# Patient Record
Sex: FEMALE | Race: WHITE | NOT HISPANIC OR LATINO | Employment: OTHER | URBAN - METROPOLITAN AREA
[De-identification: names, ages, dates, MRNs, and addresses within clinical notes are randomized per-mention and may not be internally consistent; named-entity substitution may affect disease eponyms.]

---

## 2017-05-08 ENCOUNTER — THERAPY VISIT (OUTPATIENT)
Dept: OCCUPATIONAL THERAPY | Facility: CLINIC | Age: 77
End: 2017-05-08
Payer: MEDICARE

## 2017-05-08 DIAGNOSIS — M79.645 PAIN OF LEFT THUMB: Primary | ICD-10-CM

## 2017-05-08 DIAGNOSIS — Z47.89 AFTERCARE FOLLOWING SURGERY OF THE MUSCULOSKELETAL SYSTEM: ICD-10-CM

## 2017-05-08 PROCEDURE — G8985 CARRY GOAL STATUS: HCPCS | Mod: GO | Performed by: OCCUPATIONAL THERAPIST

## 2017-05-08 PROCEDURE — 97165 OT EVAL LOW COMPLEX 30 MIN: CPT | Mod: GO | Performed by: OCCUPATIONAL THERAPIST

## 2017-05-08 PROCEDURE — 97110 THERAPEUTIC EXERCISES: CPT | Mod: GO | Performed by: OCCUPATIONAL THERAPIST

## 2017-05-08 PROCEDURE — G8984 CARRY CURRENT STATUS: HCPCS | Mod: GO | Performed by: OCCUPATIONAL THERAPIST

## 2017-05-08 NOTE — PROGRESS NOTES
Hand Therapy Initial Evaluation  Current Date:  5/8/2017    Subjective:  Meredith Allen is a 76 year old Right hand dominant female.    Diagnosis:L CMC Arthritis  DOI:  5/4/17 (MD Order)  DOS:  3/2/17  Procedure: L CMC Arthroplasty     Patient reports symptoms of pain, stiffness/loss of motion, weakness/loss of strength and edema of the L thumb which occurred due to surgery. Since onset symptoms are Gradually getting better. Special tests:  x-ray.  Previous treatment: none.  Patient has custom forearm based thumb spica but states that it is too small for her due to her swelling. General health as reported by patient is good.  Pertinent medical history includes: none.  Medical allergies: none.  Surgical history: orthopedic: 3 feet and 1 hand surgeries.  Medication history: none.  Occupation: retired,   Barriers include:none  Prior functional level:  no limitations  Red flags:  none    Additional Occupational Profile Information (patterns of daily living, interests, values and needs): Patient is retired and enjoys playing the organ but is currently frustrated with being unable to play.  Patient also quilts.        Upper Extremity Functional Index Score:  SCORE:   Column Totals: /80: 49   (A lower score indicates greater disability.)    Objective:  Pain Level Report: On scale 0-10/10  Date 5/8/2017    side L    Overall 3-4    At Rest 3-4    With Activity 7-8      Primary Report: location and description  Date 5/8/2017    Side L    Location Base of thumb and wrist    Radiation none    Pain Quality Dull and shooting     Frequency Worse at night and during the day    Duration Constant    Exacerbated by General use    Relieved by Ice    Progression since onset Gradually getting better       Edema: Mild edema in L wrist    Scar: Thumb scar: skin color, mild adherence, no sensitivity   Forearm scar: skin color, mild adherence, no sensitivity    Sensation: Normal sensation within thumb and wrist per pt  report    Thumb Range of Motion AROM (PROM):  Date 5/8/2017 5/8/2017   Side: R L   MCP ext 0 0   MCP flex 53 15   IP ext + +   IP flex 35 20   RABD 60 60   PABD 60 50     Range of Motion Wrist AROM (PROM):  Date 5/8/2017 5/8/2017   Side R L   Ext 70 60   Flex 65 35   UD 20 20   RD 10 10   Sup 90 90   Pro 70 75     STRENGTH: (Measured in pounds, pain scale 0-10/10)    Date 5/8/2017        Trials Left Right Left Right Left Right Left Right Left Right Left Right   1 15 40             2               3               Avg               Pain 2-3                Assessment:  Patient presents with symptoms consistent with diagnosis of right CMC thumb arthritis, with surgical intervention.    Patient s limitations or Problem List includes: Pain, Decreased ROM/motion, weakness, decreased  and pinch strength of the thumb which interferes with patients ability to perform  Self Care Tasks (dressing, eating), Sleep Patterns, Recreational Activities, Household Chores and Driving   as compared to previous level of function.    Rehab Potential: Good- Return to full activity, some limitations.    Patient will benefit from skilled Occupational Therapy to increase ROM, flexibility, pinch strength, and stability of the thumb and decrease pain to return to previous activity level and resume normal daily tasks and to reach their rehab potential.    Barriers to Learning:  No barrier    Communication Issues: Patient appears to be able to clearly communicate and understand verbal and written communication and follow directions correctly.    Assessment of Occupational Performance:  5 or more Performance Deficits  Identified Performance Deficits: dressing, functional mobility, driving and community mobility, health management and maintenance, home establishment and management, meal preparation and cleanup, sleep and leisure activities      Clinical Decision Making (Complexity): Low complexity    Treatment Explanations:  The following has  been discussed with the patient,  Rx ordered/plan of care  Anticipated outcomes  Possible risks and side effects    Treatment Plan:  Frequency:  1 X week, once daily  Duration:  for 6 weeks    Clinic:  Therapeutic Exercise: AROM/PROM of the wrist fingers, and thumb, thumb stabilization program, progressing to isometrics and isotonics  Manual Techniques: scar mobilization, MFR, nerve gliding  Orthosis:  Forearm based Thumb Orthosis  Education: Anatomy of CMC, joint protection principles, adaptive equipment as needed.    Home Program:  Exercise: AROM of thumb and wrist  Orthosis: Forearm based Thumb Spica PRN  Manual Techniques: scar mobilization    Discharge Plan:  Achieve all LTG  [place] in home treatment program.  Reach maximal therapeutic benefit.    Next Visit:  MFR  Distal Radial nerve glide  PROM  Scar massage

## 2017-05-08 NOTE — MR AVS SNAPSHOT
"              After Visit Summary   5/8/2017    Meredith Allen    MRN: [id number]           Patient Information     Date Of Birth          1940        Visit Information        Provider Department      5/8/2017 10:00 AM Shelley Smith MARITA        Today's Diagnoses     Pain of left thumb    -  1    Aftercare following surgery of the musculoskeletal system           Follow-ups after your visit        Your next 10 appointments already scheduled     May 17, 2017  4:30 PM CDT   [facility] with Shelley Smith   [facility] ([facility]  )    [address]  [address]  [facility] [address]   [phone]            May 24, 2017  1:30 PM CDT   [facility] with Shelley Smith   [facility] ([facility]  )    [address]  [facility] [address]   [phone]              Who to contact     If you have questions or need follow up information about today's clinic visit or your schedule please contact [facility] directly at [phone].  Normal or non-critical lab and imaging results will be communicated to you by [email or web address], letter or phone within 4 business days after the clinic has received the results. If you do not hear from us within 7 days, please contact the clinic through LockerDomet or phone. If you have a critical or abnormal lab result, we will notify you by phone as soon as possible.  Submit refill requests through Huoshi or call your pharmacy and they will forward the refill request to us. Please allow 3 business days for your refill to be completed.          Additional Information About Your Visit        [email or web address] Information     Huoshi lets you send messages to your doctor, view your test results, renew your prescriptions, schedule appointments and more. To sign up, go to [email or web address] . Click on \"Log in\" on the left side of the screen, which will take you to the Welcome page. Then click on \"Sign up Now\" on the right side of the " page.     You will be asked to enter the access code listed below, as well as some personal information. Please follow the directions to create your username and password.     Your access code is: [email or web address]  Expires: 2017  3:00 PM     Your access code will  in 90 days. If you need help or a new code, please call your [facility] clinic or [phone].        Care EveryWhere ID     This is your Care EveryWhere ID. This could be used by other organizations to access your [facility] medical records  [id number]         Blood Pressure from Last 3 Encounters:   12 140/65   02/15/12 99/59    Weight from Last 3 Encounters:   12 81 kg (178 lb 9.2 oz)   02/15/12 81.6 kg (180 lb)              We Performed the Following     HC OT EVAL, LOW COMPLEXITY     [facility] CERT REPORT     [facility] INITIAL EVAL REPORT        Primary Care Provider Office Phone # Fax #    Donn Alonso [phone]       [address] [address]        Thank you!     Thank you for choosing [facility]  for your care. Our goal is always to provide you with excellent care. Hearing back from our patients is one way we can continue to improve our services. Please take a few minutes to complete the written survey that you may receive in the mail after your visit with us. Thank you!             Your Updated Medication List - Protect others around you: Learn how to safely use, store and throw away your medicines at www.disposemymeds.org.          This list is accurate as of: 17  3:00 PM.  Always use your most recent med list.                   Brand Name Dispense Instructions for use    aspirin 81 MG EC tablet     300 tablet    Take 1 tablet by mouth daily.       calcium-magnesium 500-250 MG Tabs per tablet    CALMAG     Take 1 tablet by mouth daily.       cholecalciferol 1000 UNIT tablet    vitamin D     Take 1,000 Units by mouth daily.       co-enzyme Q-10 100 MG Caps  capsule      Take 100 mg by mouth daily.       conjugated estrogens cream    PREMARIN     Place  vaginally twice a week. Takes as needed       FENOFIBRATE PO      Take 145 mg by mouth daily.       fish oil-omega-3 fatty acids 1000 MG capsule      Take 2 g by mouth daily.       glucosamine-chondroitin 500-400 MG Caps per capsule      Take 1 capsule by mouth daily.       METOPROLOL TARTRATE PO      Take 25 mg by mouth 2 times daily.       Multi-vitamin Tabs tablet   Generic drug:  multivitamin, therapeutic with minerals      Take 1 tablet by mouth daily.       rosuvastatin 20 MG tablet    CRESTOR    90 tablet    Take 1 tablet by mouth daily.

## 2017-05-08 NOTE — LETTER
DEPARTMENT OF HEALTH AND HUMAN SERVICES  CENTERS FOR MEDICARE & MEDICAID SERVICES    PLAN/UPDATED PLAN OF PROGRESS FOR OUTPATIENT REHABILITATION    PATIENTS NAME:  Meredith Allen   : 1940  PROVIDER NUMBER:  [id number]  [id number]   PROVIDER NAME: [facility]  MEDICAL RECORD NUMBER: [id number]   START OF CARE DATE:    SOC Date: 17   TYPE:  OT  PRIMARY/TREATMENT DIAGNOSIS: (Pertinent Medical Diagnosis)  Pain of left thumb  Aftercare following surgery of the musculoskeletal system    VISITS FROM START OF CARE:  Rxs Used: 1     Hand Therapy Initial Evaluation  Current Date:  2017    Subjective:  Meredith Allen is a 76 year old Right hand dominant female.    Diagnosis:L CMC Arthritis  DOI:  17 (MD Order)  DOS:  3/2/17  Procedure: L CMC Arthroplasty     Patient reports symptoms of pain, stiffness/loss of motion, weakness/loss of strength and edema of the L thumb which occurred due to surgery. Since onset symptoms are Gradually getting better. Special tests:  x-ray.  Previous treatment: none.  Patient has custom forearm based thumb spica but states that it is too small for her due to her swelling. General health as reported by patient is good.  Pertinent medical history includes: none.  Medical allergies: none.  Surgical history: orthopedic: 3 feet and 1 hand surgeries.  Medication history: none.  Occupation: retired,   Barriers include:none  Prior functional level:  no limitations  Red flags:  none  Additional Occupational Profile Information (patterns of daily living, interests, values and needs): Patient is retired and enjoys playing the organ but is currently frustrated with being unable to play.  Patient also quilts.      Upper Extremity Functional Index Score:  SCORE:   Column Totals: /80: 49   (A lower score indicates greater disability.)    Objective:  Pain Level Report: On scale 0-10/10  Date 2017    side L    Overall 3-4    At Rest 3-4    With Activity 7-8     PATIENTS NAME:  Meredith Allen   : 1940    Primary Report: location and description  Date 2017    Side L    Location Base of thumb and wrist    Radiation none    Pain Quality Dull and shooting     Frequency Worse at night and during the day    Duration Constant    Exacerbated by General use    Relieved by Ice    Progression since onset Gradually getting better       Edema: Mild edema in L wrist    Scar: Thumb scar: skin color, mild adherence, no sensitivity   Forearm scar: skin color, mild adherence, no sensitivity    Sensation: Normal sensation within thumb and wrist per pt report    Thumb Range of Motion AROM (PROM):  Date 2017   Side: R L   MCP ext 0 0   MCP flex 53 15   IP ext + +   IP flex 35 20   RABD 60 60   PABD 60 50     Range of Motion Wrist AROM (PROM):  Date 2017   Side R L   Ext 70 60   Flex 65 35   UD 20 20   RD 10 10   Sup 90 90   Pro 70 75                     PATIENTS NAME:  Meredith Allen   : 1940    STRENGTH: (Measured in pounds, pain scale 0-10/10)    Date 2017      Trials Left Right Left Right Left Right Left Right   1 15 40         2           3           Avg           Pain 2-3            Assessment:  Patient presents with symptoms consistent with diagnosis of right CMC thumb arthritis, with surgical intervention.    Patient s limitations or Problem List includes: Pain, Decreased ROM/motion, weakness, decreased  and pinch strength of the thumb which interferes with patients ability to perform  Self Care Tasks (dressing, eating), Sleep Patterns, Recreational Activities, Household Chores and Driving   as compared to previous level of function.    Rehab Potential: Good- Return to full activity, some limitations.    Patient will benefit from skilled Occupational Therapy to increase ROM, flexibility, pinch strength, and stability of the thumb and decrease pain to return to previous activity level and resume normal daily tasks and to reach  their rehab potential.    Barriers to Learning:  No barrier    Communication Issues: Patient appears to be able to clearly communicate and understand verbal and written communication and follow directions correctly.    Assessment of Occupational Performance:  5 or more Performance Deficits  Identified Performance Deficits: dressing, functional mobility, driving and community mobility, health management and maintenance, home establishment and management, meal preparation and cleanup, sleep and leisure activities      Clinical Decision Making (Complexity): Low complexity    Treatment Explanations:  The following has been discussed with the patient,  Rx ordered/plan of care  Anticipated outcomes  Possible risks and side effects    Treatment Plan:  Frequency:  1 X week, once daily  Duration:  for 6 weeks    Clinic:  Therapeutic Exercise: AROM/PROM of the wrist fingers, and thumb, thumb stabilization program, progressing to isometrics and isotonics  Manual Techniques: scar mobilization, MFR, nerve gliding  Orthosis:  Forearm based Thumb Orthosis  Education: Anatomy of CMC, joint protection principles, adaptive equipment as needed.  PATIENTS NAME:  Merdeith Allen   : 1940    Home Program:  Exercise: AROM of thumb and wrist  Orthosis: Forearm based Thumb Spica PRN  Manual Techniques: scar mobilization    Discharge Plan:  Achieve all LTG  [place] in home treatment program.  Reach maximal therapeutic benefit.    Next Visit:  MFR  Distal Radial nerve glide  PROM  Scar massage                Caregiver Signature/Credentials ______________________________ Date ________      Treating Provider:  Shelley Smith OTR, CHT    I have reviewed and certified the need for these services and plan of treatment while under my care.     PHYSICIAN'S SIGNATURE:   ___________________________________ Date___________      Rachel Chirinos    Certification period: Beginning of Cert date period: 17 End of Cert period date: 17  "    Functional Level Progress Report: Please see attached \"Goal Flow sheet for Functional level.\"    ___X_____ Continue Services or       ________ DC Services                Service dates: SOC Date: 05/08/17  to present                                                                     "

## 2017-05-17 ENCOUNTER — THERAPY VISIT (OUTPATIENT)
Dept: OCCUPATIONAL THERAPY | Facility: CLINIC | Age: 77
End: 2017-05-17
Payer: MEDICARE

## 2017-05-17 DIAGNOSIS — M79.645 PAIN OF LEFT THUMB: ICD-10-CM

## 2017-05-17 DIAGNOSIS — Z47.89 AFTERCARE FOLLOWING SURGERY OF THE MUSCULOSKELETAL SYSTEM: ICD-10-CM

## 2017-05-17 PROCEDURE — 97140 MANUAL THERAPY 1/> REGIONS: CPT | Mod: GO | Performed by: OCCUPATIONAL THERAPIST

## 2017-05-17 PROCEDURE — 97110 THERAPEUTIC EXERCISES: CPT | Mod: GO | Performed by: OCCUPATIONAL THERAPIST

## 2017-05-24 ENCOUNTER — THERAPY VISIT (OUTPATIENT)
Dept: OCCUPATIONAL THERAPY | Facility: CLINIC | Age: 77
End: 2017-05-24
Payer: MEDICARE

## 2017-05-24 DIAGNOSIS — Z47.89 AFTERCARE FOLLOWING SURGERY OF THE MUSCULOSKELETAL SYSTEM: ICD-10-CM

## 2017-05-24 DIAGNOSIS — M79.645 PAIN OF LEFT THUMB: ICD-10-CM

## 2017-05-24 PROCEDURE — 97110 THERAPEUTIC EXERCISES: CPT | Mod: GO | Performed by: OCCUPATIONAL THERAPIST

## 2017-05-24 PROCEDURE — 97140 MANUAL THERAPY 1/> REGIONS: CPT | Mod: GO | Performed by: OCCUPATIONAL THERAPIST

## 2017-05-31 ENCOUNTER — THERAPY VISIT (OUTPATIENT)
Dept: OCCUPATIONAL THERAPY | Facility: CLINIC | Age: 77
End: 2017-05-31
Payer: MEDICARE

## 2017-05-31 DIAGNOSIS — M79.645 PAIN OF LEFT THUMB: ICD-10-CM

## 2017-05-31 DIAGNOSIS — Z47.89 AFTERCARE FOLLOWING SURGERY OF THE MUSCULOSKELETAL SYSTEM: ICD-10-CM

## 2017-05-31 PROCEDURE — 97112 NEUROMUSCULAR REEDUCATION: CPT | Mod: GO | Performed by: OCCUPATIONAL THERAPIST

## 2017-05-31 PROCEDURE — 97140 MANUAL THERAPY 1/> REGIONS: CPT | Mod: GO | Performed by: OCCUPATIONAL THERAPIST

## 2017-05-31 NOTE — PROGRESS NOTES
SOAP Note objective information for 5/31/2017    Thumb Range of Motion AROM (PROM):  Date 5/8/2017 5/8/2017 5/31/17   Side: R L L   MCP ext 0 0 NT   MCP flex 53 15 20   IP ext + + NT   IP flex 35 20 45   RABD 60 60 NT   PABD 60 50 58     Range of Motion Wrist AROM (PROM):  Date 5/8/2017 5/8/2017 5/31/17   Side R L L   Ext 70 60 68   Flex 65 35 50   UD 20 20 NT   RD 10 10 NT   Sup 90 90 NT   Pro 70 75 NT   Please refer to the daily flowsheet for treatment today, total treatment time and time spent performing 1:1 timed codes.

## 2017-06-07 ENCOUNTER — THERAPY VISIT (OUTPATIENT)
Dept: OCCUPATIONAL THERAPY | Facility: CLINIC | Age: 77
End: 2017-06-07
Payer: MEDICARE

## 2017-06-07 DIAGNOSIS — Z47.89 AFTERCARE FOLLOWING SURGERY OF THE MUSCULOSKELETAL SYSTEM: ICD-10-CM

## 2017-06-07 DIAGNOSIS — M79.645 PAIN OF LEFT THUMB: ICD-10-CM

## 2017-06-07 PROCEDURE — 97110 THERAPEUTIC EXERCISES: CPT | Mod: GO | Performed by: OCCUPATIONAL THERAPIST

## 2017-06-07 PROCEDURE — 97140 MANUAL THERAPY 1/> REGIONS: CPT | Mod: GO | Performed by: OCCUPATIONAL THERAPIST

## 2017-06-14 ENCOUNTER — THERAPY VISIT (OUTPATIENT)
Dept: OCCUPATIONAL THERAPY | Facility: CLINIC | Age: 77
End: 2017-06-14
Payer: MEDICARE

## 2017-06-14 DIAGNOSIS — Z47.89 AFTERCARE FOLLOWING SURGERY OF THE MUSCULOSKELETAL SYSTEM: ICD-10-CM

## 2017-06-14 DIAGNOSIS — M79.645 PAIN OF LEFT THUMB: ICD-10-CM

## 2017-06-14 PROCEDURE — 97112 NEUROMUSCULAR REEDUCATION: CPT | Mod: GO | Performed by: OCCUPATIONAL THERAPIST

## 2017-06-14 PROCEDURE — 97140 MANUAL THERAPY 1/> REGIONS: CPT | Mod: GO | Performed by: OCCUPATIONAL THERAPIST

## 2017-06-21 ENCOUNTER — THERAPY VISIT (OUTPATIENT)
Dept: OCCUPATIONAL THERAPY | Facility: CLINIC | Age: 77
End: 2017-06-21
Payer: MEDICARE

## 2017-06-21 DIAGNOSIS — Z47.89 AFTERCARE FOLLOWING SURGERY OF THE MUSCULOSKELETAL SYSTEM: ICD-10-CM

## 2017-06-21 DIAGNOSIS — M79.645 PAIN OF LEFT THUMB: ICD-10-CM

## 2017-06-21 PROCEDURE — 97110 THERAPEUTIC EXERCISES: CPT | Mod: GO | Performed by: OCCUPATIONAL THERAPIST

## 2017-06-21 PROCEDURE — 97140 MANUAL THERAPY 1/> REGIONS: CPT | Mod: GO | Performed by: OCCUPATIONAL THERAPIST

## 2017-06-28 ENCOUNTER — THERAPY VISIT (OUTPATIENT)
Dept: OCCUPATIONAL THERAPY | Facility: CLINIC | Age: 77
End: 2017-06-28
Payer: MEDICARE

## 2017-06-28 DIAGNOSIS — Z47.89 AFTERCARE FOLLOWING SURGERY OF THE MUSCULOSKELETAL SYSTEM: ICD-10-CM

## 2017-06-28 DIAGNOSIS — M79.645 PAIN OF LEFT THUMB: ICD-10-CM

## 2017-06-28 PROCEDURE — 97110 THERAPEUTIC EXERCISES: CPT | Mod: GO | Performed by: OCCUPATIONAL THERAPIST

## 2017-06-28 PROCEDURE — 97140 MANUAL THERAPY 1/> REGIONS: CPT | Mod: GO | Performed by: OCCUPATIONAL THERAPIST

## 2017-06-28 NOTE — PROGRESS NOTES
SOAP Note objective information for 6/28/2017    STRENGTH: (Measured in pounds, pain scale 0-10/10)    Date 5/8/2017 6/28/17       Trials Left Right Left Right Left Right Left Right Left Right Left Right   1 15 40 25 47           2               3               Avg               Pain 2-3                3 Point Pinch  Date 6/28/2017        Trials Left Right Left Right Left Right Left Right Left Right Left Right   1 8 10             2               3               Avg               Pain                 Lateral Pinch  Date 6/28/2017        Trials Left Right Left Right Left Right Left Right Left Right Left Right   1 8 14             2               3               Avg               Pain                 Please refer to the daily flowsheet for treatment today, total treatment time and time spent performing 1:1 timed codes.

## 2017-07-05 ENCOUNTER — THERAPY VISIT (OUTPATIENT)
Dept: OCCUPATIONAL THERAPY | Facility: CLINIC | Age: 77
End: 2017-07-05
Payer: MEDICARE

## 2017-07-05 DIAGNOSIS — Z47.89 AFTERCARE FOLLOWING SURGERY OF THE MUSCULOSKELETAL SYSTEM: ICD-10-CM

## 2017-07-05 DIAGNOSIS — M79.645 PAIN OF LEFT THUMB: ICD-10-CM

## 2017-07-05 PROCEDURE — 97140 MANUAL THERAPY 1/> REGIONS: CPT | Mod: GO | Performed by: OCCUPATIONAL THERAPIST

## 2017-07-05 PROCEDURE — G8985 CARRY GOAL STATUS: HCPCS | Mod: GO | Performed by: OCCUPATIONAL THERAPIST

## 2017-07-05 PROCEDURE — 97110 THERAPEUTIC EXERCISES: CPT | Mod: GO | Performed by: OCCUPATIONAL THERAPIST

## 2017-07-05 PROCEDURE — G8986 CARRY D/C STATUS: HCPCS | Mod: GO | Performed by: OCCUPATIONAL THERAPIST

## 2017-07-05 NOTE — LETTER
[facility]  [address]  [facility] [address]  [phone]    2017    Re: Meredith Allen   :   1940  MRN:  [id number]   REFERRING PHYSICIAN:   Rachel WOO  [facility]    Date of Initial Evaluation:  17  Visits:  Rxs Used: 9  Reason for Referral:     Pain of left thumb  Aftercare following surgery of the musculoskeletal system    Hand Therapy Progress/Discharge Note  Current Date:  2017  Reporting Period:  2017 to 2017    Subjective:  Meredith Allen is a 76 year old Right hand dominant female.    Diagnosis:L CMC Arthritis  DOI:  17 (MD Order)  DOS:  3/2/17  Procedure: L CMC Arthroplasty     S:  Subjective changes as noted by patient: Much better than originally.  I still have some sticking pains once in a while.       Functional changes noted by patient: I do everything with my left hand.  I think I am favoring my right hand because it hurts so much.     Response to previous treatment:  good  Patient has noted adverse reaction to:   None      Objective:  Pain Level Report: On scale 0-10/10  Date 2017   side L L   Overall 3-4 1-2   At Rest 3-4 0   With Activity 7-8 6-7                 Re: Meredith Allen   :   1940    Primary Report: location and description  Date 2017   Side L L   Location Base of thumb and wrist Lateral thumb MC   Radiation none None   Pain Quality Dull and shooting  Dull and shooting   Frequency Worse at night and during the day Intermittent   Duration Constant Occurs randomly throughout the day   Exacerbated by General use Use   Relieved by Ice Rest   Progression since onset Gradually getting better Gradually improving      Edema: Mild edema in L wrist    Scar: Thumb scar: skin color, mild adherence, no sensitivity   Forearm scar: skin color, mild adherence, no sensitivity    Sensation: Normal sensation within thumb and wrist per pt report    Thumb Range of Motion AROM (PROM):  Date  2017   Side: R L L   MCP ext 0 0 0   MCP flex 53 15 25   IP ext + + +   IP flex 35 20 52   RABD 60 60 NT   PABD 60 50 40     Range of Motion Wrist AROM (PROM):  Date 2017   Side R L L   Ext 70 60 72 WNL   Flex 65 35 55   UD 20 20 WNL   RD 10 10 WNL   Sup 90 90 WNL   Pro 70 75 WNL               Re: Meredith Allen   :   1940          STRENGTH: (Measured in pounds, pain scale 0-10/10)    Date 2017    Trials Left Right Left Right Left Right Left Right   1 15 40 25 47 39 32     2           3           Avg           Pain 2-3            3 Point Pinch  Date 2017     Trials Left Right Left Right Left Right Left Right   1 8 10 10 11       2           3           Avg           Pain             Lateral Pinch  Date 2017     Trials Left Right Left Right Left Right Left Right   1 8 14 12 15       2           3           Avg           Pain             A: Patient's symptoms are resolving.  Patient is progressing well.  Patient is independent in home exercise program.  Patient has met Short and Long Term Treatment Goals.  Patient is ready to be discharged from therapy and continue their home treatment program.    P:  Discharge from Hand Therapy; continue home program.  Patient to contact MD for tx orders should further issues arise.                    Re: Meredith Allen   :   1940      Home Program:  Exercise: A/PROM of thumb and wrist, isotonics  Orthosis: Forearm based Thumb Spica PRN  Manual Techniques: scar mobilization    Discharge Plan:  Achieve all LTG  [place] in home treatment program.  Reach maximal therapeutic benefit.    Thank you for your referral.    INQUIRIES  Therapist:  CHERYL Ward,[facility]  [address] [place] [address]  Phone: [phone]  Fax: [phone]

## 2017-07-05 NOTE — PROGRESS NOTES
Hand Therapy Progress/Discharge Note  Current Date:  7/5/2017  Reporting Period:  5/8/2017 to 7/5/2017    Subjective:  Meredith Allen is a 76 year old Right hand dominant female.    Diagnosis:L CMC Arthritis  DOI:  5/4/17 (MD Order)  DOS:  3/2/17  Procedure: L CMC Arthroplasty     S:  Subjective changes as noted by patient: Much better than originally.  I still have some sticking pains once in a while.       Functional changes noted by patient: I do everything with my left hand.  I think I am favoring my right hand because it hurts so much.     Response to previous treatment:  good  Patient has noted adverse reaction to:   None      Objective:  Pain Level Report: On scale 0-10/10  Date 5/8/2017 7/5/17   side L L   Overall 3-4 1-2   At Rest 3-4 0   With Activity 7-8 6-7     Primary Report: location and description  Date 5/8/2017 7/5/17   Side L L   Location Base of thumb and wrist Lateral thumb MC   Radiation none None   Pain Quality Dull and shooting  Dull and shooting   Frequency Worse at night and during the day Intermittent   Duration Constant Occurs randomly throughout the day   Exacerbated by General use Use   Relieved by Ice Rest   Progression since onset Gradually getting better Gradually improving      Edema: Mild edema in L wrist    Scar: Thumb scar: skin color, mild adherence, no sensitivity   Forearm scar: skin color, mild adherence, no sensitivity    Sensation: Normal sensation within thumb and wrist per pt report    Thumb Range of Motion AROM (PROM):  Date 5/8/2017 5/8/2017 7/5/17   Side: R L L   MCP ext 0 0 0   MCP flex 53 15 25   IP ext + + +   IP flex 35 20 52   RABD 60 60 NT   PABD 60 50 40     Range of Motion Wrist AROM (PROM):  Date 5/8/2017 5/8/2017 7/5/17   Side R L L   Ext 70 60 72 WNL   Flex 65 35 55   UD 20 20 WNL   RD 10 10 WNL   Sup 90 90 WNL   Pro 70 75 WNL     STRENGTH: (Measured in pounds, pain scale 0-10/10)    Date 5/8/2017 6/28/17 7/5/17      Trials Left Right Left Right Left  Right Left Right Left Right Left Right   1 15 40 25 47 39 32         2               3               Avg               Pain 2-3                3 Point Pinch  Date 6/28/2017 7/5/17       Trials Left Right Left Right Left Right Left Right Left Right Left Right   1 8 10 10 11           2               3               Avg               Pain                 Lateral Pinch  Date 6/28/2017 7/5/17       Trials Left Right Left Right Left Right Left Right Left Right Left Right   1 8 14 12 15           2               3               Avg               Pain                 A: Patient's symptoms are resolving.  Patient is progressing well.  Patient is independent in home exercise program.  Patient has met Short and Long Term Treatment Goals.  Patient is ready to be discharged from therapy and continue their home treatment program.    P:  Discharge from Hand Therapy; continue home program.  Patient to contact MD for tx orders should further issues arise.    Home Program:  Exercise: A/PROM of thumb and wrist, isotonics  Orthosis: Forearm based Thumb Spica PRN  Manual Techniques: scar mobilization    Discharge Plan:  Achieve all LTG  [place] in home treatment program.  Reach maximal therapeutic benefit.

## 2017-08-02 PROBLEM — Z47.89 AFTERCARE FOLLOWING SURGERY OF THE MUSCULOSKELETAL SYSTEM: Status: RESOLVED | Noted: 2017-05-08 | Resolved: 2017-08-02

## 2017-08-02 PROBLEM — M79.645 PAIN OF LEFT THUMB: Status: RESOLVED | Noted: 2017-05-08 | Resolved: 2017-08-02

## 2021-10-27 ENCOUNTER — APPOINTMENT (OUTPATIENT)
Dept: CT IMAGING | Facility: CLINIC | Age: 81
End: 2021-10-27
Attending: EMERGENCY MEDICINE
Payer: COMMERCIAL

## 2021-10-27 ENCOUNTER — HOSPITAL ENCOUNTER (EMERGENCY)
Facility: CLINIC | Age: 81
Discharge: HOME | End: 2021-10-27
Attending: EMERGENCY MEDICINE | Admitting: EMERGENCY MEDICINE
Payer: COMMERCIAL

## 2021-10-27 VITALS
RESPIRATION RATE: 18 BRPM | SYSTOLIC BLOOD PRESSURE: 137 MMHG | TEMPERATURE: 98.7 F | OXYGEN SATURATION: 98 % | HEART RATE: 74 BPM | DIASTOLIC BLOOD PRESSURE: 83 MMHG

## 2021-10-27 DIAGNOSIS — S16.1XXA STRAIN OF NECK MUSCLE, INITIAL ENCOUNTER: ICD-10-CM

## 2021-10-27 DIAGNOSIS — S09.90XA CLOSED HEAD INJURY, INITIAL ENCOUNTER: ICD-10-CM

## 2021-10-27 DIAGNOSIS — M54.50 MIDLINE LOW BACK PAIN WITHOUT SCIATICA, UNSPECIFIED CHRONICITY: ICD-10-CM

## 2021-10-27 PROCEDURE — 72125 CT NECK SPINE W/O DYE: CPT

## 2021-10-27 PROCEDURE — 70450 CT HEAD/BRAIN W/O DYE: CPT

## 2021-10-27 PROCEDURE — 99285 EMERGENCY DEPT VISIT HI MDM: CPT | Mod: 25

## 2021-10-27 PROCEDURE — 72131 CT LUMBAR SPINE W/O DYE: CPT

## 2021-10-27 PROCEDURE — 250N000013 HC RX MED GY IP 250 OP 250 PS 637: Performed by: EMERGENCY MEDICINE

## 2021-10-27 RX ORDER — ACETAMINOPHEN 500 MG
1000 TABLET ORAL ONCE
Status: COMPLETED | OUTPATIENT
Start: 2021-10-27 | End: 2021-10-27

## 2021-10-27 RX ADMIN — ACETAMINOPHEN 1000 MG: 500 TABLET, FILM COATED ORAL at 13:20

## 2021-10-27 ASSESSMENT — ENCOUNTER SYMPTOMS
VOMITING: 0
ARTHRALGIAS: 1
LIGHT-HEADEDNESS: 0
PALPITATIONS: 0
BACK PAIN: 1
MYALGIAS: 1
DIZZINESS: 0
NECK PAIN: 1
HEADACHES: 1

## 2021-10-27 NOTE — ED PROVIDER NOTES
History   Chief Complaint:  Fall     The history is provided by the patient.      Meredith Allen is a 81 year old female with history of hyperlipidemia and hyperglycemia who presents with fall. The patient tells us that prior to arrival she was walking down a narrow hallway at a [employer] restaurant when her foot got caught on the corner of a table and fell backwards onto her butt and back. She does note hitting the back of her head but denies losing consciousness or having palpitations prior to arrival. After the fall the patient received help getting off the floor but was able to walk to the ambulance by herself. She typically walks with a cane but did not have it today while at the chinese restaurant. While in the ED she notes back pain, neck pain and a headache. The patient denies lightheadedness, chest pain, vomiting or history of falls.    Review of Systems   Cardiovascular: Negative for chest pain and palpitations.   Gastrointestinal: Negative for vomiting.   Musculoskeletal: Positive for arthralgias, back pain, myalgias and neck pain.   Neurological: Positive for headaches. Negative for dizziness, syncope and light-headedness.   All other systems reviewed and are negative.    Allergies:  Sulfa Drugs  Statins-Hmg-Coa    Medications:  Aspirin 81 mg  Calcium-Magnesium   Cholecalciferol   Premarin   Fenofibrate  Glucosamine-Chondroitin   Metoprolol Tartrate  Crestor    Past Medical History:     Back pain   Hyperlipidemia  Migraines  Hyperglycemia      Past Surgical History:    Cholecystectomy  Colonoscopy   Cystectomy   Hysterectomy  Phacoemulsification    Bunionectomy  Salpingo-Opherectomy    Family History:    The patient denies past family history.     Social History:  The patient presents to the ED with her friend   The patients  passed away last year     Physical Exam     Patient Vitals for the past 24 hrs:   BP Temp Temp src Pulse Resp SpO2   10/27/21 1238 (!) 189/93 -- -- 71 18 100 %   10/27/21  1230 (!) 193/90 98.7  F (37.1  C) Oral 69 16 98 %     Physical Exam  VS: Reviewed per above  HENT: Mucous membranes moist, no nuchal rigidity. Mild midline c spine ttp.  Soft tissue swelling to right occipital region.  EYES: sclera anicteric  CV: Rate as noted, regular rhythm.   RESP: Effort normal. Breath sounds are normal bilaterally.  GI: no tenderness/rebound/guarding, not distended.  NEURO: GCS 15, cranial nerves II through XII are intact, 5 out of 5 strength in all 4 extremities, sensation is intact light touch in all 4 extremities  MSK: No deformity of the extremities  SKIN: Warm and dry    Emergency Department Course     Imaging:    Lumbar spine CT w/o contrast   Preliminary Result   IMPRESSION:     1. No acute fracture or subluxation in the lumbar spine.   2. Degenerative changes in the lumbar spine without significant spinal   canal or neural foraminal narrowing.         Cervical spine CT w/o contrast   Preliminary Result   IMPRESSION:    1. No evidence of acute fracture or subluxation in the cervical spine.   2. Degenerative changes in the cervical spine as described above.         Head CT w/o contrast   Preliminary Result   IMPRESSION:      1. No evidence of acute intracranial hemorrhage, mass, or herniation.   2. Mild diffuse parenchymal volume loss and white matter changes   likely due to chronic microvascular ischemic disease.           Report per radiology    Emergency Department Course:  Reviewed:  I reviewed nursing notes, vitals, past medical history, Care Everywhere and [facility]    Assessments:  1252 I obtained history and examined the patient as noted above.   1407 I rechecked the patient and removed her C collar at this time.     Interventions:  1320 Tylenol 1,000 mg PO    Disposition:  The patient was discharged to home.     Impression & Plan     Medical Decision Making:  Patient presents to the ER for evaluation of trip and fall landing on her back and head.  On arrival vital signs are  reassuring.  Exam she has soft tissue swelling of the superior occipital scalp as well as mild C-spine tenderness and L-spine tenderness.  CT imaging of the head and C/L spine did not show acute injuries.  C-collar was cleared.  Patient declined pain medicine aside from Tylenol.  Discussed signs and symptoms of concussion and reasons to return to the ER for evaluation.  Plan for close primary care follow up.  Return precautions were discussed prior to discharge.    Diagnosis:    ICD-10-CM    1. Closed head injury, initial encounter  S09.90XA    2. Strain of neck muscle, initial encounter  S16.1XXA    3. Midline low back pain without sciatica, unspecified chronicity  M54.50      Scribe Disclosure:  I, Alfie Nolasco, am serving as a scribe at 12:32 PM on 10/27/2021 to document services personally performed by Andres Poe MD, based on my observations and the provider's statements to me.          Andres Poe MD  10/27/21 1154

## 2021-10-27 NOTE — ED NOTES
Patient discharged home with friend. Vital signs stable at discharge. Education provided regarding avs reviewed. Pt verbalized understanding. All questions answered.

## 2021-10-27 NOTE — DISCHARGE INSTRUCTIONS
Discharge Instructions  Head Injury    You have been seen today for a head injury. Your evaluation included a history and physical examination. You may have had a CT (CAT) scan performed, though most head injuries do not require a scan. Based on this evaluation, your provider today does not feel that your head injury is serious.    Generally, every Emergency Department visit should have a follow-up clinic visit with either a primary or a specialty clinic/provider. Please follow-up as instructed by your emergency provider today.  Return to the Emergency Department if:  You are confused or you are not acting right.  Your headache gets worse or you start to have a really bad headache even with your recommended treatment plan.  You vomit (throw up) more than once.  You have a seizure.  You have trouble walking.  You have weakness or paralysis (cannot move) in an arm or a leg.  You have blood or fluid coming from your ears or nose.  You have new symptoms or anything that worries you.    Sleeping:  It is okay for you to sleep, but someone should wake you up if instructed by your provider, and someone should check on you at your usual time to wake up.     Activity:  Do not drive for at least 24 hours.  Do not drive if you have dizzy spells or trouble concentrating, or remembering things.  Do not return to any contact sports until cleared by your regular provider.     MORE INFORMATION:    Concussion:  A concussion is a minor head injury that may cause temporary problems with the way the brain works. Although concussions are important, they are generally not an emergency or a reason that a person needs to be hospitalized. Some concussion symptoms include confusion, amnesia (forgetful), nausea (sick to your stomach) and vomiting (throwing up), dizziness, fatigue, memory or concentration problems, irritability and sleep problems. For most people, concussions are mild and temporary but some will have more severe and persistent  symptoms that require on-going care and treatment.  CT Scans: Your evaluation today may have included a CT scan (CAT scan) to look for things like bleeding or a skull fracture (broken bone).  CT scans involve radiation and too many CT scans can cause serious health problems like cancer, especially in children.  Because of this, your provider may not have ordered a CT scan today if they think you are at low risk for a serious or life threatening problem.    If you were given a prescription for medicine here today, be sure to read all of the information (including the package insert) that comes with your prescription.  This will include important information about the medicine, its side effects, and any warnings that you need to know about.  The pharmacist who fills the prescription can provide more information and answer questions you may have about the medicine.  If you have questions or concerns that the pharmacist cannot address, please call or return to the Emergency Department.     Remember that you can always come back to the Emergency Department if you are not able to see your regular provider in the amount of time listed above, if you get any new symptoms, or if there is anything that worries you.    Discharge Instructions  Neck Strain    You have been seen today for a neck sprain or strain.  Neck strains usually result from an injury to the neck. Car accidents, contact sports, and falls are common causes of neck strain. Sometimes your neck can start to hurt because of increased activity, muscle tension, an abnormal sleeping position, or because of other problems like arthritis in the neck.     Neck pain usually comes from injured muscles and ligaments. Sometimes there is a herniated ( slipped ) disc. We do not usually do MRI scans to look for these right away, since most herniated discs will get better on their own with time. Today, we did not find any evidence that your neck pain was caused by a serious or  dangerous condition. However, sometimes symptoms develop over time and cannot be found during an emergency visit, so it is very important that you follow up with your primary provider.    Generally, every Emergency Department visit should have a follow-up clinic visit with either a primary or a specialty clinic/provider. Please follow-up as instructed by your emergency provider today.    Return to the Emergency Department if:  You have increasing pain in your neck.  You develop difficulty swallowing or breathing.  You have numbness, weakness, or trouble moving your arms or legs.  You have severe dizziness and difficulty walking.  You are unable to control your bladder or bowels.  You develop severe headache or ringing in the ears.    What can I do to help myself at home?  If you had an injury, use cold for the first 1-2 days. Cold helps relieve pain and reduce inflammation.  Apply ice packs to the neck or areas of pain every 1-2 hours for 20 minutes at a time. Place a towel or cloth between your skin and the ice pack.  After the first 2 days, using heat can help with neck pain and stiffness. You may use a warm shower or bath, warm towels on the neck, or a heating pad. Do not sleep with a heating pad, as you can be burned.   Pain medications - You may take a pain medication such as Tylenol  (acetaminophen), Advil  and Motrin  (ibuprofen), or Aleve  (naproxen).  It is usually best to rest the neck for 1-2 days after an injury, then start gentle stretching exercises.   It is helpful to place a small pillow under the nape of your neck to provide proper neutral positioning.   You should stay active and do your usual work as much as you can, unless this involves heavy physical labor. Ask your provider if you need work restrictions.  If you were given a prescription for medicine here today, be sure to read all of the information (including the package insert) that comes with your prescription.  This will include important  information about the medicine, its side effects, and any warnings that you need to know about.  The pharmacist who fills the prescription can provide more information and answer questions you may have about the medicine.  If you have questions or concerns that the pharmacist cannot address, please call or return to the Emergency Department.   Remember that you can always come back to the Emergency Department if you are not able to see your regular provider in the amount of time listed above, if you get any new symptoms, or if there is anything that worries you.    Discharge Instructions  Back Pain  You were seen today for back pain. Back pain can have many causes, but most will get better without surgery or other specific treatment. Sometimes there is a herniated ( slipped ) disc. We do not usually do MRI scans to look for these right away, since most herniated discs will get better on their own with time.  Today, we did not find any evidence that your back pain was caused by a serious condition. However, sometimes symptoms develop over time and cannot be found during an emergency visit, so it is very important that you follow up with your primary provider.  Generally, every Emergency Department visit should have a follow-up clinic visit with either a primary or a specialty clinic/provider. Please follow-up as instructed by your emergency provider today.    Return to the Emergency Department if:  You develop a fever with your back pain.   You have weakness or change in sensation in one or both legs.  You lose control of your bowels or bladder, or cannot empty your bladder (cannot pee).  Your pain gets much worse.     Follow-up with your provider:  Unless your pain has completely gone away, please make an appointment with your provider within one week. Most of the routine care for back pain is available in a clinic and not the Emergency Department. You may need further management of your back pain, such as more pain  medication, imaging such as an X-ray or MRI, or physical therapy.    What can I do to help myself?  Remain Active -- People are often afraid that they will hurt their back further or delay recovery by remaining active, but this is one of the best things you can do for your back. In fact, staying in bed for a long time to rest is not recommended. Studies have shown that people with low back pain recover faster when they remain active. Movement helps to bring blood flow to the muscles and relieve muscle spasms as well as preventing loss of muscle strength.  Heat -- Using a heating pad can help with low back pain during the first few weeks. Do not sleep with a heating pad, as you can be burned.   Pain medications - You may take a pain medication such as Tylenol  (acetaminophen), Advil , Motrin  (ibuprofen) or Aleve  (naproxen).  If you were given a prescription for medicine here today, be sure to read all of the information (including the package insert) that comes with your prescription.  This will include important information about the medicine, its side effects, and any warnings that you need to know about.  The pharmacist who fills the prescription can provide more information and answer questions you may have about the medicine.  If you have questions or concerns that the pharmacist cannot address, please call or return to the Emergency Department.   Remember that you can always come back to the Emergency Department if you are not able to see your regular provider in the amount of time listed above, if you get any new symptoms, or if there is anything that worries you.

## 2021-10-27 NOTE — ED TRIAGE NOTES
Pt brought in via EMS, reports pt was out with friends for lunch, was walking down a narrow walkway and tripped over a chair leg. EMS reports no LOC, denies neck or back pain but does have pain on back of head. Reports taking ASA 81 every day, and lives alone which is the reason for them bringing pt in per EMS. . A/OX4, ABC's intact. VSS, EMS reports elevated BP.

## 2023-12-30 ENCOUNTER — APPOINTMENT (OUTPATIENT)
Dept: CT IMAGING | Facility: CLINIC | Age: 83
End: 2023-12-30
Attending: EMERGENCY MEDICINE
Payer: COMMERCIAL

## 2023-12-30 ENCOUNTER — APPOINTMENT (OUTPATIENT)
Dept: CT IMAGING | Facility: CLINIC | Age: 83
End: 2023-12-30
Attending: HOSPITALIST
Payer: COMMERCIAL

## 2023-12-30 ENCOUNTER — HOSPITAL ENCOUNTER (OUTPATIENT)
Facility: CLINIC | Age: 83
Setting detail: OBSERVATION
Discharge: NURSING, REHAB OR LONG-TERM CARE | End: 2024-01-02
Attending: EMERGENCY MEDICINE | Admitting: EMERGENCY MEDICINE
Payer: COMMERCIAL

## 2023-12-30 DIAGNOSIS — S09.90XA HEAD INJURY, INITIAL ENCOUNTER: ICD-10-CM

## 2023-12-30 DIAGNOSIS — F51.01 PRIMARY INSOMNIA: Primary | ICD-10-CM

## 2023-12-30 DIAGNOSIS — R55 SYNCOPE, UNSPECIFIED SYNCOPE TYPE: ICD-10-CM

## 2023-12-30 DIAGNOSIS — S06.0XAA CONCUSSION WITH UNKNOWN LOSS OF CONSCIOUSNESS STATUS, INITIAL ENCOUNTER: ICD-10-CM

## 2023-12-30 LAB
ABO/RH(D): NORMAL
ALBUMIN SERPL BCG-MCNC: 4 G/DL (ref 3.5–5.2)
ALBUMIN UR-MCNC: NEGATIVE MG/DL
ALP SERPL-CCNC: 101 U/L (ref 40–150)
ALT SERPL W P-5'-P-CCNC: 14 U/L (ref 0–50)
ANION GAP SERPL CALCULATED.3IONS-SCNC: 13 MMOL/L (ref 7–15)
ANTIBODY SCREEN: NEGATIVE
APPEARANCE UR: CLEAR
AST SERPL W P-5'-P-CCNC: 23 U/L (ref 0–45)
ATRIAL RATE - MUSE: 102 BPM
BASOPHILS # BLD AUTO: 0.1 10E3/UL (ref 0–0.2)
BASOPHILS NFR BLD AUTO: 0 %
BILIRUB SERPL-MCNC: 0.5 MG/DL
BILIRUB UR QL STRIP: NEGATIVE
BUN SERPL-MCNC: 23.3 MG/DL (ref 8–23)
CALCIUM SERPL-MCNC: 9.5 MG/DL (ref 8.8–10.2)
CHLORIDE SERPL-SCNC: 98 MMOL/L (ref 98–107)
CK SERPL-CCNC: 59 U/L (ref 26–192)
COLOR UR AUTO: ABNORMAL
CREAT SERPL-MCNC: 0.77 MG/DL (ref 0.51–0.95)
DEPRECATED HCO3 PLAS-SCNC: 26 MMOL/L (ref 22–29)
DIASTOLIC BLOOD PRESSURE - MUSE: NORMAL MMHG
EGFRCR SERPLBLD CKD-EPI 2021: 76 ML/MIN/1.73M2
EOSINOPHIL # BLD AUTO: 0.1 10E3/UL (ref 0–0.7)
EOSINOPHIL NFR BLD AUTO: 1 %
ERYTHROCYTE [DISTWIDTH] IN BLOOD BY AUTOMATED COUNT: 12.7 % (ref 10–15)
GLUCOSE SERPL-MCNC: 252 MG/DL (ref 70–99)
GLUCOSE UR STRIP-MCNC: 300 MG/DL
HCO3 BLDV-SCNC: 29 MMOL/L (ref 21–28)
HCT VFR BLD AUTO: 46.6 % (ref 35–47)
HGB BLD-MCNC: 15.2 G/DL (ref 11.7–15.7)
HGB UR QL STRIP: NEGATIVE
HYALINE CASTS: 1 /LPF
IMM GRANULOCYTES # BLD: 0.1 10E3/UL
IMM GRANULOCYTES NFR BLD: 0 %
INTERPRETATION ECG - MUSE: NORMAL
KETONES UR STRIP-MCNC: 20 MG/DL
LACTATE BLD-SCNC: 0.9 MMOL/L
LEUKOCYTE ESTERASE UR QL STRIP: NEGATIVE
LIPASE SERPL-CCNC: 43 U/L (ref 13–60)
LYMPHOCYTES # BLD AUTO: 1.7 10E3/UL (ref 0.8–5.3)
LYMPHOCYTES NFR BLD AUTO: 11 %
MAGNESIUM SERPL-MCNC: 2 MG/DL (ref 1.7–2.3)
MCH RBC QN AUTO: 29.5 PG (ref 26.5–33)
MCHC RBC AUTO-ENTMCNC: 32.6 G/DL (ref 31.5–36.5)
MCV RBC AUTO: 91 FL (ref 78–100)
MONOCYTES # BLD AUTO: 0.8 10E3/UL (ref 0–1.3)
MONOCYTES NFR BLD AUTO: 5 %
NEUTROPHILS # BLD AUTO: 12.8 10E3/UL (ref 1.6–8.3)
NEUTROPHILS NFR BLD AUTO: 83 %
NITRATE UR QL: NEGATIVE
NRBC # BLD AUTO: 0 10E3/UL
NRBC BLD AUTO-RTO: 0 /100
P AXIS - MUSE: 58 DEGREES
PCO2 BLDV: 51 MM HG (ref 40–50)
PH BLDV: 7.37 [PH] (ref 7.32–7.43)
PH UR STRIP: 6 [PH] (ref 5–7)
PLATELET # BLD AUTO: 320 10E3/UL (ref 150–450)
PO2 BLDV: 29 MM HG (ref 25–47)
POTASSIUM SERPL-SCNC: 4.3 MMOL/L (ref 3.4–5.3)
PR INTERVAL - MUSE: 180 MS
PROT SERPL-MCNC: 7.3 G/DL (ref 6.4–8.3)
QRS DURATION - MUSE: 80 MS
QT - MUSE: 388 MS
QTC - MUSE: 505 MS
R AXIS - MUSE: 46 DEGREES
RBC # BLD AUTO: 5.15 10E6/UL (ref 3.8–5.2)
RBC URINE: <1 /HPF
SAO2 % BLDV: 52 % (ref 94–100)
SODIUM SERPL-SCNC: 137 MMOL/L (ref 135–145)
SP GR UR STRIP: 1.02 (ref 1–1.03)
SPECIMEN EXPIRATION DATE: NORMAL
SYSTOLIC BLOOD PRESSURE - MUSE: NORMAL MMHG
T AXIS - MUSE: 72 DEGREES
TROPONIN T SERPL HS-MCNC: 15 NG/L
UROBILINOGEN UR STRIP-MCNC: NORMAL MG/DL
VENTRICULAR RATE- MUSE: 102 BPM
WBC # BLD AUTO: 15.6 10E3/UL (ref 4–11)
WBC URINE: 1 /HPF

## 2023-12-30 PROCEDURE — 250N000009 HC RX 250: Performed by: EMERGENCY MEDICINE

## 2023-12-30 PROCEDURE — 83690 ASSAY OF LIPASE: CPT | Performed by: EMERGENCY MEDICINE

## 2023-12-30 PROCEDURE — 36415 COLL VENOUS BLD VENIPUNCTURE: CPT | Performed by: EMERGENCY MEDICINE

## 2023-12-30 PROCEDURE — 250N000011 HC RX IP 250 OP 636: Performed by: EMERGENCY MEDICINE

## 2023-12-30 PROCEDURE — 99223 1ST HOSP IP/OBS HIGH 75: CPT | Performed by: HOSPITALIST

## 2023-12-30 PROCEDURE — 93005 ELECTROCARDIOGRAM TRACING: CPT

## 2023-12-30 PROCEDURE — 82550 ASSAY OF CK (CPK): CPT | Performed by: HOSPITALIST

## 2023-12-30 PROCEDURE — 83036 HEMOGLOBIN GLYCOSYLATED A1C: CPT | Performed by: HOSPITALIST

## 2023-12-30 PROCEDURE — 82962 GLUCOSE BLOOD TEST: CPT

## 2023-12-30 PROCEDURE — 80053 COMPREHEN METABOLIC PANEL: CPT | Performed by: EMERGENCY MEDICINE

## 2023-12-30 PROCEDURE — 87040 BLOOD CULTURE FOR BACTERIA: CPT | Performed by: EMERGENCY MEDICINE

## 2023-12-30 PROCEDURE — 84484 ASSAY OF TROPONIN QUANT: CPT | Performed by: HOSPITALIST

## 2023-12-30 PROCEDURE — 72131 CT LUMBAR SPINE W/O DYE: CPT

## 2023-12-30 PROCEDURE — 70496 CT ANGIOGRAPHY HEAD: CPT

## 2023-12-30 PROCEDURE — 74177 CT ABD & PELVIS W/CONTRAST: CPT

## 2023-12-30 PROCEDURE — 96374 THER/PROPH/DIAG INJ IV PUSH: CPT

## 2023-12-30 PROCEDURE — 250N000012 HC RX MED GY IP 250 OP 636 PS 637: Performed by: HOSPITALIST

## 2023-12-30 PROCEDURE — 72125 CT NECK SPINE W/O DYE: CPT

## 2023-12-30 PROCEDURE — 36415 COLL VENOUS BLD VENIPUNCTURE: CPT | Performed by: HOSPITALIST

## 2023-12-30 PROCEDURE — 82565 ASSAY OF CREATININE: CPT | Mod: 91

## 2023-12-30 PROCEDURE — 99285 EMERGENCY DEPT VISIT HI MDM: CPT | Mod: 25

## 2023-12-30 PROCEDURE — 85025 COMPLETE CBC W/AUTO DIFF WBC: CPT | Performed by: EMERGENCY MEDICINE

## 2023-12-30 PROCEDURE — 84484 ASSAY OF TROPONIN QUANT: CPT | Performed by: EMERGENCY MEDICINE

## 2023-12-30 PROCEDURE — 81001 URINALYSIS AUTO W/SCOPE: CPT | Performed by: EMERGENCY MEDICINE

## 2023-12-30 PROCEDURE — 258N000003 HC RX IP 258 OP 636: Performed by: HOSPITALIST

## 2023-12-30 PROCEDURE — 86900 BLOOD TYPING SEROLOGIC ABO: CPT | Performed by: EMERGENCY MEDICINE

## 2023-12-30 PROCEDURE — 83735 ASSAY OF MAGNESIUM: CPT | Performed by: EMERGENCY MEDICINE

## 2023-12-30 PROCEDURE — 70450 CT HEAD/BRAIN W/O DYE: CPT

## 2023-12-30 PROCEDURE — G0378 HOSPITAL OBSERVATION PER HR: HCPCS

## 2023-12-30 PROCEDURE — 96375 TX/PRO/DX INJ NEW DRUG ADDON: CPT | Mod: 59

## 2023-12-30 PROCEDURE — 82803 BLOOD GASES ANY COMBINATION: CPT

## 2023-12-30 RX ORDER — HYDRALAZINE HYDROCHLORIDE 10 MG/1
10 TABLET, FILM COATED ORAL EVERY 4 HOURS PRN
Status: DISCONTINUED | OUTPATIENT
Start: 2023-12-30 | End: 2024-01-02 | Stop reason: HOSPADM

## 2023-12-30 RX ORDER — ASPIRIN 81 MG/1
81 TABLET ORAL DAILY
COMMUNITY

## 2023-12-30 RX ORDER — NICOTINE POLACRILEX 4 MG
15-30 LOZENGE BUCCAL
Status: DISCONTINUED | OUTPATIENT
Start: 2023-12-30 | End: 2024-01-02 | Stop reason: HOSPADM

## 2023-12-30 RX ORDER — AMOXICILLIN 250 MG
1 CAPSULE ORAL 2 TIMES DAILY PRN
Status: DISCONTINUED | OUTPATIENT
Start: 2023-12-30 | End: 2024-01-02 | Stop reason: HOSPADM

## 2023-12-30 RX ORDER — ONDANSETRON 2 MG/ML
4 INJECTION INTRAMUSCULAR; INTRAVENOUS EVERY 30 MIN PRN
Status: DISCONTINUED | OUTPATIENT
Start: 2023-12-30 | End: 2023-12-30

## 2023-12-30 RX ORDER — ONDANSETRON 4 MG/1
4 TABLET, ORALLY DISINTEGRATING ORAL EVERY 6 HOURS PRN
Status: DISCONTINUED | OUTPATIENT
Start: 2023-12-30 | End: 2024-01-02 | Stop reason: HOSPADM

## 2023-12-30 RX ORDER — METOPROLOL SUCCINATE 25 MG/1
25 TABLET, EXTENDED RELEASE ORAL DAILY
Status: DISCONTINUED | OUTPATIENT
Start: 2023-12-31 | End: 2024-01-02 | Stop reason: HOSPADM

## 2023-12-30 RX ORDER — ESCITALOPRAM OXALATE 10 MG/1
10 TABLET ORAL DAILY
COMMUNITY

## 2023-12-30 RX ORDER — AMOXICILLIN 250 MG
2 CAPSULE ORAL 2 TIMES DAILY PRN
Status: DISCONTINUED | OUTPATIENT
Start: 2023-12-30 | End: 2024-01-02 | Stop reason: HOSPADM

## 2023-12-30 RX ORDER — GLIMEPIRIDE 1 MG/1
0.5 TABLET ORAL
COMMUNITY

## 2023-12-30 RX ORDER — ONDANSETRON 2 MG/ML
4 INJECTION INTRAMUSCULAR; INTRAVENOUS ONCE
Status: COMPLETED | OUTPATIENT
Start: 2023-12-30 | End: 2023-12-30

## 2023-12-30 RX ORDER — IOPAMIDOL 755 MG/ML
90 INJECTION, SOLUTION INTRAVASCULAR ONCE
Status: COMPLETED | OUTPATIENT
Start: 2023-12-30 | End: 2023-12-30

## 2023-12-30 RX ORDER — DEXTROSE MONOHYDRATE 25 G/50ML
25-50 INJECTION, SOLUTION INTRAVENOUS
Status: DISCONTINUED | OUTPATIENT
Start: 2023-12-30 | End: 2024-01-02 | Stop reason: HOSPADM

## 2023-12-30 RX ORDER — ONDANSETRON 2 MG/ML
4 INJECTION INTRAMUSCULAR; INTRAVENOUS EVERY 6 HOURS PRN
Status: DISCONTINUED | OUTPATIENT
Start: 2023-12-30 | End: 2024-01-02 | Stop reason: HOSPADM

## 2023-12-30 RX ORDER — METOPROLOL SUCCINATE 25 MG/1
25 TABLET, EXTENDED RELEASE ORAL DAILY
Status: ON HOLD | COMMUNITY
Start: 2023-10-31 | End: 2024-07-18

## 2023-12-30 RX ORDER — SODIUM CHLORIDE 9 MG/ML
INJECTION, SOLUTION INTRAVENOUS CONTINUOUS
Status: DISCONTINUED | OUTPATIENT
Start: 2023-12-30 | End: 2024-01-02 | Stop reason: HOSPADM

## 2023-12-30 RX ORDER — HYDRALAZINE HYDROCHLORIDE 20 MG/ML
10 INJECTION INTRAMUSCULAR; INTRAVENOUS EVERY 4 HOURS PRN
Status: DISCONTINUED | OUTPATIENT
Start: 2023-12-30 | End: 2024-01-02 | Stop reason: HOSPADM

## 2023-12-30 RX ORDER — DIPHENHYDRAMINE HYDROCHLORIDE 50 MG/ML
25 INJECTION INTRAMUSCULAR; INTRAVENOUS ONCE
Status: COMPLETED | OUTPATIENT
Start: 2023-12-30 | End: 2023-12-30

## 2023-12-30 RX ORDER — ESCITALOPRAM OXALATE 10 MG/1
10 TABLET ORAL DAILY
Status: DISCONTINUED | OUTPATIENT
Start: 2023-12-31 | End: 2024-01-02 | Stop reason: HOSPADM

## 2023-12-30 RX ADMIN — SODIUM CHLORIDE 66 ML: 9 INJECTION, SOLUTION INTRAVENOUS at 17:38

## 2023-12-30 RX ADMIN — PROCHLORPERAZINE EDISYLATE 5 MG: 5 INJECTION INTRAMUSCULAR; INTRAVENOUS at 18:01

## 2023-12-30 RX ADMIN — DIPHENHYDRAMINE HYDROCHLORIDE 25 MG: 50 INJECTION, SOLUTION INTRAMUSCULAR; INTRAVENOUS at 18:01

## 2023-12-30 RX ADMIN — ONDANSETRON 4 MG: 2 INJECTION INTRAMUSCULAR; INTRAVENOUS at 17:28

## 2023-12-30 RX ADMIN — SODIUM CHLORIDE: 9 INJECTION, SOLUTION INTRAVENOUS at 22:56

## 2023-12-30 RX ADMIN — IOPAMIDOL 90 ML: 755 INJECTION, SOLUTION INTRAVENOUS at 17:38

## 2023-12-30 RX ADMIN — INSULIN ASPART 2 UNITS: 100 INJECTION, SOLUTION INTRAVENOUS; SUBCUTANEOUS at 23:29

## 2023-12-30 ASSESSMENT — ACTIVITIES OF DAILY LIVING (ADL)
ADLS_ACUITY_SCORE: 35

## 2023-12-30 NOTE — ED NOTES
Bed: ST02  Expected date:   Expected time:   Means of arrival:   Comments:  [id number] fall ETA 1720 stroke sx

## 2023-12-30 NOTE — ED TRIAGE NOTES
Pt BIBA wearing C collar after unwitnessed fall with unknown cause. Pt does not remember events leading up to fall, how long she was on ground nor is she had LOC. Pt is not on thinners. Pt was confused to everything but self upon EMS arrival, pt now A&Ox4. Pt complaining of neck pain, headache, worsening chronic back pain, pt vomitng on arrival. Pt received 4 mg Zofran, 50 micrograms Fentanyl.

## 2023-12-30 NOTE — ED PROVIDER NOTES
History     Chief Complaint:  Fall     The history is provided by the EMS personnel.      Meredith Allen is an 83 year old female who presents via EMS due to a fall. Patient lives alone in independent living. She had an unwitnessed fall earlier today, and one of her neighbors heard her calls for help. EMS report a BP of 220/110, blood sugar of 308, and mild tachycardia at time of arrival. EMS reports patient does not remember falling or loss of consciousness, but presents with a hematoma above her left eye and pain in the back of her head. She does not know when she fell or how long she was on the floor. Patient endorses neck pain, abdominal pain, and back pain, stating that the back and abdominal pain are chronic. 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 After discussion with EMS, she appeared to have a better recollection of date/time/location. She was given 50 mcg of fentanyl for pain and 4 mg Zofran for nausea. Patient had emesis a couple of minutes before arrival at 1725.     Independent Historian:   EMS - They report primary information    Review of External Notes:   I reviewed urgent care note from 12/14/2023    Medications:    Baby aspirin  Premarin  Fenofibrate  Glucosamine-chondroitin  Metoprolol  Crestor  Lexapro  Amaryl  zofran    Past Medical History:    Back pain  Hyperlipidemia  Migraines  Chest pain  Depression  Hypertension  Tinea unguium  Hyperglycemia  Tachycardia  Sleep apnea    Past Surgical History:    Cholecystectomy  Colonoscopy  Cystectomy  Hysterectomy  Phacoemulsification clear cornea   Bladder repair  Salpingo-oophorectomy  Cataract removal  bunionectomy    Physical Exam   Patient Vitals for the past 24 hrs:   BP Temp Temp src Pulse Resp SpO2 Weight   12/30/23 2037 -- -- -- 112 21 -- --   12/30/23 2021 -- -- -- 110 17 93 % --   12/30/23 2007 (!) 168/82 -- -- 111 13 95 % --   12/30/23 2002 -- -- -- -- -- 96 % --   12/30/23  2001 (!) 168/82 -- -- 110 -- -- --   12/30/23 1915 (!) 190/94 -- -- 112 19 92 % --   12/30/23 1859 (!) 206/104 -- -- 110 16 93 % --   12/30/23 1845 (!) 184/95 -- -- 109 17 94 % --   12/30/23 1834 (!) 188/111 98.4  F (36.9  C) Temporal 112 26 93 % --   12/30/23 1814 (!) 208/98 -- -- 102 15 94 % --   12/30/23 1805 -- -- -- 108 12 93 % 75.3 kg (166 lb 0.1 oz)   12/30/23 1804 -- -- -- 110 14 -- --   12/30/23 1800 (!) 191/115 -- -- 112 -- -- --   12/30/23 1735 (!) 215/107 -- -- -- -- -- --        Physical Exam  Nursing note and vitals reviewed.  Constitutional:  Awake and alert but appears uncomfortable and somewhat confused. Cooperative. In a c-collar.  HENT:   Nose:    Nose normal.   Mouth/Throat:   Mucous membranes are normal.   Eyes:    Conjunctivae normal and EOM are normal.      Pupils are equal, round, and reactive to light.   Neck:    Trachea normal.   Cardiovascular:  Normal rate, regular rhythm, normal heart sounds and normal pulses. No murmur heard.  Pulmonary/Chest:  Effort normal and breath sounds normal.   Abdominal:   Soft. Normal appearance and bowel sounds are normal.      There is some diffuse tenderness to palpation.      There is no rebound and no CVA tenderness.   Musculoskeletal:  Tenderness to palpation of the cervical spine region.  Extremities atraumatic x 4.   Lymphadenopathy:  No cervical adenopathy.   Neurological:   Awake but somewhat confused. Normal strength.      No cranial nerve deficit or sensory deficit. GCS eye subscore is 4. GCS verbal subscore is 5. GCS motor subscore is 6.   Skin:    Abrasion and contusion to the forehead region. No rash noted.   Psychiatric:   Somewhat anxious.    Emergency Department Course   ECG  ECG taken at 1805, ECG read at 1815  Sinus tachycardia with fusion complexes  Possible left atrial enlargement  Marked ST abnormality, possible lateral subendocardial injury   Rate 102 bpm. TX interval 180 ms. QRS duration 80 ms. QT/QTc 388/505 ms. P-R-T axes 58 46 72.      Imaging:  CTA Head Neck with Contrast   Final Result   IMPRESSION:       HEAD CTA:    1.  Normal CTA [place].      NECK CTA:   1.  Mild-to-moderate carotid bifurcation stenoses and mild stenosis at left vertebral artery origin. Negative for dissection.      CT Abdomen Pelvis w Contrast   Final Result   IMPRESSION:    1. No acute traumatic findings in the abdomen or pelvis.   2. Few areas of heterogeneous low-attenuation in both kidneys, indeterminate. Findings could be seen in the setting of pyelonephritis or sequelae of chronic scarring. Please correlate clinically for any signs and/or symptoms of acute infection.      CT Cervical Spine w/o Contrast   Final Result   IMPRESSION:   1.  No fracture or posttraumatic subluxation.   2.  No high-grade spinal canal or neural foraminal stenosis. Cervical spondylosis.      CT Head w/o Contrast   Final Result   IMPRESSION:   1.  Atrophy and chronic vascular changes. Negative for acute intracranial process      2.  Superficial soft tissue swelling.       Report per radiology.    Laboratory:  Labs Ordered and Resulted from Time of ED Arrival to Time of ED Departure   COMPREHENSIVE METABOLIC PANEL - Abnormal       Result Value    Sodium 137      Potassium 4.3      Carbon Dioxide (CO2) 26      Anion Gap 13      Urea Nitrogen 23.3 (*)     Creatinine 0.77      GFR Estimate 76      Calcium 9.5      Chloride 98      Glucose 252 (*)     Alkaline Phosphatase 101      AST 23      ALT 14      Protein Total 7.3      Albumin 4.0      Bilirubin Total 0.5     TROPONIN T, HIGH SENSITIVITY - Abnormal    Troponin T, High Sensitivity 15 (*)    CBC WITH PLATELETS AND DIFFERENTIAL - Abnormal    WBC Count 15.6 (*)     RBC Count 5.15      Hemoglobin 15.2      Hematocrit 46.6      MCV 91      MCH 29.5      MCHC 32.6      RDW 12.7      Platelet Count 320      % Neutrophils 83      % Lymphocytes 11      % Monocytes 5      % Eosinophils 1      % Basophils 0      % Immature Granulocytes 0       NRBCs per 100 WBC 0      Absolute Neutrophils 12.8 (*)     Absolute Lymphocytes 1.7      Absolute Monocytes 0.8      Absolute Eosinophils 0.1      Absolute Basophils 0.1      Absolute Immature Granulocytes 0.1      Absolute NRBCs 0.0     ISTAT GASES LACTATE VENOUS POCT - Abnormal    Lactic Acid POCT 0.9      Bicarbonate Venous POCT 29 (*)     O2 Sat, Venous POCT 52 (*)     pCO2 Venous POCT 51 (*)     pH Venous POCT 7.37      pO2 Venous POCT 29     LIPASE - Normal    Lipase 43     MAGNESIUM - Normal    Magnesium 2.0     CK TOTAL - Normal    CK 59     ROUTINE UA WITH MICROSCOPIC REFLEX TO CULTURE   ISTAT CREATININE POCT   TYPE AND SCREEN, ADULT    ABO/RH(D) O POS      Antibody Screen Negative      SPECIMEN EXPIRATION DATE [id number]     BLOOD CULTURE   BLOOD CULTURE   ABO/RH TYPE AND SCREEN       Emergency Department Course & Assessments:    Interventions:  Medications   ondansetron (ZOFRAN) injection 4 mg (has no administration in time range)   ondansetron (ZOFRAN) injection 4 mg (4 mg Intravenous $Given 12/30/23 1728)   iopamidol (ISOVUE-370) solution 90 mL (90 mLs Intravenous $Given 12/30/23 1738)   sodium chloride 0.9 % bag 500mL for CT scan flush use (66 mLs Intravenous $Given 12/30/23 1738)   diphenhydrAMINE (BENADRYL) injection 25 mg (25 mg Intravenous $Given 12/30/23 1801)   prochlorperazine (COMPAZINE) injection 5 mg (5 mg Intravenous $Given 12/30/23 1801)      Independent Interpretation (X-rays, CTs, rhythm strip):  I independently interpreted the Head CT and I agree with the negative reading for intracranial hemorrhage.    Assessments/Consultations/Discussion of Management or Tests:  ED Course as of 12/30/23 2041   Sat Dec 30, 2023   1725 I obtained history and examined the patient as noted above.     1944 I spoke with Dr. Russell of the hospitalist team regarding the patient, who accepted the patient for admission.         Social Determinants of Health affecting care:   None    Disposition:  The  patient was admitted to the hospital under the care of Dr. Russell.     Impression & Plan    Medical Decision Making:  This is an 83-year-old female brought in by EMS after she apparently fell in her apartment.  She came in somewhat confused with an obvious head injury.  She also was hypertensive and vomiting.  Therefore I was concerned for an intracranial hemorrhage, as well as a cervical spine fracture or possibly a stroke.  It also is unclear how long she was on the floor or what caused her to fall.  Therefore I was concerned that she could have had a syncopal event or possibly a stroke.  Her presentation however does not seem consistent with a stroke.  She had the above imaging studies obtained as well as the above blood work, EKG, and urine.  She required multiple doses of antiemetics to control her vomiting.  Thankfully, her imaging studies are unremarkable.  I suspect that she does have a concussion and probably a severe concussion.  I do not think this is a hypertensive urgency or crisis, as her blood pressure did come down on its own.  She does not appear to have any signs of an infection.  I think it is best that she come into the hospital for further evaluation and management.  I subsequently spoke with Dr. Russell, who will be taking care of her.    Diagnosis:    ICD-10-CM    1. Head injury, initial encounter  S09.90XA       2. Concussion with unknown loss of consciousness status, initial encounter  S06.0XAA            Scribe Disclosure:  IMartinez, am serving as a scribe at 5:41 PM on 12/30/2023 to document services personally performed by Wan Braden MD, based on my observations and the provider's statements to me.    I, Florencia Contreras, am serving as a scribe at 5:50 PM on 12/30/2023 to document services personally performed by Wan Braden MD based on my observations and the provider's statements to me.      12/30/2023   Wan Braden MD [place]  Wan BOYCE MD  12/30/23 3241

## 2023-12-31 ENCOUNTER — APPOINTMENT (OUTPATIENT)
Dept: PHYSICAL THERAPY | Facility: CLINIC | Age: 83
End: 2023-12-31
Attending: HOSPITALIST
Payer: COMMERCIAL

## 2023-12-31 ENCOUNTER — APPOINTMENT (OUTPATIENT)
Dept: CARDIOLOGY | Facility: CLINIC | Age: 83
End: 2023-12-31
Attending: HOSPITALIST
Payer: COMMERCIAL

## 2023-12-31 LAB
ALBUMIN SERPL BCG-MCNC: 3.6 G/DL (ref 3.5–5.2)
ALP SERPL-CCNC: 83 U/L (ref 40–150)
ALT SERPL W P-5'-P-CCNC: 11 U/L (ref 0–50)
ANION GAP SERPL CALCULATED.3IONS-SCNC: 9 MMOL/L (ref 7–15)
AST SERPL W P-5'-P-CCNC: 17 U/L (ref 0–45)
BASOPHILS # BLD AUTO: 0 10E3/UL (ref 0–0.2)
BASOPHILS NFR BLD AUTO: 0 %
BILIRUB SERPL-MCNC: 0.6 MG/DL
BUN SERPL-MCNC: 19.1 MG/DL (ref 8–23)
CALCIUM SERPL-MCNC: 9.2 MG/DL (ref 8.8–10.2)
CHLORIDE SERPL-SCNC: 99 MMOL/L (ref 98–107)
CORTIS SERPL-MCNC: 15.6 UG/DL
CREAT SERPL-MCNC: 0.8 MG/DL (ref 0.51–0.95)
DEPRECATED HCO3 PLAS-SCNC: 29 MMOL/L (ref 22–29)
EGFRCR SERPLBLD CKD-EPI 2021: 73 ML/MIN/1.73M2
EOSINOPHIL # BLD AUTO: 0 10E3/UL (ref 0–0.7)
EOSINOPHIL NFR BLD AUTO: 0 %
ERYTHROCYTE [DISTWIDTH] IN BLOOD BY AUTOMATED COUNT: 12.7 % (ref 10–15)
GLUCOSE BLDC GLUCOMTR-MCNC: 161 MG/DL (ref 70–99)
GLUCOSE BLDC GLUCOMTR-MCNC: 217 MG/DL (ref 70–99)
GLUCOSE BLDC GLUCOMTR-MCNC: 229 MG/DL (ref 70–99)
GLUCOSE BLDC GLUCOMTR-MCNC: 235 MG/DL (ref 70–99)
GLUCOSE BLDC GLUCOMTR-MCNC: 260 MG/DL (ref 70–99)
GLUCOSE BLDC GLUCOMTR-MCNC: 281 MG/DL (ref 70–99)
GLUCOSE SERPL-MCNC: 158 MG/DL (ref 70–99)
HBA1C MFR BLD: 8.6 %
HCT VFR BLD AUTO: 39.4 % (ref 35–47)
HGB BLD-MCNC: 12.7 G/DL (ref 11.7–15.7)
IMM GRANULOCYTES # BLD: 0 10E3/UL
IMM GRANULOCYTES NFR BLD: 0 %
LVEF ECHO: NORMAL
LYMPHOCYTES # BLD AUTO: 2.4 10E3/UL (ref 0.8–5.3)
LYMPHOCYTES NFR BLD AUTO: 22 %
MCH RBC QN AUTO: 29.1 PG (ref 26.5–33)
MCHC RBC AUTO-ENTMCNC: 32.2 G/DL (ref 31.5–36.5)
MCV RBC AUTO: 90 FL (ref 78–100)
MONOCYTES # BLD AUTO: 0.9 10E3/UL (ref 0–1.3)
MONOCYTES NFR BLD AUTO: 9 %
NEUTROPHILS # BLD AUTO: 7.5 10E3/UL (ref 1.6–8.3)
NEUTROPHILS NFR BLD AUTO: 69 %
NRBC # BLD AUTO: 0 10E3/UL
NRBC BLD AUTO-RTO: 0 /100
PLATELET # BLD AUTO: 274 10E3/UL (ref 150–450)
POTASSIUM SERPL-SCNC: 3.5 MMOL/L (ref 3.4–5.3)
PROT SERPL-MCNC: 6.4 G/DL (ref 6.4–8.3)
RBC # BLD AUTO: 4.36 10E6/UL (ref 3.8–5.2)
SODIUM SERPL-SCNC: 137 MMOL/L (ref 135–145)
TROPONIN T SERPL HS-MCNC: 15 NG/L
TROPONIN T SERPL HS-MCNC: 16 NG/L
TROPONIN T SERPL HS-MCNC: 17 NG/L
WBC # BLD AUTO: 10.9 10E3/UL (ref 4–11)

## 2023-12-31 PROCEDURE — 80053 COMPREHEN METABOLIC PANEL: CPT | Performed by: HOSPITALIST

## 2023-12-31 PROCEDURE — G0378 HOSPITAL OBSERVATION PER HR: HCPCS

## 2023-12-31 PROCEDURE — 82962 GLUCOSE BLOOD TEST: CPT

## 2023-12-31 PROCEDURE — 36415 COLL VENOUS BLD VENIPUNCTURE: CPT | Performed by: INTERNAL MEDICINE

## 2023-12-31 PROCEDURE — 258N000003 HC RX IP 258 OP 636: Performed by: HOSPITALIST

## 2023-12-31 PROCEDURE — 84484 ASSAY OF TROPONIN QUANT: CPT | Performed by: HOSPITALIST

## 2023-12-31 PROCEDURE — 85025 COMPLETE CBC W/AUTO DIFF WBC: CPT | Performed by: HOSPITALIST

## 2023-12-31 PROCEDURE — 93306 TTE W/DOPPLER COMPLETE: CPT | Mod: 26 | Performed by: INTERNAL MEDICINE

## 2023-12-31 PROCEDURE — 82533 TOTAL CORTISOL: CPT | Performed by: INTERNAL MEDICINE

## 2023-12-31 PROCEDURE — 97116 GAIT TRAINING THERAPY: CPT | Mod: GP | Performed by: PHYSICAL THERAPIST

## 2023-12-31 PROCEDURE — 99232 SBSQ HOSP IP/OBS MODERATE 35: CPT | Performed by: INTERNAL MEDICINE

## 2023-12-31 PROCEDURE — 97530 THERAPEUTIC ACTIVITIES: CPT | Mod: GP | Performed by: PHYSICAL THERAPIST

## 2023-12-31 PROCEDURE — 250N000013 HC RX MED GY IP 250 OP 250 PS 637: Performed by: HOSPITALIST

## 2023-12-31 PROCEDURE — 255N000002 HC RX 255 OP 636: Performed by: HOSPITALIST

## 2023-12-31 PROCEDURE — 97161 PT EVAL LOW COMPLEX 20 MIN: CPT | Mod: GP | Performed by: PHYSICAL THERAPIST

## 2023-12-31 PROCEDURE — 84484 ASSAY OF TROPONIN QUANT: CPT | Performed by: INTERNAL MEDICINE

## 2023-12-31 PROCEDURE — C8929 TTE W OR WO FOL WCON,DOPPLER: HCPCS

## 2023-12-31 PROCEDURE — 258N000003 HC RX IP 258 OP 636: Performed by: INTERNAL MEDICINE

## 2023-12-31 PROCEDURE — 250N000013 HC RX MED GY IP 250 OP 250 PS 637: Performed by: INTERNAL MEDICINE

## 2023-12-31 PROCEDURE — 36415 COLL VENOUS BLD VENIPUNCTURE: CPT | Performed by: HOSPITALIST

## 2023-12-31 RX ORDER — ACETAMINOPHEN 650 MG/1
650 SUPPOSITORY RECTAL EVERY 4 HOURS PRN
Status: DISCONTINUED | OUTPATIENT
Start: 2023-12-31 | End: 2024-01-02 | Stop reason: HOSPADM

## 2023-12-31 RX ORDER — ACETAMINOPHEN 325 MG/1
650 TABLET ORAL EVERY 4 HOURS PRN
Status: DISCONTINUED | OUTPATIENT
Start: 2023-12-31 | End: 2024-01-02 | Stop reason: HOSPADM

## 2023-12-31 RX ADMIN — SODIUM CHLORIDE: 9 INJECTION, SOLUTION INTRAVENOUS at 16:47

## 2023-12-31 RX ADMIN — SODIUM CHLORIDE: 9 INJECTION, SOLUTION INTRAVENOUS at 09:10

## 2023-12-31 RX ADMIN — ESCITALOPRAM OXALATE 10 MG: 10 TABLET ORAL at 07:39

## 2023-12-31 RX ADMIN — INSULIN ASPART 2 UNITS: 100 INJECTION, SOLUTION INTRAVENOUS; SUBCUTANEOUS at 22:02

## 2023-12-31 RX ADMIN — SODIUM CHLORIDE, POTASSIUM CHLORIDE, SODIUM LACTATE AND CALCIUM CHLORIDE 1000 ML: 600; 310; 30; 20 INJECTION, SOLUTION INTRAVENOUS at 13:49

## 2023-12-31 RX ADMIN — HUMAN ALBUMIN MICROSPHERES AND PERFLUTREN 9 ML: 10; .22 INJECTION, SOLUTION INTRAVENOUS at 15:25

## 2023-12-31 RX ADMIN — ACETAMINOPHEN 650 MG: 325 TABLET, FILM COATED ORAL at 20:28

## 2023-12-31 ASSESSMENT — ACTIVITIES OF DAILY LIVING (ADL)
ADLS_ACUITY_SCORE: 35
ADLS_ACUITY_SCORE: 34
ADLS_ACUITY_SCORE: 33
ADLS_ACUITY_SCORE: 33
ADLS_ACUITY_SCORE: 35
ADLS_ACUITY_SCORE: 34
ADLS_ACUITY_SCORE: 35
ADLS_ACUITY_SCORE: 34
ADLS_ACUITY_SCORE: 34
ADLS_ACUITY_SCORE: 33
ADLS_ACUITY_SCORE: 33
ADLS_ACUITY_SCORE: 35

## 2023-12-31 NOTE — PROGRESS NOTES
Observation goals  PRIOR TO DISCHARGE        Comments:     -diagnostic tests and consults completed and resulted- NOT MET    -vital signs normal or at patient baseline- partially  MET    Nurse to notify provider when observation goals have been met and patient is ready for discharge.

## 2023-12-31 NOTE — ED NOTES
Pt transported to [facility] via stretcher with aid. No complaints at this time.

## 2023-12-31 NOTE — PROGRESS NOTES
"   12/31/23 1519   Appointment Info   Signing Clinician's Name / Credentials (PT) Angela Biswas PT   Living Environment   People in Home alone   Current Living Arrangements apartment   Home Accessibility no concerns   Transportation Anticipated car, drives self   Living Environment Comments pt lives alone in regular apt, has been  since this past summer.   Self-Care   Usual Activity Tolerance excellent   Current Activity Tolerance moderate   Regular Exercise No   Equipment Currently Used at Home walker, rolling   Fall history within last six months yes   Number of times patient has fallen within last six months 1   General Information   Onset of Illness/Injury or Date of Surgery 12/30/23   Referring Physician Dr Russell   Patient/Family Therapy Goals Statement (PT) Pt hopes to go home soon   Pertinent History of Current Problem (include personal factors and/or comorbidities that impact the POC) Pt had unwitnessed fall in her apt 12/30/23, doesn't not remember what happened, hit back of her head. PMH includes chronic back pain.   Existing Precautions/Restrictions fall  (orthostatic hypotension this am)   Cognition   Affect/Mental Status (Cognition) confused   Cognitive Status Comments pt confused and tangential, knows name and \"hospital\" but off on date. Knows she fell and hit her head, has a hard time answering questions as she easily gets off track.   Pain Assessment   Patient Currently in Pain No   Integumentary/Edema   Integumentary/Edema no deficits were identifed   Integumentary/Edema Comments bruising to outer corner of L eye   Posture    Posture Forward head position   Range of Motion (ROM)   Range of Motion ROM is WFL   Strength (Manual Muscle Testing)   Strength (Manual Muscle Testing) Deficits observed during functional mobility   Bed Mobility   Comment, (Bed Mobility) supine to/from sit with rail and SBA   Transfers   Comment, (Transfers) sit to stand with cues for hand placement and CGA "   Gait/Stairs (Locomotion)   Comment, (Gait/Stairs) Gait to bathroom with wh walker and min assist 1 for steering walker/balance.   Balance   Balance Comments mildly unsteady with standing   Sensory Examination   Sensory Perception patient reports no sensory changes   Coordination   Coordination no deficits were identified   Muscle Tone   Muscle Tone no deficits were identified   Clinical Impression   Criteria for Skilled Therapeutic Intervention Yes, treatment indicated   PT Diagnosis (PT) impaired functional mobility   Influenced by the following impairments decreased strength, genl deconditioning   Functional limitations due to impairments fall risk, decreased IND with mobility   Clinical Presentation (PT Evaluation Complexity) stable   Clinical Presentation Rationale per medical record   Clinical Decision Making (Complexity) low complexity   Planned Therapy Interventions (PT) balance training;gait training;strengthening;transfer training;home exercise program;neuromuscular re-education   Risk & Benefits of therapy have been explained evaluation/treatment results reviewed;care plan/treatment goals reviewed;risks/benefits reviewed;current/potential barriers reviewed   PT Total Evaluation Time   PT Eval, Low Complexity Minutes (84029) 15   Physical Therapy Goals   PT Frequency 5x/week   PT Predicted Duration/Target Date for Goal Attainment 01/08/24   PT Goals Bed Mobility;Transfers;Gait   PT: Bed Mobility Independent;Supine to/from sit   PT: Transfers Sit to/from stand;Bed to/from chair;Assistive device;Modified independent   PT: Gait Modified independent;Rolling walker;150 feet   Interventions   Interventions Quick Adds Gait Training;Therapeutic Activity   Therapeutic Activity   Therapeutic Activities: dynamic activities to improve functional performance Minutes (76053) 16   Symptoms Noted During/After Treatment Fatigue   Treatment Detail/Skilled Intervention PT - orthostatics performed, see VS flowsheet for  details. Very little change in BPs in different positions, no dizziness noted, orthostatics negative. Sit to stand x 5 total with cues each time for hand placement and CGA, pt forgetful and easily distracted. Toilet transfer with min assist (low toilet), able to don/doff brief and clean up after toileting with supervision. Sit to supine with rail and SBA, able to scoot up in bed with cues and roll with supervision.   Gait Training   Gait Training Minutes (81230) 14   Symptoms Noted During/After Treatment (Gait Training) fatigue   Treatment Detail/Skilled Intervention PT - gt training in hallway with wh walker and CGA, min assist at times d/t needing help steering walker and mildly unsteady, needed cues to find room almost walked into wrong room. Back in bed post PT, had planned chair but ECHO tech came to do ECHO and needed pt in bed. Alarm on and all needs in reach.   Distance in Feet 60'   [place] Level (Gait Training) minimum assist (75% patient effort)   Physical Assistance Level (Gait Training) 1 person assist   PT Discharge Planning   PT Plan Progress gait distance, monitor BP (orthostatics).   PT Discharge Recommendation (DC Rec) Transitional Care Facility   PT Rationale for DC Rec Pt is well below baseline of mod IND mobility with walker, pt drives and lives INDly in an apt. Currently she is confused and has no memory of the fall yesterday, needs min assist for mobility and is unsteady/at risk for falling. Rec TCU. If she does not go to TCU would recommend assist with all mobility d/t high fall risk.   PT Brief overview of current status min assist transfers and gait 60' and walker   Total Session Time   Timed Code Treatment Minutes 30   Total Session Time (sum of timed and untimed services) 45     [facility]  OUTPATIENT PHYSICAL THERAPY EVALUATION  PLAN OF TREATMENT FOR OUTPATIENT REHABILITATION  (COMPLETE FOR INITIAL CLAIMS ONLY)  Patient's Last Name, First Name,  M.I.  YOB: 1940  Meredith Allen                        Provider's Name  [facility] Medical Record No.  [id number]                             Onset Date:  12/30/23   Start of Care Date:      Type:     _X_PT   ___OT   ___SLP Medical Diagnosis:                 PT Diagnosis:  impaired functional mobility Visits from SOC:  1     See note for plan of treatment, functional goals and certification details    I CERTIFY THE NEED FOR THESE SERVICES FURNISHED UNDER        THIS PLAN OF TREATMENT AND WHILE UNDER MY CARE     (Physician co-signature of this document indicates review and certification of the therapy plan).

## 2023-12-31 NOTE — PROGRESS NOTES
Observation goals  PRIOR TO DISCHARGE        Comments:   -diagnostic tests and consults completed and resulted- NOT MET  -vital signs normal or at patient baseline- NOT MET  Nurse to notify provider when observation goals have been met and patient is ready for discharge.

## 2023-12-31 NOTE — ED NOTES
[facility]  ED Nurse Handoff Report    ED Chief complaint: Fall      ED Diagnosis:   Final diagnoses:   Head injury, initial encounter   Concussion with unknown loss of consciousness status, initial encounter       Code Status: Full Code    Allergies:   Allergies   Allergen Reactions    Sulfa Antibiotics        Patient Story: pt arrives via EMS from independent living facility where she had an unwitnessed fall today. Pt lives alone, passerby heard her calls for help. EMS reports patient does not remember falling or loss of consciousness, but presents with a hematoma above her left eye and pain in the back of her head. She does not know when she fell or how long she was on the ground. Upon arrival of EMS pt was confused and only oriented to person.     Pt is now A&Ox4.     Focused Assessment:  hematoma above L eye    Treatments and/or interventions provided: see chart  Patient's response to treatments and/or interventions: tolerated well    To be done/followed up on inpatient unit:  see chart    Does this patient have any cognitive concerns?: Forgetful    Activity level - Baseline/Home:  Independent  Activity Level - Current:   Unknown    Patient's Preferred language: English   Needed?: No    Isolation: None  Infection: Not Applicable  Patient tested for COVID 19 prior to admission: NO  Bariatric?: No    Vital Signs:   Vitals:    12/30/23 1915 12/30/23 2001 12/30/23 2002 12/30/23 2021   BP: (!) 190/94 (!) 168/82     Pulse: 112 110  110   Resp: 19   17   Temp:       TempSrc:       SpO2: 92%  96% 93%   Weight:           Cardiac Rhythm:     Was the PSS-3 completed:   Yes  What interventions are required if any?               Family Comments:     OBS brochure/video discussed/provided to patient/family: No              Name of person given brochure if not patient:                 Relationship to patient:       For the majority of the shift this patient's behavior was Green.   Behavioral  interventions performed were   .    ED NURSE PHONE NUMBER: [phone]

## 2023-12-31 NOTE — PROVIDER NOTIFICATION
MD Notification    Notified Person: MD    Notified Person Name:Dr. Russell    Notification Date/Time:12/30/23 @2248    Notification Interaction: [employer]    Purpose of Notification:Patients HR drops from 110 to 55 when going from lying to standing.  Please call with any new orders.    Orders Received:    Comments:

## 2023-12-31 NOTE — PLAN OF CARE
Goal Outcome Evaluation:      Plan of Care Reviewed With: patient    PRIMARY Concern: Pt here with syncope, fall at independent living place, laceration to the L upper eye. Pt has no recollection of how she fell or events prior to it.   SAFETY RISK Concerns (fall risk, behaviors, etc.): fall risk       Isolation/Type: none  Tests/Procedures for NEXT shift: echo done result pending. PT recommending TCU  Consults? (Pending/following, signed-off?) PT following   Where is patient from? (Home, TCU, etc.): Ind living place  Other Important info for NEXT shift: ortho positive. Bolus X 1 given. Repeat ortho negative. Ortho VS q8 shift.  Anticipated DC date & active delays: tomorrow     SUMMARY NOTE:  Orientation/Cognitive: A/O X4. Forgetful.   Observation Goals (Met/ Not Met): not met   Mobility Level/Assist Equipment: up with 1 walker/GB  Antibiotics & Plan (IV/po, length of tx left): none  Pain Management: denies pain   Tele/VS/O2: Ortho positive. RA SR  ABNL Lab/BG: BG checks ACHS  Diet: regular  Bowel/Bladder: continence   Skin Concerns: L eye lac, bruises,dry skin   Drains/Devices: PIV  Patient Stated Goal for Today: to get better

## 2023-12-31 NOTE — PROGRESS NOTES
Admission/Transfer from: ED  2 RN skin assessment completed. Yes  Name of 2nd RN: JOSHUA RN  Significant findings include: Abrasion and bruising above L eye, scattered bruises to hands.  WOC Nurse Consult Ordered? No

## 2023-12-31 NOTE — PROGRESS NOTES
[facility]    Medicine Progress Note - Hospitalist Service    Date of Admission:  12/30/2023    Assessment & Plan   Meredith Allen is a 83 year old female admitted on 12/30/2023.  She has a past medical history of chronic back pain, hyperlipidemia, hypertension, depression, diabetes.  She presented after an unwitnessed fall.     # Unwitnessed fall, rule out syncope  # Rule out pyelonephritis  # Troponin elevation, likely demand ischemia  -Presented with fall with vague history, unwitnessed  -EKG with sinus tachycardia no obvious ischemic change fair amount of baseline artifact  -She does have some abdominal pain and some back pain.  Back pain reportedly low back pain and chronic for her however she feels like it slightly worse.  -CT abdomen was done to rule out intra-abdominal issues and was notable for some heterogeneous low-attenuation in both kidneys which could be seen in the setting of pyelonephritis or sequelae of chronic scarring.  Patient's urinalysis however is noninfectious.  Her kidney function is also within normal limits.    Plan  - register to observation  - Serial troponins  - Orthostatic vital signs every shift  - give IV fluids  - Echocardiogram  -- anticipate discharge with event monitor       # Soft tissue injury of the head  -Head ct and c spine ct were negative for acute pathology  -No other obvious signs of trauma  -Low back pain may be chronic per the patient  Plan  - q 4 hour neuro checks        # Lower acute on chronic back pain  - Given her fall CT L-spine was obtained which showed no evidence of fracture or high-grade spinal canal or neuroforaminal stenosis     Accelerated hypertension  Blood pressure initially 220 but this improved after IV Benadryl use for nausea  Plan  - As needed hydralazine for systolic blood pressures greater than 180  - Resume home medications once verified by pharmacy        Type 2 diabetes  Plan  - Sliding scale insulin and diabetic  diet          Observation Goals: -diagnostic tests and consults completed and resulted, -vital signs normal or at patient baseline, Nurse to notify provider when observation goals have been met and patient is ready for discharge.  Diet: Advance Diet as Tolerated: Regular Diet Adult    DVT Prophylaxis: Pneumatic Compression Devices  Saavedra Catheter: Not present  Lines: None     Cardiac Monitoring: ACTIVE order. Indication: Syncope- low cardiac risk (24 hours)  Code Status: Full Code      Clinically Significant Risk Factors Present on Admission                # Drug Induced Platelet Defect: home medication list includes an antiplatelet medication       # DMII: A1C = 8.6 % (Ref range: <5.7 %) within past 6 months               Disposition Plan pending PT evaluation, TTE. Discharge 1/1/24     Expected Discharge Date: 01/01/2024        Discharge Comments: pyelonephritis  echo  PT  Neuro checks  orthostatic BP's            FARZANA MCLEOD MD  Hospitalist Service  [facility]  Securely message with [email or web address] (more info)  Text page via [employer] Paging/Directory   ______________________________________________________________________    Interval History   Reports falling backwards and doesn't know how long she was on the floor. No prodromal symptoms. Woke up and called for help. Had positive orthostatic VS    Physical Exam   Vital Signs: Temp: 98.4  F (36.9  C) Temp src: Oral BP: 116/59 Pulse: 83   Resp: 18 SpO2: 97 % O2 Device: None (Room air)    Weight: 166 lbs .1 oz      Medical Decision Making   General Appearance: Lying comfortably in bed, on room air, in no acute distress of discomfort  HEENT: PERRL: left periorbital echhymosis, EOMI; moist mucous membrane w/o lesions  Neck: No JVD  Pulmonary: Clear to auscultation bilaterally, no wheezes or crackles  CVS: Regular rhythm, systolic murmur, no rubs or gallops  GI: BS (+), soft nontender, no rebound or guarding   Extremities: No LE edema; pulses strong  and equal throughout   Skin: No rashes or lesions  Neurologic: A&O x3      45 MINUTES SPENT BY ME on the date of service doing chart review, history, exam, documentation & further activities per the note.      Data   ------------------------- PAST 24 HR DATA REVIEWED -----------------------------------------------    I have personally reviewed the following data over the past 24 hrs:    10.9  \   12.7   / 274     137 99 19.1 /  217 (H)   3.5 29 0.80 \     ALT: 11 AST: 17 AP: 83 TBILI: 0.6   ALB: 3.6 TOT PROTEIN: 6.4 LIPASE: N/A     Trop: 16 (H) BNP: N/A     TSH: N/A T4: N/A A1C: 8.6 (H)     Procal: N/A CRP: N/A Lactic Acid: 0.9         Imaging results reviewed over the past 24 hrs:   Recent Results (from the past 24 hour(s))   CT Head w/o Contrast    Narrative    EXAM: CT HEAD W/O CONTRAST  LOCATION: [facility]  DATE: 12/30/2023    INDICATION: fall, head injury. Pain.  COMPARISON: CT brain 10/27/2021  TECHNIQUE: Routine CT Head without IV contrast. Multiplanar reformats. Dose reduction techniques were used.    FINDINGS:  INTRACRANIAL CONTENTS: No intracranial hemorrhage, extraaxial collection, or mass effect.  No CT evidence of acute infarct. Mild presumed chronic small vessel ischemic changes. Mild generalized volume loss. No hydrocephalus. Skull base vascular   calcifications     VISUALIZED ORBITS/SINUSES/MASTOIDS: Prior bilateral cataract surgery. Visualized portions of the orbits are otherwise unremarkable. Acute or chronic sphenoid sinus inflammatory changes with chronic reactive osteitis and secretions. No bone erosion. The   other sinuses are unremarkable. No middle ear or mastoid effusion.    BONES/SOFT TISSUES: Left malar soft tissue swelling extending over the left side coma and left lateral orbit wall. No associated fracture. Negative for calvarial fracture. No evidence for deep orbital injury. Temporomandibular joints unremarkable.      Impression    IMPRESSION:  1.  Atrophy and  chronic vascular changes. Negative for acute intracranial process    2.  Superficial soft tissue swelling.   CT Cervical Spine w/o Contrast    Narrative    EXAM: CT CERVICAL SPINE W/O CONTRAST  LOCATION: [facility]  DATE: 12/30/2023    INDICATION: fall, neck pain  COMPARISON: None.  TECHNIQUE: Routine CT Cervical Spine without IV contrast. Multiplanar reformats. Dose reduction techniques were used.    FINDINGS:  VERTEBRA: Normal vertebral body heights and alignment. No fracture or posttraumatic subluxation.     CANAL/FORAMINA:     C3-C4: Small central disc protrusion. Patent central canal and foramen.    C4-C5: Left facet arthropathy. Minimal uncinate spur. Patent central canal.    C5-C6: Disc osteophyte. Patent central canal. Uncinate spur. Patent central canal.    C6-C7: Disc osteophyte. Patent central canal. Uncinate spur with mild left foraminal stenosis.    PARASPINAL: No extraspinal abnormality.      Impression    IMPRESSION:  1.  No fracture or posttraumatic subluxation.  2.  No high-grade spinal canal or neural foraminal stenosis. Cervical spondylosis.   CT Abdomen Pelvis w Contrast    Narrative    EXAM: CT ABDOMEN PELVIS W CONTRAST  LOCATION: [facility]  DATE: 12/30/2023    INDICATION: Abdominal pain. Fall. Back pain.  COMPARISON: CT abdomen pelvis 07/23/2018 reviewed.  TECHNIQUE: CT scan of the abdomen and pelvis was performed following injection of IV contrast. Multiplanar reformats were obtained. Dose reduction techniques were used.  CONTRAST: 90 mL Isovue-370    FINDINGS:   LOWER CHEST: Mild linear scarring or atelectasis in the lower lungs.    HEPATOBILIARY: Cholecystectomy. Mild biliary prominence from reservoir effect postcholecystectomy, unchanged. Normal liver.    PANCREAS: Normal.    SPLEEN: Normal.    ADRENAL GLANDS: Normal.    KIDNEYS/BLADDER: No urinary stone or hydronephrosis. Few small areas of heterogeneous low-attenuation in both kidneys,  indeterminate. Small benign simple left renal cyst, no follow-up needed.    BOWEL: Normal.    LYMPH NODES: Normal.    VASCULATURE: Mild atherosclerotic calcifications. Normal caliber abdominal aorta.    PELVIC ORGANS: Hysterectomy. Pelvic phleboliths.    MUSCULOSKELETAL: Bony demineralization. Mild scattered degenerative changes in the spine. No acute osseous abnormality.      Impression    IMPRESSION:   1. No acute traumatic findings in the abdomen or pelvis.  2. Few areas of heterogeneous low-attenuation in both kidneys, indeterminate. Findings could be seen in the setting of pyelonephritis or sequelae of chronic scarring. Please correlate clinically for any signs and/or symptoms of acute infection.   CTA Head Neck with Contrast    Narrative    EXAM: CTA HEAD NECK W CONTRAST  LOCATION: [facility]  DATE: 12/30/2023    INDICATION: fall, headache, n v  COMPARISON: None.  CONTRAST: 90 mL Isovue 370  TECHNIQUE: Head and neck CT angiogram with IV contrast. axial helical CT images of the head and neck vessels obtained during the arterial phase of intravenous contrast administration. Axial 2D reconstructed images and multiplanar 3D MIP reconstructed   images of the head and neck vessels were performed by the technologist. Dose reduction techniques were used. All stenosis measurements made according to NASCET criteria unless otherwise specified.    FINDINGS:     HEAD CTA:  ANTERIOR CIRCULATION: No stenosis/occlusion, aneurysm, or high flow vascular malformation. Standard [place] anatomy.    POSTERIOR CIRCULATION: No stenosis/occlusion, aneurysm, or high flow vascular malformation. Balanced vertebral arteries supply a normal basilar artery.     DURAL VENOUS SINUSES: Expected enhancement of the major dural venous sinuses.    NECK CTA:  RIGHT CAROTID: 35% stenosis at the ICA origin with heavily calcified plaque. No dissection.    LEFT CAROTID: 45% stenosis at the ICA origin. Heavily  calcified plaque. No dissection.    VERTEBRAL ARTERIES: Mild stenosis of the left vertebral artery origin with calcified plaque. Remainder of this vessel unremarkable. Right vertebral artery within normal. Balanced vertebral arteries.    AORTIC ARCH: Classic aortic arch anatomy. 25% stenosis at the origin of the left subclavian artery with fibrofatty noncalcified plaque. The other aortic arch vessel origins are unremarkable.    NONVASCULAR STRUCTURES: Mild cervical spondylosis. Sphenoid sinus inflammatory changes with acute and chronic appearance.      Impression    IMPRESSION:     HEAD CTA:   1.  Normal CTA [place].    NECK CTA:  1.  Mild-to-moderate carotid bifurcation stenoses and mild stenosis at left vertebral artery origin. Negative for dissection.   CT Lumbar Spine w/o Contrast    Narrative    EXAM: CT LUMBAR SPINE W/O CONTRAST  LOCATION: [facility]  DATE: 12/30/2023    INDICATION: Fall. Back pain. Traumatic injury.  COMPARISON: None.  TECHNIQUE: Routine CT Lumbar Spine without IV contrast. Multiplanar reformats. Dose reduction techniques were used.     FINDINGS:  VERTEBRA: Decreased osseous mineralization. Normal vertebral body heights. Levoconvex curvature of the lumbar spine. No fracture or posttraumatic subluxation.     CANAL/FORAMINA: Mild multilevel degenerative changes. No high-grade central spinal canal stenosis. No significant neural foraminal stenosis.    PARASPINAL: No extraspinal abnormality.      Impression    IMPRESSION:  1.  No fracture or posttraumatic subluxation.  2.  No high-grade spinal canal or neural foraminal stenosis.

## 2023-12-31 NOTE — PROGRESS NOTES
PRIMARY DIAGNOSIS: SYNCOPE  OUTPATIENT/OBSERVATION GOALS TO BE MET BEFORE DISCHARGE:  1. Orthostatic performed: Yes:          Lying Orthostatic BP: 120/52                   Standing Orthostatic BP: 88/40     2. Diagnostic testing complete & at baseline neurologic testing: Yes.partially  met. Awaiting Echo result     3. Cleared by consultants (if involved): No    4. Interpretation of cardiac rhythm per telemetry tech: SR     5. Tolerating adequate PO diet and medications: Yes    6. Return to near baseline physical activity or neurologic status: Yes    Discharge Planner Nurse   Safe discharge environment identified: Yes  Barriers to discharge: Yes       Entered by: Ani Mueller RN 12/31/2023 12:12 PM     Please review provider order for any additional goals.   Nurse to notify provider when observation goals have been met and patient is ready for discharge.

## 2023-12-31 NOTE — H&P
History and Physical - Hospitalist Service       Date of Admission:  12/30/2023    Assessment & Plan      Meredith Allen is a 83 year old female admitted on 12/30/2023 after an unwitnessed fall.    Unwitnessed fall, rule out syncope  Rule out pyelonephritis  Troponin elevation, likely demand ischemia  Presented with fall with vague history, unwitnessed  EKG with sinus tachycardia no obvious ischemic change fair amount of baseline artifact  She does have some abdominal pain and some back pain.  Back pain reportedly low back pain and chronic for her however she feels like it slightly worse.  CT abdomen was done to rule out intra-abdominal issues and was notable for some changes bilateral kidneys in the right context could be consistent with pyelonephritis but she denies any symptoms of pain with urination or increased urinary frequency  Plan  - register to observation  - Serial troponins  - Orthostatic vital signs every shift  - IV fluids  - Echocardiogram  -Rule out UTI    Soft tissue injury of the head  Head ct and c spine ct were negative for acute pathology  No other obvious signs of trauma  Low back pain may be chronic per the patient  Plan  - q 4 hour neuro checks      Lower acute on chronic back pain  - Given her fall we will obtain a CT of the L-spine    Accelerated hypertension  Blood pressure initially 220 but this improved after IV Benadryl use for nausea  Plan  - As needed hydralazine for systolic blood pressures greater than 180  - Resume home medications once verified by pharmacy      Type 2 diabetes  Plan  - Sliding scale insulin and diabetic diet    Hyperlipidemia  Plan  - Resume home medications once verified by pharmacy        Diet:  Clear advance as tolerates  DVT Prophylaxis: Pneumatic Compression Devices  Saavedra Catheter: Not present  Lines: None     Cardiac Monitoring: None  Code Status:  Full    Clinically Significant Risk Factors Present on Admission                 # Drug Induced Platelet Defect: home medication list includes an antiplatelet medication                   Disposition Plan      Expected Discharge Date: 12/31/2023                  Jai Russell DO  Hospitalist Service  [facility]  Securely message with [employer] (more info)  Text page via [employer] Paging/Directory     ______________________________________________________________________    Chief Complaint   Fall    History is obtained from the patient    History of Present Illness   Meredith Allen is a 83 year old female with past medical history of chronic back pain, hyperlipidemia, depression, hypertension, and diabetes who presents with a fall from her independent living facility.  This was unwitnessed but passerby's heard her calling for help.  On arrival EMS noted a blood pressure of 220/110 with a blood sugar of 308.  The patient was found to have trauma of a hematoma above her left eye and pain in the back of her head.  She did not remember losing consciousness.      Discussed with the patient's daughter who reports that the patient is recently returned home from a vacation in [facility].  She suffered a URI and has been feeling weak.  She is also having increasing acute on chronic lower back pain and told her daughters that she was with to take some Tylenol PM last night.    In the ED she had multiple episodes of nausea and vomiting however the CT scan did not reveal any sort of intracranial process.  He was treated symptomatically with improvement.  Because of chronic abdominal pain a CT abdomen pelvis was obtained that was notable for heterogenous attenuation in both kidneys that could be seen in the setting of pyelonephritis.  UA has not been sent yet.    The patient currently denies any pain outside of the pain in her head.  She denies any dysuria.  She denies increased urinary frequency.      Past Medical History    Past Medical History:   Diagnosis Date    Back  pain     Hyperlipidemia     Migraines        Past Surgical History   Past Surgical History:   Procedure Laterality Date    CHOLECYSTECTOMY      COLONOSCOPY      CYSTECTOMY OVARIAN BENIGN      HYSTERECTOMY      HYSTERECTOMY TOTAL ABDOMINAL, REPAIR BLADDER, COMBINED      PHACOEMULSIFICATION CLEAR CORNEA WITH STANDARD INTRAOCULAR LENS IMPLANT  2/15/2012    Procedure:PHACOEMULSIFICATION CLEAR CORNEA WITH STANDARD INTRAOCULAR LENS IMPLANT; RIGHT PHACOEMULSIFICATION CLEAR CORNEA WITH STANDARD INTRAOCULAR LENS IMPLANT WITH BSS  ; Surgeon:BETSY COHEN; [facility]       Prior to Admission Medications   Prior to Admission Medications   Prescriptions Last Dose Informant Patient Reported? Taking?   Cholecalciferol (VITAMIN D3) 1000 UNITS TABS   Yes No   Sig: Take 1,000 Units by mouth daily.     FENOFIBRATE PO   Yes No   Sig: Take 145 mg by mouth daily.   METOPROLOL TARTRATE PO   Yes No   Sig: Take 25 mg by mouth 2 times daily.   Multiple Vitamin (MULTI-VITAMIN) per tablet   Yes No   Sig: Take 1 tablet by mouth daily.   aspirin EC 81 MG EC tablet   No No   Sig: Take 1 tablet by mouth daily.   calcium-magnesium (CALMAG) 500-250 MG TABS   Yes No   Sig: Take 1 tablet by mouth daily.   co-enzyme Q-10 (CO Q 10) 100 MG CAPS   Yes No   Sig: Take 100 mg by mouth daily.     conjugated estrogens (PREMARIN) vaginal cream   Yes No   Sig: Place  vaginally twice a week. Takes as needed   fish oil-omega-3 fatty acids 1000 MG capsule   Yes No   Sig: Take 2 g by mouth daily.   glucosamine-chondroitin 500-400 MG CAPS   Yes No   Sig: Take 1 capsule by mouth daily.   rosuvastatin (CRESTOR) 20 MG tablet   No No   Sig: Take 1 tablet by mouth daily.      Facility-Administered Medications: None        Review of Systems    The 10 point Review of Systems is negative other than noted in the HPI or here.     Social History   I have reviewed this patient's social history and updated it with pertinent information if needed.  Social History      Tobacco Use    Smoking status: Never   Substance Use Topics    Alcohol use: No         Family History     deferred      Allergies   Allergies   Allergen Reactions    Sulfa Antibiotics         Physical Exam   Vital Signs: Temp: 98.4  F (36.9  C) Temp src: Temporal BP: (!) 168/82 Pulse: 112   Resp: 21 SpO2: 93 % O2 Device: None (Room air)    Weight: 166 lbs .1 oz      Constitutional:             Oriented to person only  HENT:   Mouth/Throat:              Mucous membranes are dry.   Eyes:                           Conjunctivae normal and EOM are normal.                                       Pupils are equal, round, and reactive to light.   Cardiovascular:           Normal rate, regular rhythm, normal heart sounds and normal pulses. No murmur heard.  Pulmonary/Chest:       Effort normal and breath sounds normal.   Abdominal:                  Soft. Normal appearance and bowel sounds are normal.                                       There is no tenderness.                                       There is no rebound and no CVA tenderness.   Musculoskeletal:         Extremities atraumatic x 4.   Lymphadenopathy:     No cervical adenopathy.   Neurological:               Moves all extremities equally.  Points to lower back side of the pain.  Skin:                            Skin is intact. No rash noted.   Psychiatric:                  Normal mood and affect.    Medical Decision Making       78 MINUTES SPENT BY ME on the date of service doing chart review, history, exam, documentation & further activities per the note.      Data     I have personally reviewed the following data over the past 24 hrs:    15.6 (H)  \   15.2   / 320     137 98 23.3 (H) /  252 (H)   4.3 26 0.77 \     ALT: 14 AST: 23 AP: 101 TBILI: 0.5   ALB: 4.0 TOT PROTEIN: 7.3 LIPASE: 43     Trop: 15 (H) BNP: N/A     Procal: N/A CRP: N/A Lactic Acid: 0.9         Imaging results reviewed over the past 24 hrs:   Recent Results (from the past 24 hour(s))   CT  Head w/o Contrast    Narrative    EXAM: CT HEAD W/O CONTRAST  LOCATION: [facility]  DATE: 12/30/2023    INDICATION: fall, head injury. Pain.  COMPARISON: CT brain 10/27/2021  TECHNIQUE: Routine CT Head without IV contrast. Multiplanar reformats. Dose reduction techniques were used.    FINDINGS:  INTRACRANIAL CONTENTS: No intracranial hemorrhage, extraaxial collection, or mass effect.  No CT evidence of acute infarct. Mild presumed chronic small vessel ischemic changes. Mild generalized volume loss. No hydrocephalus. Skull base vascular   calcifications     VISUALIZED ORBITS/SINUSES/MASTOIDS: Prior bilateral cataract surgery. Visualized portions of the orbits are otherwise unremarkable. Acute or chronic sphenoid sinus inflammatory changes with chronic reactive osteitis and secretions. No bone erosion. The   other sinuses are unremarkable. No middle ear or mastoid effusion.    BONES/SOFT TISSUES: Left malar soft tissue swelling extending over the left side coma and left lateral orbit wall. No associated fracture. Negative for calvarial fracture. No evidence for deep orbital injury. Temporomandibular joints unremarkable.      Impression    IMPRESSION:  1.  Atrophy and chronic vascular changes. Negative for acute intracranial process    2.  Superficial soft tissue swelling.   CT Cervical Spine w/o Contrast    Narrative    EXAM: CT CERVICAL SPINE W/O CONTRAST  LOCATION: [facility]  DATE: 12/30/2023    INDICATION: fall, neck pain  COMPARISON: None.  TECHNIQUE: Routine CT Cervical Spine without IV contrast. Multiplanar reformats. Dose reduction techniques were used.    FINDINGS:  VERTEBRA: Normal vertebral body heights and alignment. No fracture or posttraumatic subluxation.     CANAL/FORAMINA:     C3-C4: Small central disc protrusion. Patent central canal and foramen.    C4-C5: Left facet arthropathy. Minimal uncinate spur. Patent central canal.    C5-C6: Disc  osteophyte. Patent central canal. Uncinate spur. Patent central canal.    C6-C7: Disc osteophyte. Patent central canal. Uncinate spur with mild left foraminal stenosis.    PARASPINAL: No extraspinal abnormality.      Impression    IMPRESSION:  1.  No fracture or posttraumatic subluxation.  2.  No high-grade spinal canal or neural foraminal stenosis. Cervical spondylosis.   CT Abdomen Pelvis w Contrast    Narrative    EXAM: CT ABDOMEN PELVIS W CONTRAST  LOCATION: [facility]  DATE: 12/30/2023    INDICATION: Abdominal pain. Fall. Back pain.  COMPARISON: CT abdomen pelvis 07/23/2018 reviewed.  TECHNIQUE: CT scan of the abdomen and pelvis was performed following injection of IV contrast. Multiplanar reformats were obtained. Dose reduction techniques were used.  CONTRAST: 90 mL Isovue-370    FINDINGS:   LOWER CHEST: Mild linear scarring or atelectasis in the lower lungs.    HEPATOBILIARY: Cholecystectomy. Mild biliary prominence from reservoir effect postcholecystectomy, unchanged. Normal liver.    PANCREAS: Normal.    SPLEEN: Normal.    ADRENAL GLANDS: Normal.    KIDNEYS/BLADDER: No urinary stone or hydronephrosis. Few small areas of heterogeneous low-attenuation in both kidneys, indeterminate. Small benign simple left renal cyst, no follow-up needed.    BOWEL: Normal.    LYMPH NODES: Normal.    VASCULATURE: Mild atherosclerotic calcifications. Normal caliber abdominal aorta.    PELVIC ORGANS: Hysterectomy. Pelvic phleboliths.    MUSCULOSKELETAL: Bony demineralization. Mild scattered degenerative changes in the spine. No acute osseous abnormality.      Impression    IMPRESSION:   1. No acute traumatic findings in the abdomen or pelvis.  2. Few areas of heterogeneous low-attenuation in both kidneys, indeterminate. Findings could be seen in the setting of pyelonephritis or sequelae of chronic scarring. Please correlate clinically for any signs and/or symptoms of acute infection.   CTA Head Neck  with Contrast    Narrative    EXAM: CTA HEAD NECK W CONTRAST  LOCATION: [facility]  DATE: 12/30/2023    INDICATION: fall, headache, n v  COMPARISON: None.  CONTRAST: 90 mL Isovue 370  TECHNIQUE: Head and neck CT angiogram with IV contrast. axial helical CT images of the head and neck vessels obtained during the arterial phase of intravenous contrast administration. Axial 2D reconstructed images and multiplanar 3D MIP reconstructed   images of the head and neck vessels were performed by the technologist. Dose reduction techniques were used. All stenosis measurements made according to NASCET criteria unless otherwise specified.    FINDINGS:     HEAD CTA:  ANTERIOR CIRCULATION: No stenosis/occlusion, aneurysm, or high flow vascular malformation. Standard [place] anatomy.    POSTERIOR CIRCULATION: No stenosis/occlusion, aneurysm, or high flow vascular malformation. Balanced vertebral arteries supply a normal basilar artery.     DURAL VENOUS SINUSES: Expected enhancement of the major dural venous sinuses.    NECK CTA:  RIGHT CAROTID: 35% stenosis at the ICA origin with heavily calcified plaque. No dissection.    LEFT CAROTID: 45% stenosis at the ICA origin. Heavily calcified plaque. No dissection.    VERTEBRAL ARTERIES: Mild stenosis of the left vertebral artery origin with calcified plaque. Remainder of this vessel unremarkable. Right vertebral artery within normal. Balanced vertebral arteries.    AORTIC ARCH: Classic aortic arch anatomy. 25% stenosis at the origin of the left subclavian artery with fibrofatty noncalcified plaque. The other aortic arch vessel origins are unremarkable.    NONVASCULAR STRUCTURES: Mild cervical spondylosis. Sphenoid sinus inflammatory changes with acute and chronic appearance.      Impression    IMPRESSION:     HEAD CTA:   1.  Normal CTA [place].    NECK CTA:  1.  Mild-to-moderate carotid bifurcation stenoses and mild stenosis at left vertebral  artery origin. Negative for dissection.

## 2023-12-31 NOTE — PHARMACY-ADMISSION MEDICATION HISTORY
Pharmacist Admission Medication History    Admission medication history is complete. The information provided in this note is only as accurate as the sources available at the time of the update.    Information Source(s): Patient, Clinic records, and CareEverywhere/SureScripts via in-person    Pertinent Information: Patient tells me she doesn't like taking metoprolol because she feels fatigued after taking.     Changes made to PTA medication list:  Added: escitalopram, glimepiride   Deleted: fenofibrate, rosuvastatin  Changed: metoprolol to ER    Allergies reviewed with patient and updates made in EHR: yes    Medication History Completed By: Lula Oscar RPH 12/30/2023 9:47 PM    PTA Med List   Medication Sig Last Dose    aspirin 81 MG EC tablet Take 81 mg by mouth daily 12/29/2023    calcium-magnesium (CALMAG) 500-250 MG TABS Take 1 tablet by mouth daily. 12/29/2023    escitalopram (LEXAPRO) 10 MG tablet Take 10 mg by mouth daily 12/29/2023    glimepiride (AMARYL) 1 MG tablet Take 1 mg by mouth every morning (before breakfast) 12/29/2023    metoprolol succinate ER (TOPROL XL) 25 MG 24 hr tablet Take 25 mg by mouth daily 12/29/2023    Multiple Vitamin (MULTI-VITAMIN) per tablet Take 1 tablet by mouth daily. 12/29/2023    Vitamin D3 (CHOLECALCIFEROL) 25 mcg (1000 units) tablet Take 1,000 Units by mouth daily 12/29/2023

## 2023-12-31 NOTE — PROGRESS NOTES
RECEIVING UNIT ED HANDOFF REVIEW    ED Nurse Handoff Report was reviewed by: Anushka Easton RN on December 30, 2023 at 9:52 PM

## 2023-12-31 NOTE — PROGRESS NOTES
PRIMARY DIAGNOSIS: SYNCOPE/TIA  OUTPATIENT/OBSERVATION GOALS TO BE MET BEFORE DISCHARGE:  1. Orthostatic performed: Yes:          Lying Orthostatic BP: 117/54         Sitting Orthostatic BP: 122/50         Standing Orthostatic BP: 111/49     2. Diagnostic testing complete & at baseline neurologic testing: Yes    3. Cleared by consultants (if involved): No    4. Interpretation of cardiac rhythm per telemetry tech: SR     5. Tolerating adequate PO diet and medications: Yes    6. Return to near baseline physical activity or neurologic status: No. PT recommending TCU.     Discharge Planner Nurse   Safe discharge environment identified: Yes  Barriers to discharge: Yes       Entered by: Ani Mueller RN 12/31/2023 4:13 PM     Please review provider order for any additional goals.   Nurse to notify provider when observation goals have been met and patient is ready for discharge.

## 2023-12-31 NOTE — PLAN OF CARE
PRIMARY Concern: Unwitnessed fall, head strike  SAFETY RISK Concerns (fall risk, behaviors, etc.): Fall risk      Isolation/Type: N/A  Tests/Procedures for NEXT shift: Echo, neuro checks, Orthostatics q8 hrs  Consults? (Pending/following, signed-off?) PT consulted  Where is patient from? (Home, TCU, etc.): Independent living.  Other Important info for NEXT shift: Q4 neuro checks intact.  Anticipated DC date & active delays: TBD    SUMMARY NOTE:  Orientation/Cognitive: A&Ox1-2 disoriented to place time and situation at times, forgetful.   Observation Goals (Met/ Not Met): Not Met  Mobility Level/Assist Equipment: Asst 1 gb/w  Antibiotics & Plan (IV/po, length of tx left): N/A  Pain Management: C/o mild headache, declined interventions.  Tele/VS/O2: BP elevated, HR tachy into 110's. Positive orthos,  lying and 55 standing with associated dizziness. Tele- ST.  ABNL Lab/BG: Trop 15 and 15; WBC 15.6;  and 229; A1c 8.6  Diet: Clears  Bowel/Bladder: Continent, up to BSC.  Skin Concerns: Hematoma above L eye with associated bruising, scattered bruises to hands.  Drains/Devices: PIV infusing NS at 100/hr.  Patient Stated Goal for Today: Sleep  Other: Neuros intact.  Currently holding metoprolol.           Observation goals  PRIOR TO DISCHARGE        Comments:   -diagnostic tests and consults completed and resulted- NOT MET  -vital signs normal or at patient baseline- NOT MET  Nurse to notify provider when observation goals have been met and patient is ready for discharge.

## 2024-01-01 LAB
CORTIS SERPL-MCNC: 17 UG/DL
GLUCOSE BLDC GLUCOMTR-MCNC: 167 MG/DL (ref 70–99)
GLUCOSE BLDC GLUCOMTR-MCNC: 169 MG/DL (ref 70–99)
GLUCOSE BLDC GLUCOMTR-MCNC: 179 MG/DL (ref 70–99)
GLUCOSE BLDC GLUCOMTR-MCNC: 200 MG/DL (ref 70–99)
GLUCOSE BLDC GLUCOMTR-MCNC: 223 MG/DL (ref 70–99)

## 2024-01-01 PROCEDURE — 258N000003 HC RX IP 258 OP 636: Performed by: HOSPITALIST

## 2024-01-01 PROCEDURE — 250N000013 HC RX MED GY IP 250 OP 250 PS 637: Performed by: INTERNAL MEDICINE

## 2024-01-01 PROCEDURE — G0378 HOSPITAL OBSERVATION PER HR: HCPCS

## 2024-01-01 PROCEDURE — 82962 GLUCOSE BLOOD TEST: CPT

## 2024-01-01 PROCEDURE — 250N000013 HC RX MED GY IP 250 OP 250 PS 637: Performed by: HOSPITALIST

## 2024-01-01 PROCEDURE — 36415 COLL VENOUS BLD VENIPUNCTURE: CPT | Performed by: INTERNAL MEDICINE

## 2024-01-01 PROCEDURE — 82533 TOTAL CORTISOL: CPT | Performed by: INTERNAL MEDICINE

## 2024-01-01 PROCEDURE — 99232 SBSQ HOSP IP/OBS MODERATE 35: CPT | Performed by: INTERNAL MEDICINE

## 2024-01-01 RX ADMIN — SODIUM CHLORIDE: 9 INJECTION, SOLUTION INTRAVENOUS at 01:52

## 2024-01-01 RX ADMIN — SODIUM CHLORIDE: 9 INJECTION, SOLUTION INTRAVENOUS at 07:26

## 2024-01-01 RX ADMIN — SODIUM CHLORIDE 500 ML: 9 INJECTION, SOLUTION INTRAVENOUS at 04:29

## 2024-01-01 RX ADMIN — SODIUM CHLORIDE: 9 INJECTION, SOLUTION INTRAVENOUS at 16:46

## 2024-01-01 RX ADMIN — ACETAMINOPHEN 650 MG: 325 TABLET, FILM COATED ORAL at 04:17

## 2024-01-01 RX ADMIN — ESCITALOPRAM OXALATE 10 MG: 10 TABLET ORAL at 07:26

## 2024-01-01 ASSESSMENT — ACTIVITIES OF DAILY LIVING (ADL)
ADLS_ACUITY_SCORE: 35
ADLS_ACUITY_SCORE: 34
ADLS_ACUITY_SCORE: 35

## 2024-01-01 NOTE — PLAN OF CARE
Goal Outcome Evaluation:           PRIMARY Concern: Pt here with syncope, fall at independent living place, laceration to the L upper eye. Pt has no recollection of how she fell or events prior to it.   SAFETY RISK Concerns (fall risk, behaviors, etc.): fall risk       Isolation/Type: none  Tests/Procedures for NEXT shift: labs in am , orthostatic vitals every shift. PT recommending TCU  Consults? (Pending/following, signed-off?) PT following recommending TCU. SW consulted   Where is patient from? (Home, TCU, etc.): Ind living place  Other Important info for NEXT shift: ortho negative. Ambulated X2 in the hallway. Forgetful   Anticipated DC date & active delays: tomorrow     SUMMARY NOTE:  Orientation/Cognitive: A/O X4. Forgetful.   Observation Goals (Met/ Not Met): not met   Mobility Level/Assist Equipment: up with SBA walker/GB  Antibiotics & Plan (IV/po, length of tx left): none  Pain Management: denies pain   Tele/VS/O2: ortho negative. RA SR  ABNL Lab/BG: BG checks ACHS. Tolerating diet. Good appetite   Diet: regular  Bowel/Bladder: continence  Skin Concerns: L eye lac, bruises,dry skin   Drains/Devices: PIV  Patient Stated Goal for Today: to get better

## 2024-01-01 NOTE — SIGNIFICANT EVENT
Significant Event Note    Time of event: 4:26 AM January 1, 2024    Description of event:  Orthostatics positive    Plan:  Bolus ordered    Discussed with: bedside nurse    Gutierrez Ann MD

## 2024-01-01 NOTE — PLAN OF CARE
Goal Outcome Evaluation:    Top portion must ALWAYS be completed  PRIMARY Concern: Fall/Syncope  SAFETY RISK Concerns (fall risk, behaviors, etc.): Fall risk      Isolation/Type: N/A  Tests/Procedures for NEXT shift: NA  Consults? (Pending/following, signed-off?) PT following, will need SW consult since PT is recommending TCU  Where is patient from? (Home, TCU, etc.): IND living, [facility]   Other Important info for NEXT shift: orthos q8h, neuros q4h  Anticipated DC date & active delays: 1-2 days  _____________________________________________________________________________  SUMMARY NOTE:  Orientation/Cognitive: A&Ox4 forgetful , Neuros intact  Observation Goals (Met/ Not Met): Not met  Mobility Level/Assist Equipment: Ax1, GB, walker  Antibiotics & Plan (IV/po, length of tx left): NA  Pain Management: 4/10 back pain PRN tylenol given  Tele/VS/O2: Tele: SR VSS orthos positive  ABNL Lab/BG: B   Diet: Regular  Bowel/Bladder: Continent  Skin Concerns: L eyebrow laceration and scattered bruising  Drains/Devices: PIV infusing NS@100ml/hr  Patient Stated Goal for Today: To rest  Observation goals  PRIOR TO DISCHARGE        Comments:   -diagnostic tests and consults completed and resulted: Partially Met  -vital signs normal or at patient baseline:Met  Nurse to notify provider when observation goals have been met and patient is ready for dischar

## 2024-01-01 NOTE — PROGRESS NOTES
PRIMARY DIAGNOSIS: SYNCOPEOUTPATIENT/OBSERVATION GOALS TO BE MET BEFORE DISCHARGE:  1. Orthostatic performed: Yes:          Lying Orthostatic BP: 145/61         Sitting Orthostatic BP: 131/70         Standing Orthostatic BP: 115/61     2. Diagnostic testing complete & at baseline neurologic testing: Yes    3. Cleared by consultants (if involved): No    4. Interpretation of cardiac rhythm per telemetry tech: SR    5. Tolerating adequate PO diet and medications: Yes    6. Return to near baseline physical activity or neurologic status: No    Discharge Planner Nurse   Safe discharge environment identified: Yes  Barriers to discharge: Yes       Entered by: Ani Mueller RN 01/01/2024 4:49 PM     Please review provider order for any additional goals.   Nurse to notify provider when observation goals have been met and patient is ready for discharge.

## 2024-01-01 NOTE — PROVIDER NOTIFICATION
MD Notification    Notified Person: MD    Notified Person Name:Marissa    Notification Date/Time:23 0424    Notification Interaction:[employer] messaging    Purpose of Notification:FYI pts orthos were positive laying 162/81 standin/53 1 minute after standing was 95/60. Pt does endorse feeling dizzy and lightheaded. Any orders?     Orders Received:500cc NS bolus    Comments:

## 2024-01-01 NOTE — PROGRESS NOTES
Observation goals  PRIOR TO DISCHARGE        Comments:   -diagnostic tests and consults completed and resulted: Partially Met  -vital signs normal or at patient baseline:Met  Nurse to notify provider when observation goals have been met and patient is ready for discharge.

## 2024-01-01 NOTE — PROGRESS NOTES
Observation goals  PRIOR TO DISCHARGE        Comments:   -diagnostic tests and consults completed and resulted: Partially Met  -vital signs normal or at patient baseline:Met  Nurse to notify provider when observation goals have been met and patient is ready for dischar

## 2024-01-01 NOTE — PROGRESS NOTES
PRIMARY DIAGNOSIS: SYNCOPEOUTPATIENT/OBSERVATION GOALS TO BE MET BEFORE DISCHARGE:  1. Orthostatic performed: Yes:          Lying Orthostatic BP: 145/61         Sitting Orthostatic BP: 131/70         Standing Orthostatic BP: 115/61     2. Diagnostic testing complete & at baseline neurologic testing: Yes    3. Cleared by consultants (if involved): No    4. Interpretation of cardiac rhythm per telemetry tech: SR    5. Tolerating adequate PO diet and medications: Yes    6. Return to near baseline physical activity or neurologic status: No    Discharge Planner Nurse   Safe discharge environment identified: Yes  Barriers to discharge: Yes       Entered by: Ani Mueller RN 01/01/2024 8:17 AM     Please review provider order for any additional goals.   Nurse to notify provider when observation goals have been met and patient is ready for discharge.

## 2024-01-01 NOTE — PROGRESS NOTES
PRIMARY DIAGNOSIS: SYNCOPEOUTPATIENT/OBSERVATION GOALS TO BE MET BEFORE DISCHARGE:  1. Orthostatic performed: Yes:          Lying Orthostatic BP: 145/61         Sitting Orthostatic BP: 131/70         Standing Orthostatic BP: 115/61     2. Diagnostic testing complete & at baseline neurologic testing: Yes    3. Cleared by consultants (if involved): No    4. Interpretation of cardiac rhythm per telemetry tech: SR    5. Tolerating adequate PO diet and medications: Yes    6. Return to near baseline physical activity or neurologic status: No    Discharge Planner Nurse   Safe discharge environment identified: Yes  Barriers to discharge: Yes       Entered by: Ani Mueller RN 01/01/2024 12:03 PM     Please review provider order for any additional goals.   Nurse to notify provider when observation goals have been met and patient is ready for discharge.

## 2024-01-01 NOTE — PROVIDER NOTIFICATION
MD Notification    Notified Person: MD    Notified Person Name:    Notification Date/Time:12/31/23 1928    Notification Interaction:[employer] messaging    Purpose of Notification:Pt is complaining of back pain, Pt does not have any PRNs ordered, can you order tylenol? Thank you.     Orders Received:Ordereed    Comments:

## 2024-01-01 NOTE — PROGRESS NOTES
[facility]    Medicine Progress Note - Hospitalist Service    Date of Admission:  12/30/2023    Assessment & Plan   Meredith Allen is a 83 year old female admitted on 12/30/2023.  She has a past medical history of chronic back pain, hyperlipidemia, hypertension, depression, diabetes.  She presented after an unwitnessed fall.     # Unwitnessed fall, rule out syncope  # Rule out pyelonephritis  # Troponin elevation, likely demand ischemia  -Presented with fall with vague history, unwitnessed  -EKG with sinus tachycardia no obvious ischemic change fair amount of baseline artifact  -She does have some abdominal pain and some back pain.  Back pain reportedly low back pain and chronic for her however she feels like it slightly worse.  -CT abdomen was done to rule out intra-abdominal issues and was notable for some heterogeneous low-attenuation in both kidneys which could be seen in the setting of pyelonephritis or sequelae of chronic scarring.  Patient's urinalysis however is noninfectious.  Her kidney function is also within normal limits.  - During hospital stay, she did have positive orthostatic vital signs.  This required intermittent IV fluids with adequate response.  - Seen by PT recommending discharge to transitional care facility.  -AM cortisol was within normal limits.  - Troponin peaked to 17 likely to signify demand stress on the heart.  -Echocardiogram obtained showed normal EF, there were no regional wall motion abnormality.  There were no hemodynamically significant valvular abnormalities.  -Telemetry was also reviewed,    Plan  - register to observation  - Orthostatic vital signs every shift  -- anticipate discharge with event monitor  - Pending discharge to TCU.  -- Enourage use of abdominal binder       # Soft tissue injury of the head  -Head ct and c spine ct were negative for acute pathology  -No other obvious signs of trauma  -Low back pain may be chronic per the  patient  Plan  - q 4 hour neuro checks        # Lower acute on chronic back pain  - Given her fall CT L-spine was obtained which showed no evidence of fracture or high-grade spinal canal or neuroforaminal stenosis     # Accelerated hypertension  Blood pressure initially 220 but this improved after IV Benadryl use for nausea  Plan  - As needed hydralazine for systolic blood pressures greater than 180  - Holding PTA metoprolol; BP has since normalized         # Type 2 diabetes  Plan  - Sliding scale insulin and diabetic diet          Observation Goals: -diagnostic tests and consults completed and resulted, -vital signs normal or at patient baseline, Nurse to notify provider when observation goals have been met and patient is ready for discharge.  Diet: Advance Diet as Tolerated: Regular Diet Adult    DVT Prophylaxis: Pneumatic Compression Devices  Saavedra Catheter: Not present  Lines: None     Cardiac Monitoring: ACTIVE order. Indication: Syncope- low cardiac risk (24 hours)  Code Status: Full Code      Clinically Significant Risk Factors Present on Admission                # Drug Induced Platelet Defect: home medication list includes an antiplatelet medication       # DMII: A1C = 8.6 % (Ref range: <5.7 %) within past 6 months               Disposition Plan  Placement to TCU, cleared for discharge      Expected Discharge Date: 01/02/2024        Discharge Comments: pyelonephritis, neg UA  echo done  PT TCU  Neuro checks  orthostatic BP's            FARZANA MCLEOD MD  Hospitalist Service  [facility]  Securely message with [employer] (more info)  Text page via [employer] Paging/Directory   ______________________________________________________________________    Interval History   Reports feeling good today; PT recommending TCU which patient is amenable to. Denies any dizziness. Having normal po intake. Telemetry shows NSR. Normal rate. Orthos looking better     Physical Exam   Vital Signs: Temp: 98  F  (36.7  C) Temp src: Oral BP: 139/61 Pulse: 77   Resp: 20 SpO2: 97 % O2 Device: None (Room air)    Weight: 166 lbs .1 oz      Medical Decision Making   General Appearance: Lying comfortably in bed, on room air, in no acute distress of discomfort  HEENT: PERRL: left periorbital echhymosis, EOMI; moist mucous membrane w/o lesions  Neck: No JVD  Pulmonary: Clear to auscultation bilaterally, no wheezes or crackles  CVS: Regular rhythm, systolic murmur, no rubs or gallops  GI: BS (+), soft nontender, no rebound or guarding   Extremities: No LE edema; pulses strong and equal throughout   Skin: No rashes or lesions  Neurologic: A&O x3      45 MINUTES SPENT BY ME on the date of service doing chart review, history, exam, documentation & further activities per the note.      Data   ------------------------- PAST 24 HR DATA REVIEWED -----------------------------------------------    I have personally reviewed the following data over the past 24 hrs:    Trop: 16 (H) BNP: N/A       Imaging results reviewed over the past 24 hrs:   Recent Results (from the past 24 hour(s))   Echocardiogram Complete   Result Value    LVEF  60-65%    Narrative    [id number]  [id number]  [id number]  [id number]^RACHAEL^OFE^AMAURI     [facility]  Echocardiography Laboratory  [address] [place] [address]     Name: SLIVA EVERETT  MRN: [id number]  : 1940  Study Date: 2023 03:04 PM  Age: 83 yrs  Gender: Female  Patient Location: [facility]  Reason For Study: Syncope and Collapse  Ordering Physician: OFE CANO  Referring Physician: Lia Saini  Performed By: Amaya Greco     BSA: 1.8 m2  Height: 65 in  Weight: 166 lb  HR: 85  BP: 175/75 mmHg  ______________________________________________________________________________  Procedure  Complete Portable Echo Adult. [employer] (NDC #[id number]) given  intravenously.  ______________________________________________________________________________  Interpretation Summary     Left ventricular systolic function is normal.  The visual ejection fraction is 60-65%.  No regional wall motion abnormalities noted.  Technically difficult, suboptimal study. No hemodynamically significant  valvular abnormalities on 2D or color flow imaging. There is no comparison  study available.  ______________________________________________________________________________  Left Ventricle  The left ventricle is normal in size. Grade I or early diastolic dysfunction.  Left ventricular systolic function is normal. The visual ejection fraction is  60-65%. No regional wall motion abnormalities noted.     Right Ventricle  The right ventricle is not well visualized. The right ventricular systolic  function is normal.     Atria  The left atrium is not well visualized. Normal left atrial size. Right atrium  not well visualized.     Mitral Valve  The mitral valve is not well visualized. There is trace mitral regurgitation.     Tricuspid Valve  The tricuspid valve is not well visualized. Right ventricular systolic  pressure could not be approximated due to inadequate tricuspid regurgitation.     Aortic Valve  The aortic valve is not well visualized. No hemodynamically significant  valvular aortic stenosis.     Pulmonic Valve  The pulmonic valve is not well visualized. Normal pulmonic valve velocity.     Vessels  Normal size aorta. Inferior vena cava not well visualized for estimation of  right atrial pressure. The inferior vena cava is normal.     Pericardium  The pericardium is not well visualized.     ______________________________________________________________________________  MMode/2D Measurements & Calculations  IVSd: 0.94 cm  LVIDd: 4.4 cm  LVIDs: 3.0 cm  LVPWd: 1.0 cm  FS: 33.0 %  LV mass(C)d: 143.3 grams  LV mass(C)dI: 78.4 grams/m2     Ao root diam: 2.7 cm  LA dimension: 3.2 cm  asc Aorta  Diam: 2.8 cm  LA/Ao: 1.2  LVOT diam: 2.0 cm  LVOT area: 3.1 cm2  Ao root diam index Ht(cm/m): 1.6  Ao root diam index BSA (cm/m2): 1.5  Asc Ao diam index BSA (cm/m2): 1.5  Asc Ao diam index Ht(cm/m): 1.7  RWT: 0.46  TAPSE: 2.0 cm     Doppler Measurements & Calculations  MV E max davin: 81.2 cm/sec  MV A max davin: 118.0 cm/sec  MV E/A: 0.69  MV dec time: 0.20 sec  Ao V2 max: 171.0 cm/sec  Ao max P.0 mmHg  Ao V2 mean: 119.0 cm/sec  Ao mean P.0 mmHg  Ao V2 VTI: 31.6 cm  DIAMOND(I,D): 2.0 cm2  DIAMOND(V,D): 1.6 cm2  LV V1 max P.9 mmHg  LV V1 max: 85.4 cm/sec  LV V1 VTI: 20.1 cm  SV(LVOT): 63.1 ml  SI(LVOT): 34.6 ml/m2     PA acc time: 0.14 sec  AV Davin Ratio (DI): 0.50  DIAMOND Index (cm2/m2): 1.1  E/E' av.3  Lateral E/e': 10.1  Medial E/e': 8.5  RV S Davin: 11.0 cm/sec     ______________________________________________________________________________  Report approved by: Angelique Love 2023 06:26 PM

## 2024-01-02 ENCOUNTER — APPOINTMENT (OUTPATIENT)
Dept: PHYSICAL THERAPY | Facility: CLINIC | Age: 84
End: 2024-01-02
Payer: COMMERCIAL

## 2024-01-02 ENCOUNTER — APPOINTMENT (OUTPATIENT)
Dept: CARDIOLOGY | Facility: CLINIC | Age: 84
End: 2024-01-02
Attending: PHYSICIAN ASSISTANT
Payer: COMMERCIAL

## 2024-01-02 ENCOUNTER — LAB REQUISITION (OUTPATIENT)
Dept: LAB | Facility: CLINIC | Age: 84
End: 2024-01-02
Payer: COMMERCIAL

## 2024-01-02 VITALS
DIASTOLIC BLOOD PRESSURE: 83 MMHG | HEART RATE: 74 BPM | SYSTOLIC BLOOD PRESSURE: 147 MMHG | OXYGEN SATURATION: 97 % | WEIGHT: 167.3 LBS | RESPIRATION RATE: 18 BRPM | TEMPERATURE: 97.4 F

## 2024-01-02 DIAGNOSIS — Z11.1 ENCOUNTER FOR SCREENING FOR RESPIRATORY TUBERCULOSIS: ICD-10-CM

## 2024-01-02 DIAGNOSIS — U07.1 COVID-19: ICD-10-CM

## 2024-01-02 LAB
GLUCOSE BLDC GLUCOMTR-MCNC: 162 MG/DL (ref 70–99)
GLUCOSE BLDC GLUCOMTR-MCNC: 177 MG/DL (ref 70–99)

## 2024-01-02 PROCEDURE — 82962 GLUCOSE BLOOD TEST: CPT

## 2024-01-02 PROCEDURE — 99239 HOSP IP/OBS DSCHRG MGMT >30: CPT | Performed by: PHYSICIAN ASSISTANT

## 2024-01-02 PROCEDURE — 93242 EXT ECG>48HR<7D RECORDING: CPT

## 2024-01-02 PROCEDURE — 250N000013 HC RX MED GY IP 250 OP 250 PS 637: Performed by: INTERNAL MEDICINE

## 2024-01-02 PROCEDURE — 93248 EXT ECG>7D<15D REV&INTERPJ: CPT | Performed by: INTERNAL MEDICINE

## 2024-01-02 PROCEDURE — G0378 HOSPITAL OBSERVATION PER HR: HCPCS

## 2024-01-02 PROCEDURE — 250N000013 HC RX MED GY IP 250 OP 250 PS 637: Performed by: PHYSICIAN ASSISTANT

## 2024-01-02 PROCEDURE — 258N000003 HC RX IP 258 OP 636: Performed by: HOSPITALIST

## 2024-01-02 PROCEDURE — 97112 NEUROMUSCULAR REEDUCATION: CPT | Mod: GP

## 2024-01-02 PROCEDURE — 87635 SARS-COV-2 COVID-19 AMP PRB: CPT | Performed by: NURSE PRACTITIONER

## 2024-01-02 PROCEDURE — 97750 PHYSICAL PERFORMANCE TEST: CPT | Mod: GP

## 2024-01-02 PROCEDURE — 250N000013 HC RX MED GY IP 250 OP 250 PS 637: Performed by: HOSPITALIST

## 2024-01-02 RX ORDER — ACETAMINOPHEN 325 MG/1
650 TABLET ORAL EVERY 4 HOURS PRN
DISCHARGE
Start: 2024-01-02 | End: 2024-01-03

## 2024-01-02 RX ADMIN — ACETAMINOPHEN 650 MG: 325 TABLET, FILM COATED ORAL at 00:05

## 2024-01-02 RX ADMIN — SODIUM CHLORIDE: 9 INJECTION, SOLUTION INTRAVENOUS at 00:05

## 2024-01-02 RX ADMIN — METOPROLOL SUCCINATE 25 MG: 25 TABLET, EXTENDED RELEASE ORAL at 10:10

## 2024-01-02 RX ADMIN — ESCITALOPRAM OXALATE 10 MG: 10 TABLET ORAL at 08:07

## 2024-01-02 ASSESSMENT — ACTIVITIES OF DAILY LIVING (ADL)
ADLS_ACUITY_SCORE: 34
DEPENDENT_IADLS:: INDEPENDENT
ADLS_ACUITY_SCORE: 34

## 2024-01-02 NOTE — PROGRESS NOTES
[facility] GERIATRICS    PRIMARY CARE PROVIDER AND CLINIC:  Lia Saini MD, [id number] [facility] [address]  Chief Complaint   Patient presents with    Hospital F/U      [place] Medical Record Number:  [id number]  Place of Service where encounter took place:  [facility]  ([facility]) [[id number]]    Meredith Allen  is a 83 year old  (1940), admitted to the above facility from  [facility]. Hospital stay 12/30/23 through 1/2/24..   HPI:    PMH significant for HTN, dyslipidemia, tachycardia, BPPV, type 2 diabetes, migraine, depression, IDA.    Summary of recent hospitalization:  Patient was hospitalized at [facility] from 12/30/2024 to 1/2/2024 for fall.  Patient presented to the emergency department for evaluation after an unwitnessed fall, patient does not recall falling or loss of consciousness, not aware how long she was on the floor, but presented with hematoma above left eye and pain in the back of her head.  Patient also with emesis and required multiple doses of antiemetics to control the vomiting.  Laboratory evaluation significant for high-sensitivity troponin 15, WBC 15.6, glucose 252.  UA with glucose 300, ketones 20, otherwise negative for infection, blood culture x 2 negative.  CTA head and neck shows normal CTA [place], mild to moderate carotid bifurcation stenosis and mild stenosis as left vertebral artery origin, negative for dissection.  CT cervical spine negative for acute fracture or posttraumatic subluxation.  Head CT negative for acute intracranial process, superficial soft tissue swelling.  CT lumbar spine negative for fracture or posttraumatic subluxation, no high-grade spinal canal or neural foraminal stenosis.  CT abdomen and pelvis shows no acute traumatic findings in abdomen or pelvis, few areas of heterogeneous low-attenuation in both kidneys indeterminant.  Echo from 12/31/2023 shows LVEF 60 to 65%, no regional  wall motion abnormalities, grade 1 diastolic dysfunction.  Required intermittent IV fluids for positive orthostatic vital signs.  PTA Benadryl at bedtime changed to melatonin. Discharged to TCU for physical rehabilitation and medical management.     Reviewed notes, imaging, labs from recent hospitalization.    Today patient was seen for admission visit in the TCU. She is sitting up in a recliner. She is oriented x3, Lives at the [facility]. She reports headache and back pain at time of visit, denies radiation of pain -rates 4/10- tylenol effective for headache and pain usually.  She denies any shortness of breath, chest pain, dysuria/trouble urinating.  She reports dizziness/lightheadedness with positional changes at times.  She tells me that her bowels are moving regularly.  She tells me that she lost her spouse this past summer, has had trouble sleeping since then.  She was previously using Benadryl, we discussed the change to melatonin and she would like this scheduled.  We discussed what to expect in the TCU.    Reviewed facility EMR including medications, recent nursing progress notes, vital signs.  Discussed patient with nursing staff today.    CODE STATUS/ADVANCE DIRECTIVES DISCUSSION:  CPR/Full code   ALLERGIES:   Allergies   Allergen Reactions    Sulfa Antibiotics       PAST MEDICAL HISTORY:   Past Medical History:   Diagnosis Date    Back pain     Hyperlipidemia     Migraines       PAST SURGICAL HISTORY:   has a past surgical history that includes Hysterectomy; Cholecystectomy; colonoscopy; Cystectomy ovarian benign; Hysterectomy total abdominal, repair bladder, combined; and Phacoemulsification clear cornea with standard intraocular lens implant (2/15/2012).  FAMILY HISTORY: family history is not on file.  SOCIAL HISTORY:   reports that she has never smoked. She does not have any smokeless tobacco history on file. She reports that she does not drink alcohol.  Patient's living condition: lives  "alone    Post Discharge Medication Reconciliation Status:   MED REC REQUIRED  Post Medication Reconciliation Status:  Discharge medications reconciled and changed, see notes/orders       Current Outpatient Medications   Medication Sig    acetaminophen (TYLENOL) 325 MG tablet Take 2 tablets (650 mg) by mouth at bedtime And TID PRN    aspirin 81 MG EC tablet Take 81 mg by mouth daily    calcium-magnesium (CALMAG) 500-250 MG TABS Take 1 tablet by mouth daily.    escitalopram (LEXAPRO) 10 MG tablet Take 10 mg by mouth daily    glimepiride (AMARYL) 1 MG tablet Take 1 mg by mouth every morning (before breakfast)    Melatonin 10 MG SUBL Place 5 mg under the tongue at bedtime    metoprolol succinate ER (TOPROL XL) 25 MG 24 hr tablet Take 25 mg by mouth daily    Multiple Vitamin (MULTI-VITAMIN) per tablet Take 1 tablet by mouth daily.    Vitamin D3 (CHOLECALCIFEROL) 25 mcg (1000 units) tablet Take 1,000 Units by mouth daily     No current facility-administered medications for this visit.       ROS:  10 point ROS of systems including Constitutional, Eyes, Respiratory, Cardiovascular, Gastroenterology, Genitourinary, Integumentary, Musculoskeletal, Psychiatric were all negative except for pertinent positives noted in my HPI.    Vitals:  BP (!) 178/79   Pulse 73   Temp 98.4  F (36.9  C)   Resp 18   Ht 1.651 m (5' 5\")   Wt 75.8 kg (167 lb)   SpO2 96%   BMI 27.79 kg/m    Exam:  GENERAL APPEARANCE:  Alert, in NAD  HEENT: normocephalic, moist mucous membranes, nose without drainage or crusting  RESP:  respiratory effort normal, no respiratory distress, Lung sounds clear, patient is on RA  CV: auscultation of heart done, rate and rhythm regular.  ABDOMEN: + bowel sounds, soft, nontender, no grimacing or guarding with palpation.  M/S: no lower extremity edema  SKIN:  Inspection and palpation of skin and subcutaneous tissue: skin warm, dry without rashes, bruising to left lateral eye area  NEURO: cranial nerves 2-12 grossly " intact and at patient's baseline; moves extremities freely  PSYCH: oriented x 3, affect and mood normal      Lab/Diagnostic data:  Labs done in SNF are in [facility]. Please refer to them using EPIC/Care Everywhere. and Recent labs in EPIC reviewed by me today.     ASSESSMENT/PLAN:  Fall, suspected syncope  Soft tissue injury with ecchymosis, left Maller region extending over left lateral orbit wall  Headache with history of migraine headache  Acute on chronic low back pain  CT head, cervical spine and lumbar spine negative for acute fracture or findings  With occasional dizziness with positional changes, with headache and back pain today, no vision changes or radiation of pain  Plan: Change tylenol to 650 mg at bedtime scheduled and TID PRN for pain.  Monitor headache and back pain.    Orthostatic hypotension  Essential hypertension  History of Tachycardia  Required IV hydration inpatient   With occasional dizziness/lightheadedness with positional changes  -150 and one /90, HR 73-80  Plan: Continue metoprolol ER 25 mg daily. Encourage hydration. Monitor heart rate and blood pressure. SBP goal <150 due to orthostasis. Abdominal binder on in AM, off in PM. Consider adding compression stockings if ongoing symptoms.    Mild to moderate carotid bifurcation stenosis per CTA neck on 12/30/2023  Mild stenosis at left vertebral artery origin per CTA neck on 12/30/2023  Dyslipidemia  Plan: Continue aspirin 81 mg daily. Not currently on statin- per care everywhere looks like she has an allergy to it. Follow up with PCP to discuss medical management.    Type 2 diabetes  Hemoglobin A1c 8.6% on 12/30/2023  No blood sugars available since admission, BS elevated inpatient  Plan: Continue glimepiride 1 mg daily.  Monitor blood sugars 3 times daily.  Start carb controlled diet.    Depression  Insomnia  Grief- patient lost spouse in 7/2023  PTA benadryl changed to melatonin inpatient  Plan: Continue escitalopram 10 mg  daily. Change melatonin to 5 mg scheduled at bedtime.  Monitor mood and symptoms.  Monitor sleep.  Consider ACP referral as needed.    IDA  Not currently using CPAP, tells me she is currently in the process of working with sleep clinic to have this reevaluated   Plan: Monitor oxygen saturations PRN while sleeping. Follow up with sleep clinic outpatient.     Physical deconditioning  Secondary to fall, recent hospitalization, medical conditions as above  Plan: Encourage participation in physical therapy/occupational therapy for strengthening and deconditioning. Discharge planning per their recommendation. Social work to assist with d/c planning.        Disclaimer: This note may contain text created using speech-recognition software and may contain unintended word substitutions.       Total time spent with patient visit at the skilled nursing facility was 46 minutes including patient visit, review of past records, and discussion with nursing staff.     Electronically signed by:  KATIE Chinchilla CNP

## 2024-01-02 NOTE — PROGRESS NOTES
Observation goals  PRIOR TO DISCHARGE        Comments:   -diagnostic tests and consults completed and resulted: Partially met  -vital signs normal or at patient baseline: Not Met, +ve Orthos  Nurse to notify provider when observation goals have been met and patient is ready for dischar

## 2024-01-02 NOTE — PLAN OF CARE
PRIMARY Concern: Fall  SAFETY RISK Concerns (fall risk, behaviors, etc.): fall risk       Isolation/Type: none  Tests/Procedures for NEXT shift:   Consults? (Pending/following, signed-off?) SW pending   Where is patient from? (Home, TCU, etc.): Ind living facility   Other Important info for NEXT shift: Neuro & Ortho BP q8  Anticipated DC date & active delays: TBD     SUMMARY NOTE:  Orientation/Cognitive: A/O x4, forgetful    Observation Goals (Met/ Not Met): not met   Mobility Level/Assist Equipment: SBA/GB/W  Antibiotics & Plan (IV/po, length of tx left): none  Pain Management: denies pain   Tele/VS/O2: VSS on RA, ex HTN. Tele: SR  ABNL Lab/BG: See chart  Diet: regular  Bowel/Bladder: continence  Skin Concerns:  L eyebrow laceration and scattered bruising   Drains/Devices: NS @ 100/hr  Patient Stated Goal for Today: Rest    Observation goals  PRIOR TO DISCHARGE        Comments:   -diagnostic tests and consults completed and resulted: Partially met  -vital signs normal or at patient baseline: Not Met, +ve Orthos  Nurse to notify provider when observation goals have been met and patient is ready for dischar

## 2024-01-02 NOTE — DISCHARGE SUMMARY
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  The following labs/tests are recommended:   cardiac monitor.          Unresulted Labs Ordered in the Past 30 Days of this Admission       Date and Time Order Name Status Description    12/30/2023  7:34 PM Blood Culture Peripheral Blood Preliminary     12/30/2023  7:34 PM Blood Culture Line, venous Preliminary         These results will be followed up by PCP    Discharge Disposition   Discharged to short-term care facility  Condition at discharge: Stable    Hospital Course   Meredith Allen is a 83 year old female with below PMHx who was admitted on 12/30/2023.  She presented after sustaining an unwitnessed fall in her apartment, described syncope. Registered to observation for further evaluation and treatment with above work-up. Ultimately discharged to TCU.      Unwitnessed fall, described syncope   Orthostatic hypotension  Elevated troponin, flat  Soft tissue injury with ecchymosis, L malar region extending over L lateral oribit wall: Recent trip to  [place] over the holidays to visit family, while there she unfortunately came down with the stomach flu. Returned home via airplane 12/26. Reports some worsening back pain since trip from sleeping in a recliner at her daughter's house. On 12/30, pt woke up in the AM, but does not recall the events of the early morning, had left her apartment for some reason and upon return she had a described syncopal event. Unwitnessed. LOC for unclear period of time. No prodromal chest pain, dizziness, palpitations. No recent syncope. When she came to, she had injured her back further, thus could not get up. Called for help and a neighbor and firefighters were able to get her to the ED.   *Slightly tachycardic to the 120s with /94 on arrival, since resolved   *CBC with leukocytosis 15.6 on admission which resolved to 10.9 on repeat. CMP unremarkable. Lacit 0.9, Lipase WNL. CK 59. Trop 15>15>17>16. UA unremarkable. BG NGTD. AM cortisol WNL.   *EKG with sinus tachy (102 BPM), no evidence of acute ischemia   *CT head with superficial soft tissue swelling, otherwise unremarkable. CTA head and neck, CT cervical and lumbar spine negative for acute pathology. CT A/P notes a few areas of heterogeneous low-attenuation in both kidneys, indeterminate. Findings could be seen in the setting of pyelonephritis or sequelae of chronic scarring. Please correlate clinically for any signs and/or symptoms of acute infection. Suspect this represents chronic scarring as UA unremarkable and pt without urinary sx and with improvement in back pain throughout hospitalization. Afebrile.   *Echo with preserved EF, no RWMA, no significant valve disease. Grade 1 or early diastolic dysfunction.   - Orthostatics positive on admission for which patient received IVF hydration. Suspect dehydration with orthostatic hypotension in the setting of baseline poor oral fluid intake compounded by recent gastroenteritis. Pt also takes tylenol PM every evening which could  have played a part.   - Telemetry throughout stay without overt arrhythmia, will discharge with 7 day cardiac monitor (ziopatch)  - Adequate oral fluid intake encouraged, pt poor water drinker at baseline compounded with recent gastroenteritis   - Discussed stopping Tylenol PM as this is contributing to morning somnolence and increasing fall risk for patient. Will utilize melatonin 5 mg at bedtime PRN  - SW helping to facilitate discharge to TCU, referrals sent and pt accepted to [facility]  - Daughter, Nithya, updated 1/2 on plan of care as above  - Follow up with TCU provider in the next week      Acute on chronic low back pain, improved: Worsening back pain over the past week from sleeping in a recliner while staying with her daughter in [place] after coming down with gastroenteritis. Visited with chiropractor for a few sessions. Back pain further worsened subsequent to fall above. CT imaging negative for acute pathology as above. Abdominal binder was provided to patient which has provided relief. No saddle anesthesia, worsening radicular sx (baseline thigh involvement), difficulty with urination or BMs.   - Continue abdominal binder   - PRN tylenol   - Ice/heat   - PT and OT at TCU      Accelerated hypertension, improved: Blood pressure initially 220 but this improved after IV Benadryl use for nausea. PTA metoprolol had been held during hospital stay with SBPs in the 140s in the setting of orthostatic hypotension.   - Resumed metoprolol 1/2/23  - Monitor BPs at TCU     T2DM, non-insulin dependent, uncontrolled:           Hemoglobin A1C   Date Value Ref Range Status   12/30/2023 8.6 (H) <5.7 % Final       Comment:       Normal <5.7%   Prediabetes 5.7-6.4%    Diabetes 6.5% or higher     Note: Adopted from ADA consensus guidelines.   [Amaryl 1 mg po qd]  - Treated with sliding scale insulin during hospitalization, can resume Amaryl at discharge, but recommend uptitration to ensure better diabetes control by TCU  "team and PCP outpatient.               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 Asymptomatic.   - Continue PTA ASA 81 mg po every day at discharge   - Lipid profile outpatient and risk factor optimization per PCP      A few areas of heterogeneous low-attenuation in both kidneys, indeterminate: Noted on CT. Per radiology read, \"findings could be seen in the setting of pyelonephritis or sequelae of chronic scarring. Please correlate clinically for any signs and/or symptoms of acute infection.\"   *Suspect this represents chronic scarring as UA unremarkable and pt without urinary sx and with improvement in back pain throughout hospitalization. Afebrile.   - No indication for antibiotics     Insomnia: Use Melatonin 5 mg daily PRn in place of tylenol PM     Consultations This Hospital Stay   PHYSICAL THERAPY ADULT IP CONSULT  PHYSICAL THERAPY ADULT IP CONSULT  CARE MANAGEMENT / SOCIAL WORK IP CONSULT  PHYSICAL THERAPY ADULT IP CONSULT  OCCUPATIONAL THERAPY ADULT IP CONSULT    Code Status   Full Code    Time Spent on this Encounter   I, Kiarra Mike PA-C, personally saw the patient today and spent greater than 30 minutes discharging this patient.       Case discussed with Dr. GUERITA Eugene who agrees with the plan.     Kiarra Mike PA-C  [facility] EXTENDED RECOVERY AND SHORT STAY  [address] [place] [id number]  Phone: [phone]  ______________________________________________________________________    Physical Exam   Vital Signs: Temp: 97.4  F (36.3  C) Temp src: Oral BP: (!) 147/83 Pulse: 74   Resp: 18 SpO2: 97 % O2 Device: None (Room air)    Weight: 167 lbs 4.8 oz    See physical exam from progress note from earlier today. "     Primary Care Physician   Lia Saini    Discharge Orders      General info for SNF    Length of Stay Estimate: Short Term Care: Estimated # of Days <30  Condition at Discharge: Stable  Level of care:skilled   Rehabilitation Potential: Good  Admission H&P remains valid and up-to-date: Yes  Recent Chemotherapy: N/A  Use Nursing Home Standing Orders: Yes     Mantoux instructions    Give two-step Mantoux (PPD) Per Facility Policy Yes     Follow Up and recommended labs and tests    Follow up with TCU provider.  The following labs/tests are recommended: cardiac monitor.     Reason for your hospital stay    You were hospitalized for further evaluation and treatment of a fall/syncopal episode. You underwent extensive work-up. You were noted to have dehydration with orthostatic hypotension, also suspect tylenol PM use was playing a role.     Intake and output    Every shift     Daily weights    Call Provider for weight gain of more than 2 pounds per day or 5 pounds per week.     Activity - Up with nursing assistance     Encourage PO fluids     Physical Therapy Adult Consult    Evaluate and treat as clinically indicated.    Reason:  physical deconditioning, fall, syncope     Occupational Therapy Adult Consult    Evaluate and treat as clinically indicated.    Reason:  physical deconditioning, fall, syncope     Fall precautions     Diet    Follow this diet upon discharge: Orders Placed This Encounter      Advance Diet as Tolerated: Regular Diet Adult       Significant Results and Procedures   Most Recent 3 CBC's:  Recent Labs   Lab Test 12/31/23  0652 12/30/23  1734   WBC 10.9 15.6*   HGB 12.7 15.2   MCV 90 91    320     Most Recent 3 BMP's:  Recent Labs   Lab Test 01/02/24  0751 01/02/24  0134 01/01/24  2101 12/31/23  0732 12/31/23  0652 12/30/23  2258 12/30/23  1734   NA  --   --   --   --  137  --  137   POTASSIUM  --   --   --   --  3.5  --  4.3   CHLORIDE  --   --   --   --  99  --  98   CO2  --   --   --    --  29  --  26   BUN  --   --   --   --  19.1  --  23.3*   CR  --   --   --   --  0.80  --  0.77   ANIONGAP  --   --   --   --  9  --  13   MILAGROS  --   --   --   --  9.2  --  9.5   * 162* 179*   < > 158*   < > 252*    < > = values in this interval not displayed.     Most Recent 2 LFT's:  Recent Labs   Lab Test 12/31/23  0652 12/30/23  1734   AST 17 23   ALT 11 14   ALKPHOS 83 101   BILITOTAL 0.6 0.5     Most Recent 3 Troponin's:No lab results found.  7-Day Micro Results       Collected Updated Procedure Result Status      12/30/2023 2008 01/02/2024 0317 Blood Culture Peripheral Blood [[id number]]   Peripheral Blood    Preliminary result Component Value   Culture No growth after 2 days  [P]                12/30/2023 1942 01/02/2024 0317 Blood Culture Line, venous [[id number]]   Blood from Line, venous    Preliminary result Component Value   Culture No growth after 2 days  [P]                      Most Recent Urinalysis:  Recent Labs   Lab Test 12/30/23 1943   COLOR Light Yellow   APPEARANCE Clear   URINEGLC 300*   URINEBILI Negative   URINEKETONE 20*   SG 1.023   UBLD Negative   URINEPH 6.0   PROTEIN Negative   NITRITE Negative   LEUKEST Negative   RBCU <1   WBCU 1     Most Recent CPK:  Recent Labs   Lab Test 12/30/23 1734   CKT 59   ,   Results for orders placed or performed during the hospital encounter of 12/30/23   CT Head w/o Contrast    Narrative    EXAM: CT HEAD W/O CONTRAST  LOCATION: [facility]  DATE: 12/30/2023    INDICATION: fall, head injury. Pain.  COMPARISON: CT brain 10/27/2021  TECHNIQUE: Routine CT Head without IV contrast. Multiplanar reformats. Dose reduction techniques were used.    FINDINGS:  INTRACRANIAL CONTENTS: No intracranial hemorrhage, extraaxial collection, or mass effect.  No CT evidence of acute infarct. Mild presumed chronic small vessel ischemic changes. Mild generalized volume loss. No hydrocephalus. Skull base vascular   calcifications      VISUALIZED ORBITS/SINUSES/MASTOIDS: Prior bilateral cataract surgery. Visualized portions of the orbits are otherwise unremarkable. Acute or chronic sphenoid sinus inflammatory changes with chronic reactive osteitis and secretions. No bone erosion. The   other sinuses are unremarkable. No middle ear or mastoid effusion.    BONES/SOFT TISSUES: Left malar soft tissue swelling extending over the left side coma and left lateral orbit wall. No associated fracture. Negative for calvarial fracture. No evidence for deep orbital injury. Temporomandibular joints unremarkable.      Impression    IMPRESSION:  1.  Atrophy and chronic vascular changes. Negative for acute intracranial process    2.  Superficial soft tissue swelling.   CT Cervical Spine w/o Contrast    Narrative    EXAM: CT CERVICAL SPINE W/O CONTRAST  LOCATION: [facility]  DATE: 12/30/2023    INDICATION: fall, neck pain  COMPARISON: None.  TECHNIQUE: Routine CT Cervical Spine without IV contrast. Multiplanar reformats. Dose reduction techniques were used.    FINDINGS:  VERTEBRA: Normal vertebral body heights and alignment. No fracture or posttraumatic subluxation.     CANAL/FORAMINA:     C3-C4: Small central disc protrusion. Patent central canal and foramen.    C4-C5: Left facet arthropathy. Minimal uncinate spur. Patent central canal.    C5-C6: Disc osteophyte. Patent central canal. Uncinate spur. Patent central canal.    C6-C7: Disc osteophyte. Patent central canal. Uncinate spur with mild left foraminal stenosis.    PARASPINAL: No extraspinal abnormality.      Impression    IMPRESSION:  1.  No fracture or posttraumatic subluxation.  2.  No high-grade spinal canal or neural foraminal stenosis. Cervical spondylosis.   CT Abdomen Pelvis w Contrast    Narrative    EXAM: CT ABDOMEN PELVIS W CONTRAST  LOCATION: [facility]  DATE: 12/30/2023    INDICATION: Abdominal pain. Fall. Back pain.  COMPARISON: CT abdomen  pelvis 07/23/2018 reviewed.  TECHNIQUE: CT scan of the abdomen and pelvis was performed following injection of IV contrast. Multiplanar reformats were obtained. Dose reduction techniques were used.  CONTRAST: 90 mL Isovue-370    FINDINGS:   LOWER CHEST: Mild linear scarring or atelectasis in the lower lungs.    HEPATOBILIARY: Cholecystectomy. Mild biliary prominence from reservoir effect postcholecystectomy, unchanged. Normal liver.    PANCREAS: Normal.    SPLEEN: Normal.    ADRENAL GLANDS: Normal.    KIDNEYS/BLADDER: No urinary stone or hydronephrosis. Few small areas of heterogeneous low-attenuation in both kidneys, indeterminate. Small benign simple left renal cyst, no follow-up needed.    BOWEL: Normal.    LYMPH NODES: Normal.    VASCULATURE: Mild atherosclerotic calcifications. Normal caliber abdominal aorta.    PELVIC ORGANS: Hysterectomy. Pelvic phleboliths.    MUSCULOSKELETAL: Bony demineralization. Mild scattered degenerative changes in the spine. No acute osseous abnormality.      Impression    IMPRESSION:   1. No acute traumatic findings in the abdomen or pelvis.  2. Few areas of heterogeneous low-attenuation in both kidneys, indeterminate. Findings could be seen in the setting of pyelonephritis or sequelae of chronic scarring. Please correlate clinically for any signs and/or symptoms of acute infection.   CTA Head Neck with Contrast    Narrative    EXAM: CTA HEAD NECK W CONTRAST  LOCATION: [facility]  DATE: 12/30/2023    INDICATION: fall, headache, n v  COMPARISON: None.  CONTRAST: 90 mL Isovue 370  TECHNIQUE: Head and neck CT angiogram with IV contrast. axial helical CT images of the head and neck vessels obtained during the arterial phase of intravenous contrast administration. Axial 2D reconstructed images and multiplanar 3D MIP reconstructed   images of the head and neck vessels were performed by the technologist. Dose reduction techniques were used. All stenosis  measurements made according to NASCET criteria unless otherwise specified.    FINDINGS:     HEAD CTA:  ANTERIOR CIRCULATION: No stenosis/occlusion, aneurysm, or high flow vascular malformation. Standard [place] anatomy.    POSTERIOR CIRCULATION: No stenosis/occlusion, aneurysm, or high flow vascular malformation. Balanced vertebral arteries supply a normal basilar artery.     DURAL VENOUS SINUSES: Expected enhancement of the major dural venous sinuses.    NECK CTA:  RIGHT CAROTID: 35% stenosis at the ICA origin with heavily calcified plaque. No dissection.    LEFT CAROTID: 45% stenosis at the ICA origin. Heavily calcified plaque. No dissection.    VERTEBRAL ARTERIES: Mild stenosis of the left vertebral artery origin with calcified plaque. Remainder of this vessel unremarkable. Right vertebral artery within normal. Balanced vertebral arteries.    AORTIC ARCH: Classic aortic arch anatomy. 25% stenosis at the origin of the left subclavian artery with fibrofatty noncalcified plaque. The other aortic arch vessel origins are unremarkable.    NONVASCULAR STRUCTURES: Mild cervical spondylosis. Sphenoid sinus inflammatory changes with acute and chronic appearance.      Impression    IMPRESSION:     HEAD CTA:   1.  Normal CTA [place].    NECK CTA:  1.  Mild-to-moderate carotid bifurcation stenoses and mild stenosis at left vertebral artery origin. Negative for dissection.   CT Lumbar Spine w/o Contrast    Narrative    EXAM: CT LUMBAR SPINE W/O CONTRAST  LOCATION: [facility]  DATE: 12/30/2023    INDICATION: Fall. Back pain. Traumatic injury.  COMPARISON: None.  TECHNIQUE: Routine CT Lumbar Spine without IV contrast. Multiplanar reformats. Dose reduction techniques were used.     FINDINGS:  VERTEBRA: Decreased osseous mineralization. Normal vertebral body heights. Levoconvex curvature of the lumbar spine. No fracture or posttraumatic subluxation.     CANAL/FORAMINA: Mild multilevel  degenerative changes. No high-grade central spinal canal stenosis. No significant neural foraminal stenosis.    PARASPINAL: No extraspinal abnormality.      Impression    IMPRESSION:  1.  No fracture or posttraumatic subluxation.  2.  No high-grade spinal canal or neural foraminal stenosis.   Echocardiogram Complete     Value    LVEF  60-65%    [facility]    [id number]  [id number]  [id number]  [id number]^RACHAEL^OFE^AMAURI     [facility]  Echocardiography Laboratory  [address] [place] [address]     Name: SILVA EVERETT  MRN: [id number]  : 1940  Study Date: 2023 03:04 PM  Age: 83 yrs  Gender: Female  Patient Location: [facility]  Reason For Study: Syncope and Collapse  Ordering Physician: OFE CANO  Referring Physician: Lia Saini  Performed By: Amaya Greco     BSA: 1.8 m2  Height: 65 in  Weight: 166 lb  HR: 85  BP: 175/75 mmHg  ______________________________________________________________________________  Procedure  Complete Portable Echo Adult. Optison (NDC #[id number]) given intravenously.  ______________________________________________________________________________  Interpretation Summary     Left ventricular systolic function is normal.  The visual ejection fraction is 60-65%.  No regional wall motion abnormalities noted.  Technically difficult, suboptimal study. No hemodynamically significant  valvular abnormalities on 2D or color flow imaging. There is no comparison  study available.  ______________________________________________________________________________  Left Ventricle  The left ventricle is normal in size. Grade I or early diastolic dysfunction.  Left ventricular systolic function is normal. The visual ejection fraction is  60-65%. No regional wall motion abnormalities noted.     Right Ventricle  The right ventricle is not well visualized. The right ventricular systolic  function is normal.     Atria  The left atrium is not well  visualized. Normal left atrial size. Right atrium  not well visualized.     Mitral Valve  The mitral valve is not well visualized. There is trace mitral regurgitation.     Tricuspid Valve  The tricuspid valve is not well visualized. Right ventricular systolic  pressure could not be approximated due to inadequate tricuspid regurgitation.     Aortic Valve  The aortic valve is not well visualized. No hemodynamically significant  valvular aortic stenosis.     Pulmonic Valve  The pulmonic valve is not well visualized. Normal pulmonic valve velocity.     Vessels  Normal size aorta. Inferior vena cava not well visualized for estimation of  right atrial pressure. The inferior vena cava is normal.     Pericardium  The pericardium is not well visualized.     ______________________________________________________________________________  MMode/2D Measurements & Calculations  IVSd: 0.94 cm  LVIDd: 4.4 cm  LVIDs: 3.0 cm  LVPWd: 1.0 cm  FS: 33.0 %  LV mass(C)d: 143.3 grams  LV mass(C)dI: 78.4 grams/m2     Ao root diam: 2.7 cm  LA dimension: 3.2 cm  asc Aorta Diam: 2.8 cm  LA/Ao: 1.2  LVOT diam: 2.0 cm  LVOT area: 3.1 cm2  Ao root diam index Ht(cm/m): 1.6  Ao root diam index BSA (cm/m2): 1.5  Asc Ao diam index BSA (cm/m2): 1.5  Asc Ao diam index Ht(cm/m): 1.7  RWT: 0.46  TAPSE: 2.0 cm     Doppler Measurements & Calculations  MV E max davin: 81.2 cm/sec  MV A max davin: 118.0 cm/sec  MV E/A: 0.69  MV dec time: 0.20 sec  Ao V2 max: 171.0 cm/sec  Ao max P.0 mmHg  Ao V2 mean: 119.0 cm/sec  Ao mean P.0 mmHg  Ao V2 VTI: 31.6 cm  DIAMOND(I,D): 2.0 cm2  DIAMOND(V,D): 1.6 cm2  LV V1 max P.9 mmHg  LV V1 max: 85.4 cm/sec  LV V1 VTI: 20.1 cm  SV(LVOT): 63.1 ml  SI(LVOT): 34.6 ml/m2     PA acc time: 0.14 sec  AV Davin Ratio (DI): 0.50  DIAMOND Index (cm2/m2): 1.1  E/E' av.3  Lateral E/e': 10.1  Medial E/e': 8.5  RV S Davin: 11.0 cm/sec     ______________________________________________________________________________  Report approved by:  Angelique Love 12/31/2023 06:26 PM             Discharge Medications   Current Discharge Medication List        START taking these medications    Details   acetaminophen (TYLENOL) 325 MG tablet Take 2 tablets (650 mg) by mouth every 4 hours as needed for mild pain    Associated Diagnoses: Head injury, initial encounter      Melatonin 10 MG SUBL Place 5 mg under the tongue nightly as needed (insomnia)    Associated Diagnoses: Primary insomnia           CONTINUE these medications which have NOT CHANGED    Details   aspirin 81 MG EC tablet Take 81 mg by mouth daily      calcium-magnesium (CALMAG) 500-250 MG TABS Take 1 tablet by mouth daily.      escitalopram (LEXAPRO) 10 MG tablet Take 10 mg by mouth daily      glimepiride (AMARYL) 1 MG tablet Take 1 mg by mouth every morning (before breakfast)      metoprolol succinate ER (TOPROL XL) 25 MG 24 hr tablet Take 25 mg by mouth daily      Multiple Vitamin (MULTI-VITAMIN) per tablet Take 1 tablet by mouth daily.      Vitamin D3 (CHOLECALCIFEROL) 25 mcg (1000 units) tablet Take 1,000 Units by mouth daily           Allergies   Allergies   Allergen Reactions    Sulfa Antibiotics

## 2024-01-02 NOTE — PROGRESS NOTES
Observation goals  PRIOR TO DISCHARGE        Comments: -diagnostic tests and consults completed and resulted: Not met  -vital signs normal or at patient baseline: Progressing.   Nurse to notify provider when observation goals have been met and patient is ready for discharge.

## 2024-01-02 NOTE — PROGRESS NOTES
01/02/24 1221   Appointment Info   Signing Clinician's Name / Credentials (PT) Brittany Dressler, PT   Interventions   Interventions Quick Adds Physical Perf Test/Measures;Neuromuscular Re-ed   Neuromuscular Re-education   Neuromuscular Re-Education Minutes (81112) 15   Symptoms Noted During/After Treatment fatigue;dizziness;shortness of breath   Treatment Detail/Skilled Intervention Controlled mobility training via amb RW SBA then no AD Maureen adding balance tasks in with head & body turns.      3 stairs 2 rails step-to, mild SOB. Pt woozy / with mild motion sickness after 300' walk.    PT edu pt on vestibular weakness with dizziness, imbalance and instructed in VORx1 HEP H & V 5x/wk to tolerance with handout provided.   Physical Performance Test or Measurement (Report Req)    Physical Performance Test/Measurement Minutes, w/report (12520) 8   Symptoms Noted During/After Treatment none   Treatment Detail/Skilled Intervention DGI 6/24 - falls risk < 20. Horizontal VORx1 positive for dizziness and slips, pt avoiding R head turns.   PT Discharge Planning   PT Plan PT: balance, VORx1 HEP teach back, concussion edu as warranted.   PT Discharge Recommendation (DC Rec) Transitional Care Facility   PT Rationale for DC Rec Pt is well below baseline of mod IND mobility with walker, pt drives and lives INDly in an apt. Currently she is confused and has no memory of the fall, needs min assist for mobility and is unsteady/at risk for falling. Rec TCU. If she does not go to TCU would recommend assist with all mobility d/t high fall risk.   PT Brief overview of current status Maureen no AD, DGI /24 - falls risk.   PT Equipment Needed at Discharge gait belt;walker, rolling;shower chair   Total Session Time   Timed Code Treatment Minutes 23   Total Session Time (sum of timed and untimed services) 23     Physical Therapy Discharge Summary    Reason for therapy discharge:    Discharged to transitional care facility.    Progress towards  therapy goal(s). See goals on Care Plan in [facility] electronic health record for goal details.  Goals partially met.  Barriers to achieving goals:   discharge from facility.    Therapy recommendation(s):    Continued therapy is recommended.  Rationale/Recommendations:  per above.

## 2024-01-02 NOTE — PROGRESS NOTES
[facility]    Medicine Progress Note - Hospitalist Service    Date of Admission:  12/30/2023    Assessment & Plan   Meredith Allen is a 83 year old female with below PMHx who was admitted on 12/30/2023.  She presented after sustaining an unwitnessed fall in her apartment, described syncope. Registered to observation for further evaluation and treatment.     Medically ready for dismissal 1/2/23, awaiting placement.      Unwitnessed fall, described syncope   Orthostatic hypotension  Elevated troponin, flat  Soft tissue injury with ecchymosis, L malar region extending over L lateral oribit wall: Recent trip to [place] over the holidays to visit family, while there she unfortunately came down with the stomach flu. Returned home via airplane 12/26. Reports some worsening back pain since trip from sleeping in a recliner at her daughter's house. On 12/30, pt woke up in the AM, but does not recall the events of the early morning, had left her apartment for some reason and upon return she had a described syncopal event. Unwitnessed. LOC for unclear period of time. No prodromal chest pain, dizziness, palpitations. No recent syncope. When she came to, she had injured her back further, thus could not get up. Called for help and a neighbor and firefighters were able to get her to the ED.   *Slightly tachycardic to the 120s with /94 on arrival, since resolved   *CBC with leukocytosis 15.6 on admission which resolved to 10.9 on repeat. CMP unremarkable. Lacit 0.9, Lipase WNL. CK 59. Trop 15>15>17>16. UA unremarkable. BG NGTD. AM cortisol WNL.   *EKG with sinus tachy (102 BPM), no evidence of acute ischemia   *CT head with superficial soft tissue swelling, otherwise unremarkable. CTA head and neck, CT cervical and lumbar spine negative for acute pathology. CT A/P notes a few areas of heterogeneous low-attenuation in both kidneys, indeterminate. Findings could be seen in the setting of pyelonephritis or  sequelae of chronic scarring. Please correlate clinically for any signs and/or symptoms of acute infection. Suspect this represents chronic scarring as UA unremarkable and pt without urinary sx and with improvement in back pain throughout hospitalization. Afebrile.   *Echo with preserved EF, no RWMA, no significant valve disease. Grade 1 or early diastolic dysfunction.   - Orthostatics positive on admission for which patient received IVF hydration. Suspect dehydration with orthostatic hypotension in the setting of baseline poor oral fluid intake compounded by recent gastroenteritis. Pt also takes tylenol PM every evening which could have played a part.   - Telemetry throughout stay without overt arrhythmia, will discharge with 7 day cardiac monitor   - Adequate oral fluid intake encouraged, pt poor water drinker at baseline compounded with recent gastroenteritis   - Discussed stopping Tylenol PM as this is contributing to morning somnolence and increasing fall risk for patient. Will utilize melatonin 5 mg at bedtime PRN  - SW helping to facilitate discharge to TCU, referrals sent   - DaughterNithya, updated 1/2 on plan of care as above     Acute on chronic low back pain, improved: Worsening back pain over the past week from sleeping in a recliner while staying with her daughter in [place] after coming down with gastroenteritis. Visited with chiropractor for a few sessions. Back pain further worsened subsequent to fall above. CT imaging negative for acute pathology as above. Abdominal binder was provided to patient which has provided relief. No saddle anesthesia, worsening radicular sx (baseline thigh involvement), difficulty with urination or BMs.   - Continue abdominal binder   - PRN tylenol   - Ice/heat   - PT consult      Accelerated hypertension, improved: Blood pressure initially 220 but this improved after IV Benadryl use for nausea. PTA metoprolol had been held during hospital stay with SBPs in the 140s in the  "setting of orthostatic hypotension.   - Resumed metoprolol 1/2/23  - Monitor BPs at TCU    T2DM, non-insulin dependent, uncontrolled:   Hemoglobin A1C   Date Value Ref Range Status   12/30/2023 8.6 (H) <5.7 % Final     Comment:     Normal <5.7%   Prediabetes 5.7-6.4%    Diabetes 6.5% or higher     Note: Adopted from ADA consensus guidelines.   [Amaryl 1 mg po qd]  - Treated with sliding scale insulin during hospitalization, can resume Amaryl at discharge, but recommend uptitration to ensure better diabetes control by TCU team and PCP outpatient.     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 Asymptomatic.   - Continue PTA ASA 81 mg po every day at discharge   - Lipid profile outpatient and risk factor optimization per PCP     A few areas of heterogeneous low-attenuation in both kidneys, indeterminate: Noted on CT. Per radiology read, \"findings could be seen in the setting of pyelonephritis or sequelae of chronic scarring. Please correlate clinically for any signs and/or symptoms of acute infection.\"   *Suspect this represents chronic scarring as UA unremarkable and pt without urinary sx and with improvement in back pain throughout hospitalization. Afebrile.   - No indication for antibiotics      Observation Goals: -diagnostic tests and consults completed and resulted, -vital signs normal or at patient baseline, Nurse to notify provider when observation goals have been met and patient is ready for discharge.  Diet: Advance Diet as Tolerated: Regular Diet Adult    DVT Prophylaxis: Pneumatic Compression Devices and Ambulate every shift  Saavedra Catheter: Not present  Lines: None     Cardiac Monitoring: None  Code Status: Full Code      Clinically Significant Risk Factors Present on Admission                # " Drug Induced Platelet Defect: home medication list includes an antiplatelet medication       # DMII: A1C = 8.6 % (Ref range: <5.7 %) within past 6 months               Disposition Plan      Expected Discharge Date: 01/02/2024    Discharge Delays: *Medically Ready for Discharge  Placement - TCU  Destination: inpatient rehabilitation facility  Discharge Comments: PT rec TCU  Neuro checks  orthostatic BP's  SW/CC consult          The patient's care was discussed with the Attending Physician, Dr. MAR Eugene, Bedside Nurse, Patient, and Patient's Family.    Kiarra Mike PA-C  Hospitalist Service  [facility]  Securely message with [employer] (more info)  Text page via [facility] Paging/Directory   ______________________________________________________________________    Interval History   Assumed care 1/2. No acute events overnight. Back pain controlled with abdominal binder. Slightly hypertensive, metoprolol restarted this AM. Denies dizziness, lightheadedness, chest pain. Awaiting placement. Daughter updated via telephone.     Physical Exam   Vital Signs: Temp: 97.4  F (36.3  C) Temp src: Oral BP: (!) 147/83 Pulse: 74   Resp: 18 SpO2: 97 % O2 Device: None (Room air)    Weight: 167 lbs 4.8 oz    CONSTITUTIONAL: Pt sitting upright in chair, dressed in hospital garb. Appears comfortable. Cooperative with interview  HEENT: Normocephalic, atraumatic.   CARDIOVASCULAR: RRR, no murmurs, rubs, or extra heart sounds appreciated. Pulses +2/4 and regular in upper and lower extremities, bilaterally.   RESPIRATORY: No increased work of breathing.  CTA, bilat; no wheezes, rales, or rhonchi appreciated.  GASTROINTESTINAL:  Abdomen soft, non-distended. BS auscultated in all four quadrants. Negative for tenderness to palpation.    MUSCULOSKELETAL: No gross deformities noted. Normal muscle tone.   HEMATOLOGIC/LYMPHATIC/IMMUNOLOGIC: Negative for lower extremity edema, bilaterally.  NEUROLOGIC: Alert and  oriented to person, place, and time. No focal neuro deficits,   SKIN: Warm, dry, intact. No jaundice noted. L ecchymosis and soft tissue swelling near orbit.        Medical Decision Making       50 MINUTES SPENT BY ME on the date of service doing chart review, history, exam, documentation & further activities per the note.      Data         Imaging results reviewed over the past 24 hrs:   No results found for this or any previous visit (from the past 24 hour(s)).

## 2024-01-02 NOTE — PROGRESS NOTES
Care Management Discharge Note    Discharge Date: 01/02/2024       Discharge Disposition: Transitional Care    Discharge Services: None    Discharge DME:      Discharge Transportation: Son at [phone]    Private pay costs discussed: Not applicable    Does the patient's insurance plan have a 3 day qualifying hospital stay waiver?  Yes     Which insurance plan 3 day waiver is available? Alternative insurance waiver    Will the waiver be used for post-acute placement? Yes    PAS Confirmation Code: [id number]  Patient/family educated on Medicare website which has current facility and service quality ratings: yes    Education Provided on the Discharge Plan:    Persons Notified of Discharge Plans: MD, TAD, Charge RN, bedside RN, pt, son, daughter  Patient/Family in Agreement with the Plan: yes    Handoff Referral Completed: No    Additional Information:  Pt has been accepted to [facility]. Called Gabrielle at [facility] (pt's first choice) confirmed they do have a shared room and can accept pt today. Informed that pt will discharge with [employer] heart monitor, Gabrielle aware and agreeable. Completed [facility] dayron low, approval #[id number] for dates 1/2-1/6. Spoke with pt, who is excited and in agreement with discharge. Son in room and will transport between [phone]. Paged MD for orders. Sent discharge orders to TCU. Completed PAS, faxed and on chart. Left VM for Gabreille at TCU to confirm discharge time. Updated daughter Martita on discharge plans.    PAS-RR    D: Per DHS regulation, [facility] completed and submitted PAS-RR to [place] [employer] on Aging Direct Connect via the Senior LinkAge Line.  PAS-RR confirmation # is : [id number]    I: [facility] spoke with pt and they are aware a PAS-RR has been submitted.  SW reviewed with pt that they may be contacted for a follow up appointment within 10 days of hospital discharge if their SNF stay is < 30 days.  Contact information for [facility] LinkAge Line was also provided.    A: Pt  verbalized understanding.    P: Further questions may be directed to Senior LinkAge Line at #[phone], option #4 for [facility]- staff.     STEPHAN Gonzalez  Social Work  [facility]

## 2024-01-02 NOTE — PROGRESS NOTES
PRIMARY Concern: Fall  SAFETY RISK Concerns (fall risk, behaviors, etc.): fall risk       Isolation/Type: none  Tests/Procedures for NEXT shift:   Consults? (Pending/following, signed-off?) None  Where is patient from? (Home, TCU, etc.): Ind living facility   Other Important info for NEXT shift: Neuro & Ortho BP q8  Anticipated DC date & active delays: 1/2/23     SUMMARY NOTE:  Orientation/Cognitive: A/O x4, forgetful    Observation Goals (Met/ Not Met): met  Mobility Level/Assist Equipment: SBA/GB/W  Antibiotics & Plan (IV/po, length of tx left): none  Pain Management: denies pain   Tele/VS/O2: VSS on RA, ex HTN. Tele: SR  ABNL Lab/BG: See chart  Diet: regular  Bowel/Bladder: continence  Skin Concerns:  L eyebrow laceration and scattered bruising   Drains/Devices: None  Patient Stated Goal for Today: Rest        Patient discharge confirmed by  to TCU. Patient changed into home clothing and IV removed. Packet given to patient to give to TCU nurses upon arrival. All belongings gathered and sent with patient. Patient brought down to door 6 by [facility] at 1400 to be picked up by son-in-law.

## 2024-01-02 NOTE — CONSULTS
Care Management Initial Consult    General Information  Assessment completed with: Patient,    Type of CM/SW Visit: Initial Assessment    Primary Care Provider verified and updated as needed:     Readmission within the last 30 days:        Reason for Consult: discharge planning  Advance Care Planning: Advance Care Planning Reviewed: present on chart          Communication Assessment  Patient's communication style: spoken language (English or Bilingual)    Hearing Difficulty or Deaf: no        Cognitive  Cognitive/Neuro/Behavioral: .WDL except  Level of Consciousness: intermittent confusion, alert  Arousal Level: opens eyes spontaneously  Orientation: oriented x 4 (Forgetful at times)  Mood/Behavior: calm, cooperative  Best Language: 0 - No aphasia  Speech: clear, spontaneous, logical    Living Environment:   People in home: alone     Current living Arrangements: apartment      Able to return to prior arrangements: yes       Family/Social Support:  Care provided by: self  Provides care for: no one  Marital Status:   Children          Description of Support System: Supportive    Support Assessment: Adequate family and caregiver support    Current Resources:   Patient receiving home care services: No     Community Resources: None  Equipment currently used at home: walker, rolling  Supplies currently used at home: None    Employment/Financial:  Employment Status: retired        Financial Concerns:             Does the patient's insurance plan have a 3 day qualifying hospital stay waiver?  Yes     Which insurance plan 3 day waiver is available? Alternative insurance waiver    Will the waiver be used for post-acute placement? Yes    Lifestyle & Psychosocial Needs:  Social Determinants of Health     Food Insecurity: Not on file   Depression: Not on file   Housing Stability: Not on file   Tobacco Use: Not on file   Financial Resource Strain: Not on file   Alcohol Use: Not on file   Transportation Needs: Not on file    Physical Activity: Not on file   Interpersonal Safety: Not on file   Stress: Not on file   Social Connections: Not on file       Functional Status:  Prior to admission patient needed assistance:   Dependent ADLs:: Independent  Dependent IADLs:: Independent       Mental Health Status:          Chemical Dependency Status:                Values/Beliefs:  Spiritual, Cultural Beliefs, [employer] Practices, Values that affect care:                 Additional Information:  Spoke with pt for initial consult. Pt was admitted on 12/30/23 after an unwitnessed fall, per H&P. Pt lives in an apartment alone, reports spouse had a stay at [facility] (like LT - pt states spouse passed away here). Pt aware of PT recommendation for TCU and pt's top choice would be to go to [facility], as this is a familiar setting. Pt has a very supportive family (19 grandchildren) and daughter Martita is a public health RN, involved in pt's care and pt would like SW to update her. When asked for back up choices, pt states they are not picky and a facility near home area of [facility] is ideal. Called daughter Martita to update, she requested an update once timeline and facility are known. Discussed transport options as well. Sent referrals.     STEPHAN Gonzalez  Social Work  [facility]

## 2024-01-03 ENCOUNTER — TRANSITIONAL CARE UNIT VISIT (OUTPATIENT)
Dept: GERIATRICS | Facility: CLINIC | Age: 84
End: 2024-01-03
Payer: COMMERCIAL

## 2024-01-03 VITALS
WEIGHT: 167 LBS | OXYGEN SATURATION: 96 % | DIASTOLIC BLOOD PRESSURE: 79 MMHG | TEMPERATURE: 98.4 F | SYSTOLIC BLOOD PRESSURE: 178 MMHG | RESPIRATION RATE: 18 BRPM | HEIGHT: 65 IN | BODY MASS INDEX: 27.82 KG/M2 | HEART RATE: 73 BPM
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
## 2024-01-03 DIAGNOSIS — I95.1 ORTHOSTATIC HYPOTENSION: ICD-10-CM

## 2024-01-03 DIAGNOSIS — Z87.898 HISTORY OF TACHYCARDIA: ICD-10-CM

## 2024-01-03 DIAGNOSIS — G47.00 INSOMNIA, UNSPECIFIED TYPE: ICD-10-CM

## 2024-01-03 DIAGNOSIS — F33.9 DEPRESSION, RECURRENT (H): ICD-10-CM

## 2024-01-03 DIAGNOSIS — G47.33 OSA (OBSTRUCTIVE SLEEP APNEA): ICD-10-CM

## 2024-01-03 DIAGNOSIS — F51.01 PRIMARY INSOMNIA: ICD-10-CM

## 2024-01-03 DIAGNOSIS — S09.90XA HEAD INJURY, INITIAL ENCOUNTER: ICD-10-CM

## 2024-01-03 DIAGNOSIS — I10 ESSENTIAL HYPERTENSION: ICD-10-CM

## 2024-01-03 DIAGNOSIS — R51.9 ACUTE NONINTRACTABLE HEADACHE, UNSPECIFIED HEADACHE TYPE: ICD-10-CM

## 2024-01-03 DIAGNOSIS — I65.23 BILATERAL CAROTID ARTERY STENOSIS: ICD-10-CM

## 2024-01-03 DIAGNOSIS — E78.5 DYSLIPIDEMIA: ICD-10-CM

## 2024-01-03 DIAGNOSIS — M54.50 BILATERAL LOW BACK PAIN WITHOUT SCIATICA, UNSPECIFIED CHRONICITY: ICD-10-CM

## 2024-01-03 DIAGNOSIS — E11.9 TYPE 2 DIABETES MELLITUS WITHOUT COMPLICATION, WITHOUT LONG-TERM CURRENT USE OF INSULIN (H): ICD-10-CM

## 2024-01-03 DIAGNOSIS — R53.81 PHYSICAL DECONDITIONING: ICD-10-CM

## 2024-01-03 DIAGNOSIS — S05.12XD ECCHYMOSIS OF LEFT EYE, SUBSEQUENT ENCOUNTER: ICD-10-CM

## 2024-01-03 LAB — SARS-COV-2 RNA RESP QL NAA+PROBE: NEGATIVE

## 2024-01-03 PROCEDURE — 36415 COLL VENOUS BLD VENIPUNCTURE: CPT | Performed by: NURSE PRACTITIONER

## 2024-01-03 PROCEDURE — 99310 SBSQ NF CARE HIGH MDM 45: CPT | Performed by: NURSE PRACTITIONER

## 2024-01-03 PROCEDURE — 86481 TB AG RESPONSE T-CELL SUSP: CPT | Performed by: NURSE PRACTITIONER

## 2024-01-03 PROCEDURE — P9604 ONE-WAY ALLOW PRORATED TRIP: HCPCS | Performed by: NURSE PRACTITIONER

## 2024-01-03 RX ORDER — ACETAMINOPHEN 325 MG/1
650 TABLET ORAL AT BEDTIME
Start: 2024-01-03 | End: 2024-01-05

## 2024-01-03 NOTE — PATIENT INSTRUCTIONS
Orders  Meredith Allen  1940  1) Monitor blood sugars 3 times daily.    2) Start carb controlled diet.  3) Change melatonin to 5 mg scheduled at bedtime. Diagnosis: insomnia  4) Discontinue tylenol  5) Start tylenol 650 mg scheduled at bedtime and Tylenol 650 mg 3 times daily as needed for pain.  6) Abdominal binder on in AM, off in PM for orthostatic hypotension.   7) Change melatonin to 5 mg scheduled at bedtime. Diagnosis: insomnia  KATIE Chinchilla CNP on 1/3/2024 at 10:24 AM

## 2024-01-03 NOTE — LETTER
1/3/2024        RE: Meredith Allen  [address]  [facility] [address]        [facility] GERIATRICS    PRIMARY CARE PROVIDER AND CLINIC:  Lia Saini MD, [address] [address] / [facility] [address]  Chief Complaint   Patient presents with     Hospital F/U      [facility] Medical Record Number:  [id number]  Place of Service where encounter took place:  [facility] (formerly [facility])  ([facility]) [[id number]]    Meredith Allen  is a 83 year old  (1940), admitted to the above facility from  [facility]. Hospital stay 12/30/23 through 1/2/24..   HPI:    PMH significant for HTN, dyslipidemia, tachycardia, BPPV, type 2 diabetes, migraine, depression, IDA.    Summary of recent hospitalization:  Patient was hospitalized at [facility] from 12/30/2024 to 1/2/2024 for fall.  Patient presented to the emergency department for evaluation after an unwitnessed fall, patient does not recall falling or loss of consciousness, not aware how long she was on the floor, but presented with hematoma above left eye and pain in the back of her head.  Patient also with emesis and required multiple doses of antiemetics to control the vomiting.  Laboratory evaluation significant for high-sensitivity troponin 15, WBC 15.6, glucose 252.  UA with glucose 300, ketones 20, otherwise negative for infection, blood culture x 2 negative.  CTA head and neck shows normal CTA [place], mild to moderate carotid bifurcation stenosis and mild stenosis as left vertebral artery origin, negative for dissection.  CT cervical spine negative for acute fracture or posttraumatic subluxation.  Head CT negative for acute intracranial process, superficial soft tissue swelling.  CT lumbar spine negative for fracture or posttraumatic subluxation, no high-grade spinal canal or neural foraminal stenosis.  CT abdomen and pelvis shows no acute traumatic findings in abdomen or pelvis, few areas of heterogeneous  low-attenuation in both kidneys indeterminant.  Echo from 12/31/2023 shows LVEF 60 to 65%, no regional wall motion abnormalities, grade 1 diastolic dysfunction.  Required intermittent IV fluids for positive orthostatic vital signs.  PTA Benadryl at bedtime changed to melatonin. Discharged to TCU for physical rehabilitation and medical management.     Reviewed notes, imaging, labs from recent hospitalization.    Today patient was seen for admission visit in the TCU. She is sitting up in a recliner. She is oriented x3, Lives at the [facility]. She reports headache and back pain at time of visit, denies radiation of pain -rates 4/10- tylenol effective for headache and pain usually.  She denies any shortness of breath, chest pain, dysuria/trouble urinating.  She reports dizziness/lightheadedness with positional changes at times.  She tells me that her bowels are moving regularly.  She tells me that she lost her spouse this past summer, has had trouble sleeping since then.  She was previously using Benadryl, we discussed the change to melatonin and she would like this scheduled.  We discussed what to expect in the TCU.    Reviewed facility EMR including medications, recent nursing progress notes, vital signs.  Discussed patient with nursing staff today.    CODE STATUS/ADVANCE DIRECTIVES DISCUSSION:  CPR/Full code   ALLERGIES:   Allergies   Allergen Reactions     Sulfa Antibiotics       PAST MEDICAL HISTORY:   Past Medical History:   Diagnosis Date     Back pain      Hyperlipidemia      Migraines       PAST SURGICAL HISTORY:   has a past surgical history that includes Hysterectomy; Cholecystectomy; colonoscopy; Cystectomy ovarian benign; Hysterectomy total abdominal, repair bladder, combined; and Phacoemulsification clear cornea with standard intraocular lens implant (2/15/2012).  FAMILY HISTORY: family history is not on file.  SOCIAL HISTORY:   reports that she has never smoked. She does not have any smokeless  "tobacco history on file. She reports that she does not drink alcohol.  Patient's living condition: lives alone    Post Discharge Medication Reconciliation Status:   MED REC REQUIRED  Post Medication Reconciliation Status:  Discharge medications reconciled and changed, see notes/orders       Current Outpatient Medications   Medication Sig     acetaminophen (TYLENOL) 325 MG tablet Take 2 tablets (650 mg) by mouth at bedtime And TID PRN     aspirin 81 MG EC tablet Take 81 mg by mouth daily     calcium-magnesium (CALMAG) 500-250 MG TABS Take 1 tablet by mouth daily.     escitalopram (LEXAPRO) 10 MG tablet Take 10 mg by mouth daily     glimepiride (AMARYL) 1 MG tablet Take 1 mg by mouth every morning (before breakfast)     Melatonin 10 MG SUBL Place 5 mg under the tongue at bedtime     metoprolol succinate ER (TOPROL XL) 25 MG 24 hr tablet Take 25 mg by mouth daily     Multiple Vitamin (MULTI-VITAMIN) per tablet Take 1 tablet by mouth daily.     Vitamin D3 (CHOLECALCIFEROL) 25 mcg (1000 units) tablet Take 1,000 Units by mouth daily     No current facility-administered medications for this visit.       ROS:  10 point ROS of systems including Constitutional, Eyes, Respiratory, Cardiovascular, Gastroenterology, Genitourinary, Integumentary, Musculoskeletal, Psychiatric were all negative except for pertinent positives noted in my HPI.    Vitals:  BP (!) 178/79   Pulse 73   Temp 98.4  F (36.9  C)   Resp 18   Ht 1.651 m (5' 5\")   Wt 75.8 kg (167 lb)   SpO2 96%   BMI 27.79 kg/m    Exam:  GENERAL APPEARANCE:  Alert, in NAD  HEENT: normocephalic, moist mucous membranes, nose without drainage or crusting  RESP:  respiratory effort normal, no respiratory distress, Lung sounds clear, patient is on RA  CV: auscultation of heart done, rate and rhythm regular.  ABDOMEN: + bowel sounds, soft, nontender, no grimacing or guarding with palpation.  M/S: no lower extremity edema  SKIN:  Inspection and palpation of skin and " subcutaneous tissue: skin warm, dry without rashes, bruising to left lateral eye area  NEURO: cranial nerves 2-12 grossly intact and at patient's baseline; moves extremities freely  PSYCH: oriented x 3, affect and mood normal      Lab/Diagnostic data:  Labs done in SNF are in [facility]. Please refer to them using EPIC/Care Everywhere. and Recent labs in EPIC reviewed by me today.     ASSESSMENT/PLAN:  Fall, suspected syncope  Soft tissue injury with ecchymosis, left Maller region extending over left lateral orbit wall  Headache with history of migraine headache  Acute on chronic low back pain  CT head, cervical spine and lumbar spine negative for acute fracture or findings  With occasional dizziness with positional changes, with headache and back pain today, no vision changes or radiation of pain  Plan: Change tylenol to 650 mg at bedtime scheduled and TID PRN for pain.  Monitor headache and back pain.    Orthostatic hypotension  Essential hypertension  History of Tachycardia  Required IV hydration inpatient   With occasional dizziness/lightheadedness with positional changes  -150 and one /90, HR 73-80  Plan: Continue metoprolol ER 25 mg daily. Encourage hydration. Monitor heart rate and blood pressure. SBP goal <150 due to orthostasis. Abdominal binder on in AM, off in PM. Consider adding compression stockings if ongoing symptoms.    Mild to moderate carotid bifurcation stenosis per CTA neck on 12/30/2023  Mild stenosis at left vertebral artery origin per CTA neck on 12/30/2023  Dyslipidemia  Plan: Continue aspirin 81 mg daily. Not currently on statin- per care everywhere looks like she has an allergy to it. Follow up with PCP to discuss medical management.    Type 2 diabetes  Hemoglobin A1c 8.6% on 12/30/2023  No blood sugars available since admission, BS elevated inpatient  Plan: Continue glimepiride 1 mg daily.  Monitor blood sugars 3 times daily.  Start carb controlled  diet.    Depression  Insomnia  Grief- patient lost spouse in 7/2023  PTA benadryl changed to melatonin inpatient  Plan: Continue escitalopram 10 mg daily. Change melatonin to 5 mg scheduled at bedtime.  Monitor mood and symptoms.  Monitor sleep.  Consider ACP referral as needed.    IDA  Not currently using CPAP, tells me she is currently in the process of working with sleep clinic to have this reevaluated   Plan: Monitor oxygen saturations PRN while sleeping. Follow up with sleep clinic outpatient.     Physical deconditioning  Secondary to fall, recent hospitalization, medical conditions as above  Plan: Encourage participation in physical therapy/occupational therapy for strengthening and deconditioning. Discharge planning per their recommendation. Social work to assist with d/c planning.        Disclaimer: This note may contain text created using speech-recognition software and may contain unintended word substitutions.       Total time spent with patient visit at the skilled nursing facility was 46 minutes including patient visit, review of past records, and discussion with nursing staff.     Electronically signed by:  KATIE Chinchilla CNP                     Sincerely,        KATIE Chinchilla CNP

## 2024-01-04 ENCOUNTER — LAB REQUISITION (OUTPATIENT)
Dept: LAB | Facility: CLINIC | Age: 84
End: 2024-01-04
Payer: COMMERCIAL

## 2024-01-04 DIAGNOSIS — U07.1 COVID-19: ICD-10-CM

## 2024-01-04 LAB
QUANTIFERON MITOGEN: 10 IU/ML
QUANTIFERON NIL TUBE: 0.03 IU/ML
QUANTIFERON TB1 TUBE: 0.03 IU/ML
QUANTIFERON TB2 TUBE: 0.04

## 2024-01-04 PROCEDURE — 87635 SARS-COV-2 COVID-19 AMP PRB: CPT | Performed by: NURSE PRACTITIONER

## 2024-01-05 ENCOUNTER — DOCUMENTATION ONLY (OUTPATIENT)
Dept: GERIATRICS | Facility: CLINIC | Age: 84
End: 2024-01-05

## 2024-01-05 ENCOUNTER — TRANSITIONAL CARE UNIT VISIT (OUTPATIENT)
Dept: GERIATRICS | Facility: CLINIC | Age: 84
End: 2024-01-05
Payer: COMMERCIAL

## 2024-01-05 VITALS
OXYGEN SATURATION: 98 % | DIASTOLIC BLOOD PRESSURE: 72 MMHG | HEIGHT: 65 IN | HEART RATE: 72 BPM | TEMPERATURE: 98 F | SYSTOLIC BLOOD PRESSURE: 147 MMHG | BODY MASS INDEX: 27.82 KG/M2 | WEIGHT: 167 LBS | RESPIRATION RATE: 17 BRPM

## 2024-01-05 DIAGNOSIS — S05.12XD ECCHYMOSIS OF LEFT EYE, SUBSEQUENT ENCOUNTER: ICD-10-CM

## 2024-01-05 DIAGNOSIS — M54.50 BILATERAL LOW BACK PAIN WITHOUT SCIATICA, UNSPECIFIED CHRONICITY: ICD-10-CM

## 2024-01-05 DIAGNOSIS — I95.1 ORTHOSTATIC HYPOTENSION: ICD-10-CM

## 2024-01-05 DIAGNOSIS — G47.33 OSA (OBSTRUCTIVE SLEEP APNEA): ICD-10-CM

## 2024-01-05 DIAGNOSIS — R51.9 ACUTE NONINTRACTABLE HEADACHE, UNSPECIFIED HEADACHE TYPE: ICD-10-CM

## 2024-01-05 DIAGNOSIS — S09.90XA HEAD INJURY, INITIAL ENCOUNTER: ICD-10-CM

## 2024-01-05 DIAGNOSIS — E11.9 TYPE 2 DIABETES MELLITUS WITHOUT COMPLICATION, WITHOUT LONG-TERM CURRENT USE OF INSULIN (H): ICD-10-CM

## 2024-01-05 DIAGNOSIS — K59.01 SLOW TRANSIT CONSTIPATION: Primary | ICD-10-CM

## 2024-01-05 DIAGNOSIS — R53.81 PHYSICAL DECONDITIONING: ICD-10-CM

## 2024-01-05 DIAGNOSIS — I65.23 BILATERAL CAROTID ARTERY STENOSIS: ICD-10-CM

## 2024-01-05 DIAGNOSIS — Z87.898 HISTORY OF TACHYCARDIA: ICD-10-CM
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
## 2024-01-05 DIAGNOSIS — E78.5 DYSLIPIDEMIA: ICD-10-CM

## 2024-01-05 DIAGNOSIS — I10 ESSENTIAL HYPERTENSION: ICD-10-CM

## 2024-01-05 DIAGNOSIS — F33.9 DEPRESSION, RECURRENT (H): ICD-10-CM

## 2024-01-05 DIAGNOSIS — G47.00 INSOMNIA, UNSPECIFIED TYPE: ICD-10-CM

## 2024-01-05 LAB
BACTERIA BLD CULT: NO GROWTH
BACTERIA BLD CULT: NO GROWTH
GAMMA INTERFERON BACKGROUND BLD IA-ACNC: 0.03 IU/ML
M TB IFN-G BLD-IMP: NEGATIVE
M TB IFN-G CD4+ BCKGRND COR BLD-ACNC: 9.97 IU/ML
MITOGEN IGNF BCKGRD COR BLD-ACNC: 0 IU/ML
MITOGEN IGNF BCKGRD COR BLD-ACNC: 0.01 IU/ML
SARS-COV-2 RNA RESP QL NAA+PROBE: NEGATIVE

## 2024-01-05 PROCEDURE — 99309 SBSQ NF CARE MODERATE MDM 30: CPT | Performed by: NURSE PRACTITIONER

## 2024-01-05 RX ORDER — LIDOCAINE 4 G/G
2 PATCH TOPICAL EVERY 24 HOURS
Start: 2024-01-05 | End: 2024-07-16

## 2024-01-05 RX ORDER — ACETAMINOPHEN 325 MG/1
650 TABLET ORAL 3 TIMES DAILY
Start: 2024-01-05 | End: 2024-07-16

## 2024-01-05 RX ORDER — SENNA AND DOCUSATE SODIUM 50; 8.6 MG/1; MG/1
1 TABLET, FILM COATED ORAL AT BEDTIME
Start: 2024-01-05 | End: 2024-01-11

## 2024-01-05 RX ORDER — POLYETHYLENE GLYCOL 3350 17 G/17G
17 POWDER, FOR SOLUTION ORAL DAILY PRN
Start: 2024-01-05 | End: 2024-01-11

## 2024-01-05 NOTE — PATIENT INSTRUCTIONS
Orders  Meredith GUERITA Alejandro  1940  1) Start lidocaine patch 4%. Apply 2 patches to low back on in a.m. off in p.m, no patch in place x12 hours for pain.  2) Discontinue current tylenol orders  3) Start tylenol 650 mg TID and once daily PRN for pain.   4) If no BM today after bowel medications obtain abdominal xray to rule out obstruction.  5) Administer MiraLAX 17 g stat and senna S2 tabs stat for constipation.  6) Start senna S1 tab at bedtime and 1 tab twice daily PRN for constipation  7) Start MiraLAX daily as needed Diagnosis: constipation  Hali Dinero, KATIE CNP on 1/5/2024 at 11:10 AM

## 2024-01-05 NOTE — PROGRESS NOTES
[facility] GERIATRICS    Chief Complaint   Patient presents with    RECHECK     HPI:  Meredith Allen is a 83 year old  (1940), who is being seen today for an episodic care visit at: [facility]  ([facility]) [[id number]].     PMH significant for HTN, dyslipidemia, tachycardia, BPPV, type 2 diabetes, migraine, depression, IDA.     Summary of recent hospitalization:  Patient was hospitalized at [facility] from 12/30/2024 to 1/2/2024 for fall.  Patient presented to the emergency department for evaluation after an unwitnessed fall, patient does not recall falling or loss of consciousness, not aware how long she was on the floor, but presented with hematoma above left eye and pain in the back of her head.  Patient also with emesis and required multiple doses of antiemetics to control the vomiting.  Laboratory evaluation significant for high-sensitivity troponin 15, WBC 15.6, glucose 252.  UA with glucose 300, ketones 20, otherwise negative for infection, blood culture x 2 negative.  CTA head and neck shows normal CTA [place], mild to moderate carotid bifurcation stenosis and mild stenosis as left vertebral artery origin, negative for dissection.  CT cervical spine negative for acute fracture or posttraumatic subluxation.  Head CT negative for acute intracranial process, superficial soft tissue swelling.  CT lumbar spine negative for fracture or posttraumatic subluxation, no high-grade spinal canal or neural foraminal stenosis.  CT abdomen and pelvis shows no acute traumatic findings in abdomen or pelvis, few areas of heterogeneous low-attenuation in both kidneys indeterminant.  Echo from 12/31/2023 shows LVEF 60 to 65%, no regional wall motion abnormalities, grade 1 diastolic dysfunction.  Required intermittent IV fluids for positive orthostatic vital signs.  PTA Benadryl at bedtime changed to melatonin. Discharged to [facility] for physical rehabilitation and medical management.     Today  "patient was seen for episodic follow-up in the TCU.  She reports ongoing back pain that overall is chronic, we reviewed pain regimen and discussed some additions as below.  she denies any radiation of the pain.  She also reports ongoing headaches, reports history of chronic headaches and reports current headaches are the same as previous, no worse.  Denies any vision changes.  Reports occasional dizziness/lightheadedness, however overall it is better than it was previously.  She denies any shortness of breath, chest pain, dysuria/trouble urinating.  She reports that she does not feel like her bowels are moving well, reports very small pieces of feces and loose stools.  She denies any nausea or abdominal pain.  Patient continues to work with therapy.    Reviewed facility EMR including medications, recent nursing progress notes, vital signs.  Discussed plan of care with nursing staff today    Allergies, and PMH/PSH reviewed in EPIC today.  REVIEW OF SYSTEMS:  6 point ROS of systems including Constitutional,  Respiratory, Cardiovascular, Gastroenterology, Genitourinary, Musculoskeletal were all negative except for pertinent positives noted in my HPI.    Objective:   BP (!) 147/72   Pulse 72   Temp 98  F (36.7  C)   Resp 17   Ht 1.651 m (5' 5\")   Wt 75.8 kg (167 lb)   SpO2 98%   BMI 27.79 kg/m    GENERAL APPEARANCE:  Alert, in NAD  HEENT: normocephalic, moist mucous membranes, nose without drainage or crusting  RESP:  respiratory effort normal, no respiratory distress, Lung sounds clear, patient is on RA  CV: auscultation of heart done, rate and rhythm regular.  ABDOMEN: + bowel sounds, soft, tenderness with palpation   M/S: no lower extremity edema  NEURO: cranial nerves 2-12 grossly intact and at patient's baseline; moves extremities freely  PSYCH: affect and mood normal      Labs done in SNF are in [place] EPIC. Please refer to them using [employer]/Care Everywhere. and Recent labs in EPIC reviewed by me today. "     Assessment/Plan:  Fall, suspected syncope  Soft tissue injury with ecchymosis, left Maller region extending over left lateral orbit wall  Headache with history of migraine headache  Acute on chronic low back pain  CT head, cervical spine and lumbar spine negative for acute fracture or findings  Reports ongoing chronic headaches that are unchanged from baseline, also with ongoing back pain, does not radiate down legs, however is increased compared to baseline  Plan: Start lidocaine patch x2 to back on in a.m. off in p.m, change tylenol to 650 mg TID and once daily PRN for pain.  Monitor headache and back pain.     Orthostatic hypotension  Essential hypertension  History of Tachycardia  Required IV hydration inpatient   Continues to have occasional dizziness/lightheadedness  -178 recently, majority of BP are under 140; HR 70s  Plan: Continue metoprolol ER 25 mg daily. Encourage hydration. Monitor heart rate and blood pressure. SBP goal <150 due to orthostasis. Abdominal binder on in AM, off in PM. Consider adding compression stockings if ongoing symptoms.     Mild to moderate carotid bifurcation stenosis per CTA neck on 12/30/2023  Mild stenosis at left vertebral artery origin per CTA neck on 12/30/2023  Dyslipidemia  Plan: Continue aspirin 81 mg daily. Not currently on statin- per care everywhere looks like she has an allergy to it. Follow up with PCP to discuss medical management.     Type 2 diabetes  Hemoglobin A1c 8.6% on 12/30/2023  -222  Plan: Continue glimepiride 1 mg daily.  Monitor blood sugars 3 times daily. Continue carb controlled diet.     Depression  Insomnia  Grief- patient lost spouse in 7/2023  PTA benadryl changed to melatonin inpatient, melatonin is effective  Plan: Continue escitalopram 10 mg daily. Continue melatonin 5 mg scheduled at bedtime.  Monitor mood and symptoms.  Monitor sleep.  Consider ACP referral as needed.     Slow transit constipation  With small amounts of bowel  and some watery stool at times, denies abdominal pain and cramping, does have generalized tenderness with palpation  Plan: Administer MiraLAX 17 g stat and senna S2 tabs stat, discussed with nursing staff today.  Start senna S1 tab at bedtime and 1 tab twice daily PRN and MiraLAX daily as needed.  Nursing to monitor bowels closely, if no BM today after bowel medications obtain abdominal xray to rule out obstruction.    IDA  Not currently using CPAP, tells me she is currently in the process of working with sleep clinic to have this reevaluated   Plan: Monitor oxygen saturations PRN while sleeping. Follow up with sleep clinic outpatient.      Physical deconditioning  Secondary to fall, recent hospitalization, medical conditions as above  Plan: Encourage participation in physical therapy/occupational therapy for strengthening and deconditioning. Discharge planning per their recommendation. Social work to assist with d/c planning.      MED REC REQUIRED  Post Medication Reconciliation Status:  Medication reconciliation previously completed during another office visit        Disclaimer: This note may contain text created using speech-recognition software and may contain unintended word substitutions.       Electronically signed by: KATIE Chinchilla CNP

## 2024-01-05 NOTE — LETTER
1/5/2024        RE: Meredith Allen  [address]  [facility] [address]        [facility] GERIATRICS    Chief Complaint   Patient presents with     RECHECK     HPI:  Meredith Allen is a 83 year old  (1940), who is being seen today for an episodic care visit at: [facility]  ([facility]) [[id number]].     PMH significant for HTN, dyslipidemia, tachycardia, BPPV, type 2 diabetes, migraine, depression, IDA.     Summary of recent hospitalization:  Patient was hospitalized at [facility] from 12/30/2024 to 1/2/2024 for fall.  Patient presented to the emergency department for evaluation after an unwitnessed fall, patient does not recall falling or loss of consciousness, not aware how long she was on the floor, but presented with hematoma above left eye and pain in the back of her head.  Patient also with emesis and required multiple doses of antiemetics to control the vomiting.  Laboratory evaluation significant for high-sensitivity troponin 15, WBC 15.6, glucose 252.  UA with glucose 300, ketones 20, otherwise negative for infection, blood culture x 2 negative.  CTA head and neck shows normal CTA [place] of [place], mild to moderate carotid bifurcation stenosis and mild stenosis as left vertebral artery origin, negative for dissection.  CT cervical spine negative for acute fracture or posttraumatic subluxation.  Head CT negative for acute intracranial process, superficial soft tissue swelling.  CT lumbar spine negative for fracture or posttraumatic subluxation, no high-grade spinal canal or neural foraminal stenosis.  CT abdomen and pelvis shows no acute traumatic findings in abdomen or pelvis, few areas of heterogeneous low-attenuation in both kidneys indeterminant.  Echo from 12/31/2023 shows LVEF 60 to 65%, no regional wall motion abnormalities, grade 1 diastolic dysfunction.  Required intermittent IV fluids for positive orthostatic vital signs.  PTA Benadryl at bedtime changed  "to melatonin. Discharged to TCU for physical rehabilitation and medical management.     Today patient was seen for episodic follow-up in the TCU.  She reports ongoing back pain that overall is chronic, we reviewed pain regimen and discussed some additions as below.  she denies any radiation of the pain.  She also reports ongoing headaches, reports history of chronic headaches and reports current headaches are the same as previous, no worse.  Denies any vision changes.  Reports occasional dizziness/lightheadedness, however overall it is better than it was previously.  She denies any shortness of breath, chest pain, dysuria/trouble urinating.  She reports that she does not feel like her bowels are moving well, reports very small pieces of feces and loose stools.  She denies any nausea or abdominal pain.  Patient continues to work with therapy.    Reviewed facility EMR including medications, recent nursing progress notes, vital signs.  Discussed plan of care with nursing staff today    Allergies, and PMH/PSH reviewed in EPIC today.  REVIEW OF SYSTEMS:  6 point ROS of systems including Constitutional,  Respiratory, Cardiovascular, Gastroenterology, Genitourinary, Musculoskeletal were all negative except for pertinent positives noted in my HPI.    Objective:   BP (!) 147/72   Pulse 72   Temp 98  F (36.7  C)   Resp 17   Ht 1.651 m (5' 5\")   Wt 75.8 kg (167 lb)   SpO2 98%   BMI 27.79 kg/m    GENERAL APPEARANCE:  Alert, in NAD  HEENT: normocephalic, moist mucous membranes, nose without drainage or crusting  RESP:  respiratory effort normal, no respiratory distress, Lung sounds clear, patient is on RA  CV: auscultation of heart done, rate and rhythm regular.  ABDOMEN: + bowel sounds, soft, tenderness with palpation   M/S: no lower extremity edema  NEURO: cranial nerves 2-12 grossly intact and at patient's baseline; moves extremities freely  PSYCH: affect and mood normal      Labs done in SNF are in [place] EPIC. " Please refer to them using EPIC/Care Everywhere. and Recent labs in [facility] reviewed by me today.     Assessment/Plan:  Fall, suspected syncope  Soft tissue injury with ecchymosis, left Maller region extending over left lateral orbit wall  Headache with history of migraine headache  Acute on chronic low back pain  CT head, cervical spine and lumbar spine negative for acute fracture or findings  Reports ongoing chronic headaches that are unchanged from baseline, also with ongoing back pain, does not radiate down legs, however is increased compared to baseline  Plan: Start lidocaine patch x2 to back on in a.m. off in p.m, change tylenol to 650 mg TID and once daily PRN for pain.  Monitor headache and back pain.     Orthostatic hypotension  Essential hypertension  History of Tachycardia  Required IV hydration inpatient   Continues to have occasional dizziness/lightheadedness  -178 recently, majority of BP are under 140; HR 70s  Plan: Continue metoprolol ER 25 mg daily. Encourage hydration. Monitor heart rate and blood pressure. SBP goal <150 due to orthostasis. Abdominal binder on in AM, off in PM. Consider adding compression stockings if ongoing symptoms.     Mild to moderate carotid bifurcation stenosis per CTA neck on 12/30/2023  Mild stenosis at left vertebral artery origin per CTA neck on 12/30/2023  Dyslipidemia  Plan: Continue aspirin 81 mg daily. Not currently on statin- per care everywhere looks like she has an allergy to it. Follow up with PCP to discuss medical management.     Type 2 diabetes  Hemoglobin A1c 8.6% on 12/30/2023  -222  Plan: Continue glimepiride 1 mg daily.  Monitor blood sugars 3 times daily. Continue carb controlled diet.     Depression  Insomnia  Grief- patient lost spouse in 7/2023  PTA benadryl changed to melatonin inpatient, melatonin is effective  Plan: Continue escitalopram 10 mg daily. Continue melatonin 5 mg scheduled at bedtime.  Monitor mood and symptoms.  Monitor sleep.   Consider ACP referral as needed.     Slow transit constipation  With small amounts of bowel and some watery stool at times, denies abdominal pain and cramping, does have generalized tenderness with palpation  Plan: Administer MiraLAX 17 g stat and senna S2 tabs stat, discussed with nursing staff today.  Start senna S1 tab at bedtime and 1 tab twice daily PRN and MiraLAX daily as needed.  Nursing to monitor bowels closely, if no BM today after bowel medications obtain abdominal xray to rule out obstruction.    IDA  Not currently using CPAP, tells me she is currently in the process of working with sleep clinic to have this reevaluated   Plan: Monitor oxygen saturations PRN while sleeping. Follow up with sleep clinic outpatient.      Physical deconditioning  Secondary to fall, recent hospitalization, medical conditions as above  Plan: Encourage participation in physical therapy/occupational therapy for strengthening and deconditioning. Discharge planning per their recommendation. Social work to assist with d/c planning.      MED REC REQUIRED  Post Medication Reconciliation Status:  Medication reconciliation previously completed during another office visit        Disclaimer: This note may contain text created using speech-recognition software and may contain unintended word substitutions.       Electronically signed by: KATIE Chinchilla CNP         Sincerely,        KATIE Chinchilla CNP

## 2024-01-06 ENCOUNTER — LAB REQUISITION (OUTPATIENT)
Dept: LAB | Facility: CLINIC | Age: 84
End: 2024-01-06
Payer: COMMERCIAL

## 2024-01-06 DIAGNOSIS — U07.1 COVID-19: ICD-10-CM

## 2024-01-06 PROCEDURE — 87635 SARS-COV-2 COVID-19 AMP PRB: CPT | Performed by: NURSE PRACTITIONER

## 2024-01-07 LAB
CREAT BLD-MCNC: 0.9 MG/DL (ref 0.5–1)
EGFRCR SERPLBLD CKD-EPI 2021: >60 ML/MIN/1.73M2
SARS-COV-2 RNA RESP QL NAA+PROBE: NEGATIVE

## 2024-01-08 ENCOUNTER — LAB REQUISITION (OUTPATIENT)
Dept: LAB | Facility: CLINIC | Age: 84
End: 2024-01-08
Payer: COMMERCIAL

## 2024-01-08 DIAGNOSIS — U07.1 COVID-19: ICD-10-CM

## 2024-01-08 PROCEDURE — 87635 SARS-COV-2 COVID-19 AMP PRB: CPT | Performed by: NURSE PRACTITIONER

## 2024-01-08 NOTE — PROGRESS NOTES
[facility] GERIATRICS    Chief Complaint   Patient presents with    RECHECK     HPI:  Meredith Allen is a 83 year old  (1940), who is being seen today for an episodic care visit at: [facility]  ([facility]) [[id number]].     PMH significant for HTN, dyslipidemia, tachycardia, BPPV, type 2 diabetes, migraine, depression, IDA.     Summary of recent hospitalization:  Patient was hospitalized at [facility] from 12/30/2024 to 1/2/2024 for fall.  Patient presented to the emergency department for evaluation after an unwitnessed fall, patient does not recall falling or loss of consciousness, not aware how long she was on the floor, but presented with hematoma above left eye and pain in the back of her head.  Patient also with emesis and required multiple doses of antiemetics to control the vomiting.  Laboratory evaluation significant for high-sensitivity troponin 15, WBC 15.6, glucose 252.  UA with glucose 300, ketones 20, otherwise negative for infection, blood culture x 2 negative.  CTA head and neck shows normal CTA [place], mild to moderate carotid bifurcation stenosis and mild stenosis as left vertebral artery origin, negative for dissection.  CT cervical spine negative for acute fracture or posttraumatic subluxation.  Head CT negative for acute intracranial process, superficial soft tissue swelling.  CT lumbar spine negative for fracture or posttraumatic subluxation, no high-grade spinal canal or neural foraminal stenosis.  CT abdomen and pelvis shows no acute traumatic findings in abdomen or pelvis, few areas of heterogeneous low-attenuation in both kidneys indeterminant.  Echo from 12/31/2023 shows LVEF 60 to 65%, no regional wall motion abnormalities, grade 1 diastolic dysfunction.  Required intermittent IV fluids for positive orthostatic vital signs.  PTA Benadryl at bedtime changed to melatonin. Discharged to [facility] for physical rehabilitation and medical management.     Today  "patient was seen for routine follow-up in the TCU.  She tells me that her last bowel movement was over this weekend, she feels uncomfortable with ongoing constipation, per nursing documentation last bowel movement that was extra-large was 1/6, she did have a small bowel movement on 1/7.  She denies any abdominal pain or nausea.  She continues to have back pain that is worse than baseline, finds lidocaine patch very helpful, overall pain is managed.  She denies any radiation of the pain.  She denies any shortness of breath, chest pain, dizziness/lightheadedness, dysuria/trouble urinating.  Patient continues to work with therapy.  ACP referral was placed on 1/8 after discussion with  due to recent loss of her spouse and grief.    Reviewed facility EMR including medications, recent nursing progress notes, vital signs.  Discussed plan of care with nursing staff today.    Allergies, and PMH/PSH reviewed in EPIC today.  REVIEW OF SYSTEMS:  6 point ROS of systems including Constitutional, Respiratory, Cardiovascular, Gastroenterology, Genitourinary, Musculoskeletal  were all negative except for pertinent positives noted in my HPI.    Objective:   BP 96/56   Pulse 66   Temp 98.2  F (36.8  C)   Resp 16   Ht 1.702 m (5' 7\")   Wt 75.8 kg (167 lb)   SpO2 97%   BMI 26.16 kg/m    GENERAL APPEARANCE:  Alert, in NAD  HEENT: normocephalic, moist mucous membranes, nose without drainage or crusting  RESP:  respiratory effort normal, no respiratory distress, Lung sounds clear, patient is on RA  CV: auscultation of heart done, rate and rhythm regular.   ABDOMEN: + bowel sounds, soft, tenderness to palpation of LLQ  M/S:   no lower extremity edema  NEURO: cranial nerves 2-12 grossly intact and at patient's baseline; moves extremities freely  PSYCH: affect and mood normal      Labs done in SNF are in [place] EPIC. Please refer to them using [employer]/Care Everywhere. and Recent labs in EPIC reviewed by me today. "     Assessment/Plan:  Fall, suspected syncope  Soft tissue injury with ecchymosis, left Maller region extending over left lateral orbit wall  Headache with history of migraine headache  Acute on chronic low back pain  CT head, cervical spine and lumbar spine negative for acute fracture or findings  With chronic history of headaches, unchanged from baseline and ongoing back pain, denies radiation down legs, pain is managed  Plan: Continue lidocaine patch x2 to back on in a.m. off in p.m,. Continue tylenol 650 mg TID and once daily PRN for pain.  Monitor headache and back pain.     Orthostatic hypotension  Essential hypertension  History of Tachycardia  Required IV hydration inpatient   Continues to have occasional dizziness/lightheadedness  SBP soft this AM 96/56- metoprolol held-discussed with nurse to push fluids; otherwise has been 100s-130s, occasional 140; HR 60-70s  Plan: Continue metoprolol ER 25 mg daily. Encourage hydration. Monitor heart rate and blood pressure. SBP goal <150 due to orthostasis. Abdominal binder on in AM, off in PM. Consider adding compression stockings if ongoing symptoms.     Mild to moderate carotid bifurcation stenosis per CTA neck on 12/30/2023  Mild stenosis at left vertebral artery origin per CTA neck on 12/30/2023  Dyslipidemia  Plan: Continue aspirin 81 mg daily. Not currently on statin- per care everywhere looks like she has an allergy to it. Follow up with PCP to discuss medical management.     Type 2 diabetes  Hemoglobin A1c 8.6% on 12/30/2023  -170 recently  Plan: Continue glimepiride 1 mg daily. Hold if BS<100.  Monitor blood sugars 3 times daily. Continue carb controlled diet.     Depression  Insomnia  Grief- patient lost spouse in 7/2023  PTA benadryl changed to melatonin inpatient, melatonin is effective  ACP referral was placed 1/8 per her request when meeting with  due to recent loss of her spouse and grief  Plan: Continue escitalopram 10 mg daily.  Continue melatonin 5 mg scheduled at bedtime.  Monitor mood and symptoms.  Monitor sleep.  ACP following     Slow transit constipation  Reports last good BM was 1/6, did have small BM 1/7, continues to feel very constipated, with some LLQ tenderness on palpation, bowel sounds present, no pain or nausea  Plan: Start miralax 17 gram daily and continue daily PRN. Continue senna S2 1 tab at bedtime and 1 tab twice daily PRN. Monitor bowels closely.     IDA  Not currently using CPAP, tells me she is currently in the process of working with sleep clinic to have this reevaluated   Plan: Monitor oxygen saturations PRN while sleeping. Follow up with sleep clinic outpatient.      Cognitive impairment  SLUMS 22/30, CPT 5/5.6  Plan: Based on cognitive testing patient lives alone with weekly check-in for safety and to monitor for problem-solving.  Patient lives in an independent living facility apartment alone and receives assistance with cleaning, patient demonstrates ability to sequence through self-care tasks.  Recommend assistance with weekly medication set up and finance management, which patient reports her daughter can provide, patient reports she is still driving short distances and is open for driving evaluation in the future.    Physical deconditioning  Secondary to fall, recent hospitalization, medical conditions as above  Continues to work with therapy  Plan: Encourage participation in physical therapy/occupational therapy for strengthening and deconditioning. Discharge planning per their recommendation. Social work to assist with d/c planning.     ADDENDUM: Patient found to be positive for COVID 19 via routine screening by PCR. She has received 3 COVID vaccines. She is currently asymptomatic. Patient was placed in isolation, she is in contagious phase. She will remain in single room isolation and transmission based requirements for 10 days from positive result.      MED REC REQUIRED  Post Medication Reconciliation  Status:  Medication reconciliation previously completed during another office visit      Disclaimer: This note may contain text created using speech-recognition software and may contain unintended word substitutions.         Electronically signed by: KATIE Chinchilla CNP

## 2024-01-09 ENCOUNTER — TRANSITIONAL CARE UNIT VISIT (OUTPATIENT)
Dept: GERIATRICS | Facility: CLINIC | Age: 84
End: 2024-01-09
Payer: COMMERCIAL

## 2024-01-09 VITALS
BODY MASS INDEX: 26.21 KG/M2 | DIASTOLIC BLOOD PRESSURE: 56 MMHG | HEIGHT: 67 IN | WEIGHT: 167 LBS | OXYGEN SATURATION: 97 % | TEMPERATURE: 98.2 F | RESPIRATION RATE: 16 BRPM | SYSTOLIC BLOOD PRESSURE: 96 MMHG | HEART RATE: 66 BPM

## 2024-01-09 DIAGNOSIS — G47.00 INSOMNIA, UNSPECIFIED TYPE: ICD-10-CM

## 2024-01-09 DIAGNOSIS — Z87.898 HISTORY OF HEADACHE: ICD-10-CM
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
## 2024-01-09 DIAGNOSIS — F33.9 DEPRESSION, RECURRENT (H): ICD-10-CM

## 2024-01-09 DIAGNOSIS — E11.9 TYPE 2 DIABETES MELLITUS WITHOUT COMPLICATION, WITHOUT LONG-TERM CURRENT USE OF INSULIN (H): ICD-10-CM

## 2024-01-09 DIAGNOSIS — G47.33 OSA (OBSTRUCTIVE SLEEP APNEA): ICD-10-CM

## 2024-01-09 DIAGNOSIS — I10 ESSENTIAL HYPERTENSION: ICD-10-CM

## 2024-01-09 DIAGNOSIS — R41.89 COGNITIVE IMPAIRMENT: ICD-10-CM

## 2024-01-09 DIAGNOSIS — F43.21 GRIEF: ICD-10-CM

## 2024-01-09 DIAGNOSIS — R53.81 PHYSICAL DECONDITIONING: ICD-10-CM

## 2024-01-09 DIAGNOSIS — Z87.898 HISTORY OF TACHYCARDIA: ICD-10-CM

## 2024-01-09 DIAGNOSIS — E78.5 DYSLIPIDEMIA: ICD-10-CM

## 2024-01-09 DIAGNOSIS — M54.50 BILATERAL LOW BACK PAIN WITHOUT SCIATICA, UNSPECIFIED CHRONICITY: Primary | ICD-10-CM

## 2024-01-09 DIAGNOSIS — I65.23 BILATERAL CAROTID ARTERY STENOSIS: ICD-10-CM

## 2024-01-09 DIAGNOSIS — I95.1 ORTHOSTATIC HYPOTENSION: ICD-10-CM

## 2024-01-09 DIAGNOSIS — K59.01 SLOW TRANSIT CONSTIPATION: ICD-10-CM

## 2024-01-09 LAB — SARS-COV-2 RNA RESP QL NAA+PROBE: POSITIVE

## 2024-01-09 PROCEDURE — 99309 SBSQ NF CARE MODERATE MDM 30: CPT | Performed by: NURSE PRACTITIONER

## 2024-01-09 NOTE — LETTER
1/9/2024        RE: Meredith Allen  [address]  [facility] [id number]        [facility] GERIATRICS    Chief Complaint   Patient presents with     RECHECK     HPI:  Meredith Allen is a 83 year old  (1940), who is being seen today for an episodic care visit at: [facility]  ([facility]) [[id number]].     PMH significant for HTN, dyslipidemia, tachycardia, BPPV, type 2 diabetes, migraine, depression, IDA.     Summary of recent hospitalization:  Patient was hospitalized at [facility] from 12/30/2024 to 1/2/2024 for fall.  Patient presented to the emergency department for evaluation after an unwitnessed fall, patient does not recall falling or loss of consciousness, not aware how long she was on the floor, but presented with hematoma above left eye and pain in the back of her head.  Patient also with emesis and required multiple doses of antiemetics to control the vomiting.  Laboratory evaluation significant for high-sensitivity troponin 15, WBC 15.6, glucose 252.  UA with glucose 300, ketones 20, otherwise negative for infection, blood culture x 2 negative.  CTA head and neck shows normal CTA [place], mild to moderate carotid bifurcation stenosis and mild stenosis as left vertebral artery origin, negative for dissection.  CT cervical spine negative for acute fracture or posttraumatic subluxation.  Head CT negative for acute intracranial process, superficial soft tissue swelling.  CT lumbar spine negative for fracture or posttraumatic subluxation, no high-grade spinal canal or neural foraminal stenosis.  CT abdomen and pelvis shows no acute traumatic findings in abdomen or pelvis, few areas of heterogeneous low-attenuation in both kidneys indeterminant.  Echo from 12/31/2023 shows LVEF 60 to 65%, no regional wall motion abnormalities, grade 1 diastolic dysfunction.  Required intermittent IV fluids for positive orthostatic vital signs.  PTA Benadryl at bedtime changed  "to melatonin. Discharged to TCU for physical rehabilitation and medical management.     Today patient was seen for routine follow-up in the TCU.  She tells me that her last bowel movement was over this weekend, she feels uncomfortable with ongoing constipation, per nursing documentation last bowel movement that was extra-large was 1/6, she did have a small bowel movement on 1/7.  She denies any abdominal pain or nausea.  She continues to have back pain that is worse than baseline, finds lidocaine patch very helpful, overall pain is managed.  She denies any radiation of the pain.  She denies any shortness of breath, chest pain, dizziness/lightheadedness, dysuria/trouble urinating.  Patient continues to work with therapy.  ACP referral was placed on 1/8 after discussion with  due to recent loss of her spouse and grief.    Reviewed facility EMR including medications, recent nursing progress notes, vital signs.  Discussed plan of care with nursing staff today.    Allergies, and PMH/PSH reviewed in EPIC today.  REVIEW OF SYSTEMS:  6 point ROS of systems including Constitutional, Respiratory, Cardiovascular, Gastroenterology, Genitourinary, Musculoskeletal  were all negative except for pertinent positives noted in my HPI.    Objective:   BP 96/56   Pulse 66   Temp 98.2  F (36.8  C)   Resp 16   Ht 1.702 m (5' 7\")   Wt 75.8 kg (167 lb)   SpO2 97%   BMI 26.16 kg/m    GENERAL APPEARANCE:  Alert, in NAD  HEENT: normocephalic, moist mucous membranes, nose without drainage or crusting  RESP:  respiratory effort normal, no respiratory distress, Lung sounds clear, patient is on RA  CV: auscultation of heart done, rate and rhythm regular.   ABDOMEN: + bowel sounds, soft, tenderness to palpation of LLQ  M/S:   no lower extremity edema  NEURO: cranial nerves 2-12 grossly intact and at patient's baseline; moves extremities freely  PSYCH: affect and mood normal      Labs done in SNF are in [place] EPIC. Please " refer to them using EPIC/Care Everywhere. and Recent labs in [facility] reviewed by me today.     Assessment/Plan:  Fall, suspected syncope  Soft tissue injury with ecchymosis, left Maller region extending over left lateral orbit wall  Headache with history of migraine headache  Acute on chronic low back pain  CT head, cervical spine and lumbar spine negative for acute fracture or findings  With chronic history of headaches, unchanged from baseline and ongoing back pain, denies radiation down legs, pain is managed  Plan: Continue lidocaine patch x2 to back on in a.m. off in p.m,. Continue tylenol 650 mg TID and once daily PRN for pain.  Monitor headache and back pain.     Orthostatic hypotension  Essential hypertension  History of Tachycardia  Required IV hydration inpatient   Continues to have occasional dizziness/lightheadedness  SBP soft this AM 96/56- metoprolol held-discussed with nurse to push fluids; otherwise has been 100s-130s, occasional 140; HR 60-70s  Plan: Continue metoprolol ER 25 mg daily. Encourage hydration. Monitor heart rate and blood pressure. SBP goal <150 due to orthostasis. Abdominal binder on in AM, off in PM. Consider adding compression stockings if ongoing symptoms.     Mild to moderate carotid bifurcation stenosis per CTA neck on 12/30/2023  Mild stenosis at left vertebral artery origin per CTA neck on 12/30/2023  Dyslipidemia  Plan: Continue aspirin 81 mg daily. Not currently on statin- per care everywhere looks like she has an allergy to it. Follow up with PCP to discuss medical management.     Type 2 diabetes  Hemoglobin A1c 8.6% on 12/30/2023  -170 recently  Plan: Continue glimepiride 1 mg daily. Hold if BS<100.  Monitor blood sugars 3 times daily. Continue carb controlled diet.     Depression  Insomnia  Grief- patient lost spouse in 7/2023  PTA benadryl changed to melatonin inpatient, melatonin is effective  ACP referral was placed 1/8 per her request when meeting with   due to recent loss of her spouse and grief  Plan: Continue escitalopram 10 mg daily. Continue melatonin 5 mg scheduled at bedtime.  Monitor mood and symptoms.  Monitor sleep.  ACP following     Slow transit constipation  Reports last good BM was 1/6, did have small BM 1/7, continues to feel very constipated, with some LLQ tenderness on palpation, bowel sounds present, no pain or nausea  Plan: Start miralax 17 gram daily and continue daily PRN. Continue senna S2 1 tab at bedtime and 1 tab twice daily PRN. Monitor bowels closely.     IDA  Not currently using CPAP, tells me she is currently in the process of working with sleep clinic to have this reevaluated   Plan: Monitor oxygen saturations PRN while sleeping. Follow up with sleep clinic outpatient.      Cognitive impairment  SLUMS 22/30, CPT 5/5.6  Plan: Based on cognitive testing patient lives alone with weekly check-in for safety and to monitor for problem-solving.  Patient lives in an independent living facility apartment alone and receives assistance with cleaning, patient demonstrates ability to sequence through self-care tasks.  Recommend assistance with weekly medication set up and finance management, which patient reports her daughter can provide, patient reports she is still driving short distances and is open for driving evaluation in the future.    Physical deconditioning  Secondary to fall, recent hospitalization, medical conditions as above  Continues to work with therapy  Plan: Encourage participation in physical therapy/occupational therapy for strengthening and deconditioning. Discharge planning per their recommendation. Social work to assist with d/c planning.             MED REC REQUIRED  Post Medication Reconciliation Status:  Medication reconciliation previously completed during another office visit      Disclaimer: This note may contain text created using speech-recognition software and may contain unintended word substitutions.          Electronically signed by: KATIE Chinchilla CNP         Sincerely,        Hali Dinero, KATIE CNP

## 2024-01-10 VITALS
SYSTOLIC BLOOD PRESSURE: 152 MMHG | RESPIRATION RATE: 16 BRPM | WEIGHT: 165 LBS | HEART RATE: 87 BPM | DIASTOLIC BLOOD PRESSURE: 88 MMHG | HEIGHT: 65 IN | BODY MASS INDEX: 27.49 KG/M2 | OXYGEN SATURATION: 95 % | TEMPERATURE: 96.8 F

## 2024-01-10 NOTE — PROGRESS NOTES
[facility] GERIATRICS DISCHARGE SUMMARY  PATIENT'S NAME: Meredith Allen  YOB: 1940  MEDICAL RECORD NUMBER:  [id number]  Place of Service where encounter took place:  [facility]  (U) [[id number]]    PRIMARY CARE PROVIDER AND CLINIC RESPONSIBLE AFTER TRANSFER:   Lia Saini MD, [id number] [facility] [id number]    Non-FMG Provider     Transferring providers: Hali Dinero, KATIE EDGE, Melissa Elizabeth MD    Recent Hospitalization/ED:  [facility] Hospital stay 12/30/2024 to 1/2/2024.  Date of SNF Admission: January 02, 2024  Date of SNF (anticipated) Discharge: January 11, 2024-patient choosing to discharge 1 day earlier than insurance driven discharge  Discharged to: [facility]  Cognitive Scores: SLUMS 22/30, CPT 5/5.6  Physical Function: Set up for eating, grooming/hygiene and upper body dressing, contact-guard assist for lower body dressing, toileting and showering, transfers contact-guard to standby assist with front wheel walker, bed mobility contact-guard assist to standby assist, ambulating 150 feet with front wheel walker      CODE STATUS/ADVANCE DIRECTIVES DISCUSSION:  Full code  ALLERGIES: Sulfa antibiotics    NURSING FACILITY COURSE   Medication Changes/Rationale:   In the hospital they discontinued benadryl and started on melatonin  Started on bowel medication in the TCU for constipation, if you develop loose stools back off on them  Started on lidocaine patch for pain management    PMH significant for HTN, dyslipidemia, tachycardia, BPPV, type 2 diabetes, migraine, depression, IDA.     Summary of recent hospitalization:  Patient was hospitalized at [facility] from 12/30/2024 to 1/2/2024 for fall.  Patient presented to the emergency department for evaluation after an unwitnessed fall, patient does not recall falling or loss of consciousness, not aware how long she was on the floor, but presented with hematoma above  left eye and pain in the back of her head.  Patient also with emesis and required multiple doses of antiemetics to control the vomiting.  Laboratory evaluation significant for high-sensitivity troponin 15, WBC 15.6, glucose 252.  UA with glucose 300, ketones 20, otherwise negative for infection, blood culture x 2 negative.  CTA head and neck shows normal CTA [place], mild to moderate carotid bifurcation stenosis and mild stenosis as left vertebral artery origin, negative for dissection.  CT cervical spine negative for acute fracture or posttraumatic subluxation.  Head CT negative for acute intracranial process, superficial soft tissue swelling.  CT lumbar spine negative for fracture or posttraumatic subluxation, no high-grade spinal canal or neural foraminal stenosis.  CT abdomen and pelvis shows no acute traumatic findings in abdomen or pelvis, few areas of heterogeneous low-attenuation in both kidneys indeterminant.  Echo from 12/31/2023 shows LVEF 60 to 65%, no regional wall motion abnormalities, grade 1 diastolic dysfunction.  Required intermittent IV fluids for positive orthostatic vital signs.  PTA Benadryl at bedtime changed to melatonin. Discharged to TCU for physical rehabilitation and medical management.     Summary of nursing facility stay:   Patient was seen today for discharge evaluation in the TCU, patient requesting to discharge 1 day early.  She tells me she is feeling great, she has no shortness of breath, cough, dizziness/lightheadedness, reports her appetite is very good.  She continues to have constipation.  She reports that her back pain has been managed well, really likes the lidocaine patches.  She denies any dysuria/trouble urinating.  We discussed follow-up with PCP outpatient.  Also discussed home care orders that were placed today.    Specific problems addressed in the TCU  COVID 19  positive for COVID 19 via routine screening by PCR on 1/8/2024  Has received 3 COVID vaccines  She  is in the contagious phase  Patient is asymptomatic, respiratory status is stable.  Plan: Provide supportive cares. Monitor symptoms. Continue single room isolation and transmission based requirements for 10 days from positive result.    Fall, suspected syncope  Soft tissue injury with ecchymosis, left Maller region extending over left lateral orbit wall  Headache with history of migraine headache  Acute on chronic low back pain  CT head, cervical spine and lumbar spine negative for acute fracture or findings  With chronic history of headaches, reports back pain is currently managed  Plan: Continue lidocaine patch x2 to back on in a.m. off in p.m,. Continue tylenol 650 mg TID and once daily PRN for pain.  Monitor headache and back pain.     Orthostatic hypotension  Essential hypertension  History of Tachycardia  Required IV hydration inpatient   Continues to have occasional dizziness/lightheadedness with positional changes though reports better than inpatient  SBP controlled mainly 120-140, occasional 150, HR 60-80s  Plan: Continue metoprolol ER 25 mg daily. Encourage hydration. Monitor heart rate and blood pressure. SBP goal <150 due to orthostasis. Abdominal binder on in AM, off in PM. Consider adding compression stockings if ongoing symptoms.     Mild to moderate carotid bifurcation stenosis per CTA neck on 12/30/2023  Mild stenosis at left vertebral artery origin per CTA neck on 12/30/2023  Dyslipidemia  Plan: Continue aspirin 81 mg daily. Not currently on statin- per care everywhere looks like she has an allergy to it. Follow up with PCP to discuss medical management.     Type 2 diabetes  Hemoglobin A1c 8.6% on 12/30/2023  -189  Plan: Continue glimepiride 1 mg daily. Hold if BS<100.  Monitor blood sugars 3 times daily. Continue carb controlled diet. Sent in new glucometer and supplies per patient request at discharge.     Depression  Insomnia  Grief- patient lost spouse in 7/2023  PTA benadryl changed to  melatonin inpatient, melatonin is effective  ACP referral was placed 1/8 per her request when meeting with  due to recent loss of her spouse and grief  Plan: Continue escitalopram 10 mg daily. Continue melatonin 5 mg scheduled at bedtime.  Monitor mood and symptoms.  Monitor sleep.  ACP followed in the TCU. Follow up with PCP outpatient     Slow transit constipation  With ongoing constipation  Plan: Increase miralax 17 gram BID  and continue daily PRN. Continue senna S2 1 tab BID and 1 tab twice daily PRN.  Discussed today backing off if is moving too well.  Monitor bowels closely.     IDA  Not currently using CPAP, tells me she is currently in the process of working with sleep clinic to have this reevaluated   Plan: Monitor oxygen saturations PRN while sleeping. Follow up with sleep clinic outpatient.      Cognitive impairment  SLUMS 22/30, CPT 5/5.6  Plan: Based on cognitive testing patient lives alone with weekly check-in for safety and to monitor for problem-solving.  Patient lives in an independent living facility apartment alone and receives assistance with cleaning, patient demonstrates ability to sequence through self-care tasks.  Recommend assistance with weekly medication set up and finance management, which patient reports her daughter can provide, patient reports she is still driving short distances and is open for driving evaluation in the future.     Physical deconditioning  Secondary to fall, recent hospitalization, medical conditions as above  Completed course of therapy in the TCU  Plan: Continue therapy through home care     Discharge Medications:  MED REC REQUIRED  Post Medication Reconciliation Status:  Discharge medications reconciled and changed, see notes/orders     Current Outpatient Medications   Medication Sig Dispense Refill    acetaminophen (TYLENOL) 325 MG tablet Take 2 tablets (650 mg) by mouth 3 times daily And once daily PRN      aspirin 81 MG EC tablet Take 81 mg by  "mouth daily      blood glucose (NO BRAND SPECIFIED) test strip Use to test blood sugar 2 times daily or as directed. To accompany: Blood Glucose Monitor Brands: per insurance. 100 strip 6    blood glucose monitoring (NO BRAND SPECIFIED) meter device kit Use to test blood sugar 2 times daily or as directed. Preferred blood glucose meter OR supplies to accompany: Blood Glucose Monitor Brands: per insurance. 1 kit 0    calcium-magnesium (CALMAG) 500-250 MG TABS Take 1 tablet by mouth daily.      escitalopram (LEXAPRO) 10 MG tablet Take 10 mg by mouth daily      glimepiride (AMARYL) 1 MG tablet Take 1 mg by mouth every morning (before breakfast) Hold if BS<100 or if not eating      Lidocaine (LIDOCARE) 4 % Patch Place 2 patches onto the skin every 24 hours To prevent lidocaine toxicity, patient should be patch free for 12 hrs daily.      Melatonin 10 MG SUBL Place 5 mg under the tongue at bedtime      metoprolol succinate ER (TOPROL XL) 25 MG 24 hr tablet Take 25 mg by mouth daily      Multiple Vitamin (MULTI-VITAMIN) per tablet Take 1 tablet by mouth daily.      polyethylene glycol (MIRALAX) 17 GM/Dose powder Take 17 g by mouth 2 times daily      SENNA-docusate sodium (SENNA S) 8.6-50 MG tablet Take 1 tablet by mouth 2 times daily And 1 tab BID PRN      thin (NO BRAND SPECIFIED) lancets Use with lanceting device. To accompany: Blood Glucose Monitor Brands: per insurance. 100 each 6    Vitamin D3 (CHOLECALCIFEROL) 25 mcg (1000 units) tablet Take 1,000 Units by mouth daily        Past Medical History:   Past Medical History:   Diagnosis Date    Back pain     Hyperlipidemia     Migraines      Physical Exam:   Vitals: BP (!) 152/88   Pulse 87   Temp 96.8  F (36  C)   Resp 16   Ht 1.651 m (5' 5\")   Wt 74.8 kg (165 lb)   SpO2 95%   BMI 27.46 kg/m    BMI: Body mass index is 27.46 kg/m .  GENERAL APPEARANCE:  Alert, in NAD  HEENT: normocephalic, moist mucous membranes, nose without drainage or crusting  RESP:  " respiratory effort normal, no respiratory distress, Lung sounds clear, patient is on RA  CV: auscultation of heart done, rate and rhythm regular.  ABDOMEN: + bowel sounds, soft, nontender, no grimacing or guarding with palpation.  M/S: no lower extremity edema  NEURO: cranial nerves 2-12 grossly intact and at patient's baseline; moves extremities freely  PSYCH:  affect and mood normal      SNF labs: Labs done in SNF are in [facility]. Please refer to them using [facility]/Care Everywhere. and Recent labs in [facility] reviewed by me today.     DISCHARGE PLAN:  Medical Follow Up:     Follow up with primary care provider in 1 week  Follow up with specialists per previous recommendations outpatient  Discharge Services: Home Care:  Occupational Therapy, Physical Therapy, Registered Nurse, Home Health Aide, and From:  [facility]  Discharge Instructions:   Monitor blood glucose monitoring 3 times a day if your appetite declines with COVID infection, otherwise ok to check BID -in the morning before breakfast and before bedtime. Keep a record and bring it to your next primary provider visit.   Continue to follow your diet:  Carbohydrate Controlled Diet.   Abdominal binding on in AM, off in PM    TOTAL DISCHARGE TIME:   Greater than 30 minutes  Electronically signed by:  KATIE Chinchilla CNP     Home care Face to Face documentation done in [facility] attached to Home care orders for [facility].

## 2024-01-11 ENCOUNTER — TRANSITIONAL CARE UNIT VISIT (OUTPATIENT)
Dept: GERIATRICS | Facility: CLINIC | Age: 84
End: 2024-01-11
Payer: COMMERCIAL

## 2024-01-11 DIAGNOSIS — M54.50 BILATERAL LOW BACK PAIN WITHOUT SCIATICA, UNSPECIFIED CHRONICITY: ICD-10-CM

## 2024-01-11 DIAGNOSIS — U07.1 INFECTION DUE TO 2019 NOVEL CORONAVIRUS: Primary | ICD-10-CM

## 2024-01-11 DIAGNOSIS — G47.33 OSA (OBSTRUCTIVE SLEEP APNEA): ICD-10-CM

## 2024-01-11 DIAGNOSIS — R41.89 COGNITIVE IMPAIRMENT: ICD-10-CM

## 2024-01-11 DIAGNOSIS — I10 ESSENTIAL HYPERTENSION: ICD-10-CM

## 2024-01-11 DIAGNOSIS — F43.21 GRIEF: ICD-10-CM

## 2024-01-11 DIAGNOSIS — R53.81 PHYSICAL DECONDITIONING: ICD-10-CM

## 2024-01-11 DIAGNOSIS — F33.9 DEPRESSION, RECURRENT (H): ICD-10-CM

## 2024-01-11 DIAGNOSIS — E78.5 DYSLIPIDEMIA: ICD-10-CM

## 2024-01-11 DIAGNOSIS — G47.00 INSOMNIA, UNSPECIFIED TYPE: ICD-10-CM

## 2024-01-11 DIAGNOSIS — I95.1 ORTHOSTATIC HYPOTENSION: ICD-10-CM

## 2024-01-11 DIAGNOSIS — Z87.898 HISTORY OF HEADACHE: ICD-10-CM

## 2024-01-11 DIAGNOSIS — K59.01 SLOW TRANSIT CONSTIPATION: ICD-10-CM

## 2024-01-11 DIAGNOSIS — E11.9 TYPE 2 DIABETES MELLITUS WITHOUT COMPLICATION, WITHOUT LONG-TERM CURRENT USE OF INSULIN (H): ICD-10-CM
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
## 2024-01-11 DIAGNOSIS — Z87.898 HISTORY OF TACHYCARDIA: ICD-10-CM

## 2024-01-11 DIAGNOSIS — I65.23 BILATERAL CAROTID ARTERY STENOSIS: ICD-10-CM

## 2024-01-11 PROCEDURE — 99316 NF DSCHRG MGMT 30 MIN+: CPT | Performed by: NURSE PRACTITIONER

## 2024-01-11 RX ORDER — POLYETHYLENE GLYCOL 3350 17 G/17G
17 POWDER, FOR SOLUTION ORAL 2 TIMES DAILY
Start: 2024-01-11 | End: 2024-07-16

## 2024-01-11 RX ORDER — LANCETS
EACH MISCELLANEOUS
Qty: 100 EACH | Refills: 6 | Status: SHIPPED | OUTPATIENT
Start: 2024-01-11

## 2024-01-11 RX ORDER — SENNA AND DOCUSATE SODIUM 50; 8.6 MG/1; MG/1
1 TABLET, FILM COATED ORAL 2 TIMES DAILY
Start: 2024-01-11 | End: 2024-07-16

## 2024-01-11 RX ORDER — POLYETHYLENE GLYCOL 3350 17 G/17G
17 POWDER, FOR SOLUTION ORAL 2 TIMES DAILY
Start: 2024-01-11 | End: 2024-01-11

## 2024-01-11 NOTE — PATIENT INSTRUCTIONS
[facility] Discharge Orders    Name: Meredith Allen  : 1940  Planned Discharge Date: 2024  Discharged to: [facility]    MEDICAL FOLLOW UP  Follow up with primary care provider in 1 week  Follow up with specialists per previous recommendations outpatient      ORDER CHANGES:  In the hospital they discontinued benadryl and started on melatonin  Started on bowel medication in the TCU for constipation, if you develop loose stools back off on them  Started on lidocaine patch for pain management     DISCHARGE MEDICATIONS:  The patient s pharmacy is authorized to dispense a 30-day supply of medications. Refill requests should be directed to the primary provider, Lia Saini  Current Outpatient Medications   Medication Sig Dispense Refill    acetaminophen (TYLENOL) 325 MG tablet Take 2 tablets (650 mg) by mouth 3 times daily And once daily PRN      aspirin 81 MG EC tablet Take 81 mg by mouth daily      blood glucose (NO BRAND SPECIFIED) test strip Use to test blood sugar 2 times daily or as directed. To accompany: Blood Glucose Monitor Brands: per insurance. 100 strip 6    blood glucose monitoring (NO BRAND SPECIFIED) meter device kit Use to test blood sugar 2 times daily or as directed. Preferred blood glucose meter OR supplies to accompany: Blood Glucose Monitor Brands: per insurance. 1 kit 0    calcium-magnesium (CALMAG) 500-250 MG TABS Take 1 tablet by mouth daily.      escitalopram (LEXAPRO) 10 MG tablet Take 10 mg by mouth daily      glimepiride (AMARYL) 1 MG tablet Take 1 mg by mouth every morning (before breakfast) Hold if BS<100 or if not eating      Lidocaine (LIDOCARE) 4 % Patch Place 2 patches onto the skin every 24 hours To prevent lidocaine toxicity, patient should be patch free for 12 hrs daily.      Melatonin 10 MG SUBL Place 5 mg under the tongue at bedtime      metoprolol succinate ER (TOPROL XL) 25 MG 24 hr tablet Take 25 mg by mouth daily      Multiple Vitamin  (MULTI-VITAMIN) per tablet Take 1 tablet by mouth daily.      polyethylene glycol (MIRALAX) 17 GM/Dose powder Take 17 g by mouth 2 times daily      SENNA-docusate sodium (SENNA S) 8.6-50 MG tablet Take 1 tablet by mouth 2 times daily And 1 tab BID PRN      thin (NO BRAND SPECIFIED) lancets Use with lanceting device. To accompany: Blood Glucose Monitor Brands: per insurance. 100 each 6    Vitamin D3 (CHOLECALCIFEROL) 25 mcg (1000 units) tablet Take 1,000 Units by mouth daily         SERVICES:  Home Care:  Occupational Therapy, Physical Therapy, Registered Nurse, Home Health Aide, and From:  [facility]    ADDITIONAL INSTRUCTIONS:  Monitor blood glucose monitoring 3 times a day if your appetite declines with COVID infection, otherwise ok to check BID -in the morning before breakfast and before bedtime. Keep a record and bring it to your next primary provider visit.   Continue to follow your diet:  Carbohydrate Controlled Diet.   Abdominal binding on in AM, off in PM    KATIE Chinchilla CNP  This document was electronically signed on January 11, 2024

## 2024-01-11 NOTE — LETTER
1/11/2024        RE: Meredith Allen  [address]  [place] [address]        [facility] GERIATRICS DISCHARGE SUMMARY  PATIENT'S NAME: Meredith Allen  YOB: 1940  MEDICAL RECORD NUMBER:  [id number]  Place of Service where encounter took place:  [facility]  ([facility]) [[id number]]    PRIMARY CARE PROVIDER AND CLINIC RESPONSIBLE AFTER TRANSFER:   Lia Saini MD, [address] / [place] [address]    Non-[facility] Provider     Transferring providers: Hali Dinero, KATIE EDGE, Melissa Elizabeth MD    Recent Hospitalization/ED:  [facility] Hospital stay 12/30/2024 to 1/2/2024.  Date of SNF Admission: January 02, 2024  Date of SNF (anticipated) Discharge: January 11, 2024-patient choosing to discharge 1 day earlier than insurance driven discharge  Discharged to: [facility]  Cognitive Scores: SLUMS 22/30, CPT 5/5.6  Physical Function: Set up for eating, grooming/hygiene and upper body dressing, contact-guard assist for lower body dressing, toileting and showering, transfers contact-guard to standby assist with front wheel walker, bed mobility contact-guard assist to standby assist, ambulating 150 feet with front wheel walker      CODE STATUS/ADVANCE DIRECTIVES DISCUSSION:  Full code  ALLERGIES: Sulfa antibiotics    NURSING FACILITY COURSE   Medication Changes/Rationale:   In the hospital they discontinued benadryl and started on melatonin  Started on bowel medication in the TCU for constipation, if you develop loose stools back off on them  Started on lidocaine patch for pain management    PMH significant for HTN, dyslipidemia, tachycardia, BPPV, type 2 diabetes, migraine, depression, IDA.     Summary of recent hospitalization:  Patient was hospitalized at [facility] from 12/30/2024 to 1/2/2024 for fall.  Patient presented to the emergency department for evaluation after an unwitnessed fall, patient does not recall falling or  loss of consciousness, not aware how long she was on the floor, but presented with hematoma above left eye and pain in the back of her head.  Patient also with emesis and required multiple doses of antiemetics to control the vomiting.  Laboratory evaluation significant for high-sensitivity troponin 15, WBC 15.6, glucose 252.  UA with glucose 300, ketones 20, otherwise negative for infection, blood culture x 2 negative.  CTA head and neck shows normal CTA [place], mild to moderate carotid bifurcation stenosis and mild stenosis as left vertebral artery origin, negative for dissection.  CT cervical spine negative for acute fracture or posttraumatic subluxation.  Head CT negative for acute intracranial process, superficial soft tissue swelling.  CT lumbar spine negative for fracture or posttraumatic subluxation, no high-grade spinal canal or neural foraminal stenosis.  CT abdomen and pelvis shows no acute traumatic findings in abdomen or pelvis, few areas of heterogeneous low-attenuation in both kidneys indeterminant.  Echo from 12/31/2023 shows LVEF 60 to 65%, no regional wall motion abnormalities, grade 1 diastolic dysfunction.  Required intermittent IV fluids for positive orthostatic vital signs.  PTA Benadryl at bedtime changed to melatonin. Discharged to TCU for physical rehabilitation and medical management.     Summary of nursing facility stay:   Patient was seen today for discharge evaluation in the TCU, patient requesting to discharge 1 day early.  She tells me she is feeling great, she has no shortness of breath, cough, dizziness/lightheadedness, reports her appetite is very good.  She continues to have constipation.  She reports that her back pain has been managed well, really likes the lidocaine patches.  She denies any dysuria/trouble urinating.  We discussed follow-up with PCP outpatient.  Also discussed home care orders that were placed today.    Specific problems addressed in the TCU  COVID  19  positive for COVID 19 via routine screening by PCR on 1/8/2024  Has received 3 COVID vaccines  She is in the contagious phase  Patient is asymptomatic, respiratory status is stable.  Plan: Provide supportive cares. Monitor symptoms. Continue single room isolation and transmission based requirements for 10 days from positive result.    Fall, suspected syncope  Soft tissue injury with ecchymosis, left Maller region extending over left lateral orbit wall  Headache with history of migraine headache  Acute on chronic low back pain  CT head, cervical spine and lumbar spine negative for acute fracture or findings  With chronic history of headaches, reports back pain is currently managed  Plan: Continue lidocaine patch x2 to back on in a.m. off in p.m,. Continue tylenol 650 mg TID and once daily PRN for pain.  Monitor headache and back pain.     Orthostatic hypotension  Essential hypertension  History of Tachycardia  Required IV hydration inpatient   Continues to have occasional dizziness/lightheadedness with positional changes though reports better than inpatient  SBP controlled mainly 120-140, occasional 150, HR 60-80s  Plan: Continue metoprolol ER 25 mg daily. Encourage hydration. Monitor heart rate and blood pressure. SBP goal <150 due to orthostasis. Abdominal binder on in AM, off in PM. Consider adding compression stockings if ongoing symptoms.     Mild to moderate carotid bifurcation stenosis per CTA neck on 12/30/2023  Mild stenosis at left vertebral artery origin per CTA neck on 12/30/2023  Dyslipidemia  Plan: Continue aspirin 81 mg daily. Not currently on statin- per care everywhere looks like she has an allergy to it. Follow up with PCP to discuss medical management.     Type 2 diabetes  Hemoglobin A1c 8.6% on 12/30/2023  -189  Plan: Continue glimepiride 1 mg daily. Hold if BS<100.  Monitor blood sugars 3 times daily. Continue carb controlled diet. Sent in new glucometer and supplies per patient request  at discharge.     Depression  Insomnia  Grief- patient lost spouse in 7/2023  PTA benadryl changed to melatonin inpatient, melatonin is effective  ACP referral was placed 1/8 per her request when meeting with  due to recent loss of her spouse and grief  Plan: Continue escitalopram 10 mg daily. Continue melatonin 5 mg scheduled at bedtime.  Monitor mood and symptoms.  Monitor sleep.  ACP followed in the TCU. Follow up with PCP outpatient     Slow transit constipation  With ongoing constipation  Plan: Increase miralax 17 gram BID  and continue daily PRN. Continue senna S2 1 tab BID and 1 tab twice daily PRN.  Discussed today backing off if is moving too well.  Monitor bowels closely.     IDA  Not currently using CPAP, tells me she is currently in the process of working with sleep clinic to have this reevaluated   Plan: Monitor oxygen saturations PRN while sleeping. Follow up with sleep clinic outpatient.      Cognitive impairment  SLUMS 22/30, CPT 5/5.6  Plan: Based on cognitive testing patient lives alone with weekly check-in for safety and to monitor for problem-solving.  Patient lives in an independent living facility apartment alone and receives assistance with cleaning, patient demonstrates ability to sequence through self-care tasks.  Recommend assistance with weekly medication set up and finance management, which patient reports her daughter can provide, patient reports she is still driving short distances and is open for driving evaluation in the future.     Physical deconditioning  Secondary to fall, recent hospitalization, medical conditions as above  Completed course of therapy in the TCU  Plan: Continue therapy through home care     Discharge Medications:  MED REC REQUIRED  Post Medication Reconciliation Status:  Discharge medications reconciled and changed, see notes/orders     Current Outpatient Medications   Medication Sig Dispense Refill     acetaminophen (TYLENOL) 325 MG tablet Take 2  "tablets (650 mg) by mouth 3 times daily And once daily PRN       aspirin 81 MG EC tablet Take 81 mg by mouth daily       blood glucose (NO BRAND SPECIFIED) test strip Use to test blood sugar 2 times daily or as directed. To accompany: Blood Glucose Monitor Brands: per insurance. 100 strip 6     blood glucose monitoring (NO BRAND SPECIFIED) meter device kit Use to test blood sugar 2 times daily or as directed. Preferred blood glucose meter OR supplies to accompany: Blood Glucose Monitor Brands: per insurance. 1 kit 0     calcium-magnesium (CALMAG) 500-250 MG TABS Take 1 tablet by mouth daily.       escitalopram (LEXAPRO) 10 MG tablet Take 10 mg by mouth daily       glimepiride (AMARYL) 1 MG tablet Take 1 mg by mouth every morning (before breakfast) Hold if BS<100 or if not eating       Lidocaine (LIDOCARE) 4 % Patch Place 2 patches onto the skin every 24 hours To prevent lidocaine toxicity, patient should be patch free for 12 hrs daily.       Melatonin 10 MG SUBL Place 5 mg under the tongue at bedtime       metoprolol succinate ER (TOPROL XL) 25 MG 24 hr tablet Take 25 mg by mouth daily       Multiple Vitamin (MULTI-VITAMIN) per tablet Take 1 tablet by mouth daily.       polyethylene glycol (MIRALAX) 17 GM/Dose powder Take 17 g by mouth 2 times daily       SENNA-docusate sodium (SENNA S) 8.6-50 MG tablet Take 1 tablet by mouth 2 times daily And 1 tab BID PRN       thin (NO BRAND SPECIFIED) lancets Use with lanceting device. To accompany: Blood Glucose Monitor Brands: per insurance. 100 each 6     Vitamin D3 (CHOLECALCIFEROL) 25 mcg (1000 units) tablet Take 1,000 Units by mouth daily        Past Medical History:   Past Medical History:   Diagnosis Date     Back pain      Hyperlipidemia      Migraines      Physical Exam:   Vitals: BP (!) 152/88   Pulse 87   Temp 96.8  F (36  C)   Resp 16   Ht 1.651 m (5' 5\")   Wt 74.8 kg (165 lb)   SpO2 95%   BMI 27.46 kg/m    BMI: Body mass index is 27.46 kg/m .  GENERAL " APPEARANCE:  Alert, in NAD  HEENT: normocephalic, moist mucous membranes, nose without drainage or crusting  RESP:  respiratory effort normal, no respiratory distress, Lung sounds clear, patient is on RA  CV: auscultation of heart done, rate and rhythm regular.  ABDOMEN: + bowel sounds, soft, nontender, no grimacing or guarding with palpation.  M/S: no lower extremity edema  NEURO: cranial nerves 2-12 grossly intact and at patient's baseline; moves extremities freely  PSYCH:  affect and mood normal      SNF labs: Labs done in SNF are in [facility]. Please refer to them using [facility] Everywhere. and Recent labs in [facility] reviewed by me today.     DISCHARGE PLAN:  Medical Follow Up:     Follow up with primary care provider in 1 week  Follow up with specialists per previous recommendations outpatient  Discharge Services: Home Care:  Occupational Therapy, Physical Therapy, Registered Nurse, Home Health Aide, and From:  [facility]  Discharge Instructions:   Monitor blood glucose monitoring 3 times a day if your appetite declines with COVID infection, otherwise ok to check BID -in the morning before breakfast and before bedtime. Keep a record and bring it to your next primary provider visit.   Continue to follow your diet:  Carbohydrate Controlled Diet.   Abdominal binding on in AM, off in PM    TOTAL DISCHARGE TIME:   Greater than 30 minutes  Electronically signed by:  KATIE Chinchilla CNP     Home care Face to Face documentation done in [facility] attached to Home care orders for [facility].               Sincerely,        KATIE Chinchilla CNP

## 2024-01-12 NOTE — PROGRESS NOTES
Code status changed to DNR/DNI per POLST completed in the TCU, updating orders in EPIC  KATIE Chinchilla CNP on 1/12/2024 at 8:22 AM

## 2024-07-06 ENCOUNTER — APPOINTMENT (OUTPATIENT)
Dept: CT IMAGING | Facility: CLINIC | Age: 84
End: 2024-07-06
Attending: EMERGENCY MEDICINE
Payer: COMMERCIAL

## 2024-07-06 ENCOUNTER — HOSPITAL ENCOUNTER (EMERGENCY)
Facility: CLINIC | Age: 84
Discharge: HOME | End: 2024-07-06
Attending: EMERGENCY MEDICINE | Admitting: EMERGENCY MEDICINE
Payer: COMMERCIAL

## 2024-07-06 VITALS
DIASTOLIC BLOOD PRESSURE: 88 MMHG | RESPIRATION RATE: 18 BRPM | WEIGHT: 170.64 LBS | SYSTOLIC BLOOD PRESSURE: 165 MMHG | BODY MASS INDEX: 27.42 KG/M2 | HEART RATE: 72 BPM | TEMPERATURE: 97.4 F | OXYGEN SATURATION: 99 % | HEIGHT: 66 IN

## 2024-07-06 DIAGNOSIS — N39.0 URINARY TRACT INFECTION WITHOUT HEMATURIA, SITE UNSPECIFIED: ICD-10-CM

## 2024-07-06 LAB
ALBUMIN UR-MCNC: NEGATIVE MG/DL
ANION GAP SERPL CALCULATED.3IONS-SCNC: 9 MMOL/L (ref 7–15)
APPEARANCE UR: CLEAR
BASOPHILS # BLD AUTO: 0.1 10E3/UL (ref 0–0.2)
BASOPHILS NFR BLD AUTO: 1 %
BILIRUB UR QL STRIP: NEGATIVE
BUN SERPL-MCNC: 21.1 MG/DL (ref 8–23)
CALCIUM SERPL-MCNC: 9 MG/DL (ref 8.8–10.2)
CHLORIDE SERPL-SCNC: 100 MMOL/L (ref 98–107)
COLOR UR AUTO: ABNORMAL
CREAT SERPL-MCNC: 0.73 MG/DL (ref 0.51–0.95)
CRP SERPL-MCNC: <3 MG/L
DEPRECATED HCO3 PLAS-SCNC: 26 MMOL/L (ref 22–29)
EGFRCR SERPLBLD CKD-EPI 2021: 81 ML/MIN/1.73M2
EOSINOPHIL # BLD AUTO: 0.2 10E3/UL (ref 0–0.7)
EOSINOPHIL NFR BLD AUTO: 2 %
ERYTHROCYTE [DISTWIDTH] IN BLOOD BY AUTOMATED COUNT: 13.2 % (ref 10–15)
ERYTHROCYTE [SEDIMENTATION RATE] IN BLOOD BY WESTERGREN METHOD: 12 MM/HR (ref 0–30)
GLUCOSE SERPL-MCNC: 158 MG/DL (ref 70–99)
GLUCOSE UR STRIP-MCNC: NEGATIVE MG/DL
HCT VFR BLD AUTO: 44.4 % (ref 35–47)
HGB BLD-MCNC: 14.3 G/DL (ref 11.7–15.7)
HGB UR QL STRIP: NEGATIVE
IMM GRANULOCYTES # BLD: 0 10E3/UL
IMM GRANULOCYTES NFR BLD: 0 %
KETONES UR STRIP-MCNC: NEGATIVE MG/DL
LEUKOCYTE ESTERASE UR QL STRIP: ABNORMAL
LYMPHOCYTES # BLD AUTO: 2.2 10E3/UL (ref 0.8–5.3)
LYMPHOCYTES NFR BLD AUTO: 32 %
MCH RBC QN AUTO: 29.5 PG (ref 26.5–33)
MCHC RBC AUTO-ENTMCNC: 32.2 G/DL (ref 31.5–36.5)
MCV RBC AUTO: 92 FL (ref 78–100)
MONOCYTES # BLD AUTO: 0.6 10E3/UL (ref 0–1.3)
MONOCYTES NFR BLD AUTO: 8 %
MUCOUS THREADS #/AREA URNS LPF: PRESENT /LPF
NEUTROPHILS # BLD AUTO: 3.9 10E3/UL (ref 1.6–8.3)
NEUTROPHILS NFR BLD AUTO: 57 %
NITRATE UR QL: POSITIVE
NRBC # BLD AUTO: 0 10E3/UL
NRBC BLD AUTO-RTO: 0 /100
PH UR STRIP: 6 [PH] (ref 5–7)
PLATELET # BLD AUTO: 240 10E3/UL (ref 150–450)
POTASSIUM SERPL-SCNC: 4.4 MMOL/L (ref 3.4–5.3)
RBC # BLD AUTO: 4.85 10E6/UL (ref 3.8–5.2)
RBC URINE: 1 /HPF
SODIUM SERPL-SCNC: 135 MMOL/L (ref 135–145)
SP GR UR STRIP: 1.01 (ref 1–1.03)
SQUAMOUS EPITHELIAL: <1 /HPF
UROBILINOGEN UR STRIP-MCNC: NORMAL MG/DL
WBC # BLD AUTO: 7 10E3/UL (ref 4–11)
WBC URINE: 42 /HPF

## 2024-07-06 PROCEDURE — 96365 THER/PROPH/DIAG IV INF INIT: CPT

## 2024-07-06 PROCEDURE — 250N000011 HC RX IP 250 OP 636: Performed by: EMERGENCY MEDICINE

## 2024-07-06 PROCEDURE — 85652 RBC SED RATE AUTOMATED: CPT | Performed by: EMERGENCY MEDICINE

## 2024-07-06 PROCEDURE — 96375 TX/PRO/DX INJ NEW DRUG ADDON: CPT

## 2024-07-06 PROCEDURE — 99285 EMERGENCY DEPT VISIT HI MDM: CPT | Mod: 25

## 2024-07-06 PROCEDURE — 74177 CT ABD & PELVIS W/CONTRAST: CPT

## 2024-07-06 PROCEDURE — 86140 C-REACTIVE PROTEIN: CPT | Performed by: EMERGENCY MEDICINE

## 2024-07-06 PROCEDURE — 36415 COLL VENOUS BLD VENIPUNCTURE: CPT | Performed by: EMERGENCY MEDICINE

## 2024-07-06 PROCEDURE — 80048 BASIC METABOLIC PNL TOTAL CA: CPT | Performed by: EMERGENCY MEDICINE

## 2024-07-06 PROCEDURE — 87186 SC STD MICRODIL/AGAR DIL: CPT | Performed by: EMERGENCY MEDICINE

## 2024-07-06 PROCEDURE — 85025 COMPLETE CBC W/AUTO DIFF WBC: CPT | Performed by: EMERGENCY MEDICINE

## 2024-07-06 PROCEDURE — 87086 URINE CULTURE/COLONY COUNT: CPT | Performed by: EMERGENCY MEDICINE

## 2024-07-06 PROCEDURE — 81001 URINALYSIS AUTO W/SCOPE: CPT | Performed by: EMERGENCY MEDICINE

## 2024-07-06 PROCEDURE — 72131 CT LUMBAR SPINE W/O DYE: CPT

## 2024-07-06 RX ORDER — KETOROLAC TROMETHAMINE 15 MG/ML
15 INJECTION, SOLUTION INTRAMUSCULAR; INTRAVENOUS ONCE
Status: COMPLETED | OUTPATIENT
Start: 2024-07-06 | End: 2024-07-06

## 2024-07-06 RX ORDER — CEFUROXIME AXETIL 500 MG/1
500 TABLET ORAL 2 TIMES DAILY
Qty: 14 TABLET | Refills: 0 | Status: SHIPPED | OUTPATIENT
Start: 2024-07-06 | End: 2024-07-13

## 2024-07-06 RX ORDER — IOPAMIDOL 755 MG/ML
500 INJECTION, SOLUTION INTRAVASCULAR ONCE
Status: COMPLETED | OUTPATIENT
Start: 2024-07-06 | End: 2024-07-06

## 2024-07-06 RX ORDER — CEFTRIAXONE 1 G/1
1 INJECTION, POWDER, FOR SOLUTION INTRAMUSCULAR; INTRAVENOUS ONCE
Status: COMPLETED | OUTPATIENT
Start: 2024-07-06 | End: 2024-07-06

## 2024-07-06 RX ADMIN — IOPAMIDOL 85 ML: 755 INJECTION, SOLUTION INTRAVENOUS at 04:27

## 2024-07-06 RX ADMIN — KETOROLAC TROMETHAMINE 15 MG: 15 INJECTION, SOLUTION INTRAMUSCULAR; INTRAVENOUS at 03:43

## 2024-07-06 RX ADMIN — CEFTRIAXONE 1 G: 1 INJECTION, POWDER, FOR SOLUTION INTRAMUSCULAR; INTRAVENOUS at 05:19

## 2024-07-06 ASSESSMENT — ACTIVITIES OF DAILY LIVING (ADL)
ADLS_ACUITY_SCORE: 38

## 2024-07-06 ASSESSMENT — COLUMBIA-SUICIDE SEVERITY RATING SCALE - C-SSRS
6. HAVE YOU EVER DONE ANYTHING, STARTED TO DO ANYTHING, OR PREPARED TO DO ANYTHING TO END YOUR LIFE?: NO
2. HAVE YOU ACTUALLY HAD ANY THOUGHTS OF KILLING YOURSELF IN THE PAST MONTH?: NO
6. HAVE YOU EVER DONE ANYTHING, STARTED TO DO ANYTHING, OR PREPARED TO DO ANYTHING TO END YOUR LIFE?: NO
1. IN THE PAST MONTH, HAVE YOU WISHED YOU WERE DEAD OR WISHED YOU COULD GO TO SLEEP AND NOT WAKE UP?: YES
1. IN THE PAST MONTH, HAVE YOU WISHED YOU WERE DEAD OR WISHED YOU COULD GO TO SLEEP AND NOT WAKE UP?: NO
2. HAVE YOU ACTUALLY HAD ANY THOUGHTS OF KILLING YOURSELF IN THE PAST MONTH?: NO

## 2024-07-06 NOTE — ED TRIAGE NOTES
"Here for non-traumatic left hip which started hurting on Tuesday and has gotten worse with time. Has been taking tylenol without relief. Last Tylenol 0130. Uses walker at baseline. Denies numbness/tingling to leg.     Of note patient states, \" I'm not suicidal but I wanted to end it all with this pain. That's why I'm here.\"        "

## 2024-07-06 NOTE — ED PROVIDER NOTES
Emergency Department Note      History of Present Illness     Chief Complaint   Hip Pain      HPI   Meredith Allen is a 83 year old female with a history of hyperlipidemia, hypertension, and type II diabetes mellitus for evaluation of back and flank pain. The patient reports left sided back and flank pain that radiates down her left leg which began 2 days ago. She describes it as an aching pain and reports that it has made it difficult to sleep or lie down. The patient notes that she had a fall 12/30/23 where she was knocked unconsciousness and spent 3 days in the hospital, but denies any recent trauma or falls since then. She states that she has a long prior history of back pain and has been having issues with her CPAP machine, contributing to her difficulty sleeping. She denies any fever, chills, urinary symptoms, groin pain, or history of arthritis. Patient drove herself to the ED today.    Independent Historian   None    Review of External Notes   None    Past Medical History     Medical History and Problem List   Hyperlipidemia  Migraines  Type II diabetes  Depression  Hypertension  Tinea unguium  Benign paroxysmal vertigo  Hyperglycemia  Sleep apnea    Medications   Aspirin  Lexapro  Amaryl  Toprol  Miralax  Senna  Lancets    Surgical History   Cholecystectomy  Hysterectomy  Repair Bladder  Phacoemulsification clear cornea with standard intraocular lens implant  Ovarian cyst removal  Salpingo-oophorectomy  Bunionectomy    Physical Exam     Patient Vitals for the past 24 hrs:   BP Temp Pulse Resp SpO2 Height Weight   07/06/24 0518 -- -- -- -- 98 % -- --   07/06/24 0503 (!) 175/84 -- -- -- -- -- --   07/06/24 0448 -- -- -- -- 99 % -- --   07/06/24 0418 (!) 171/82 -- -- -- 97 % -- --   07/06/24 0403 (!) 155/124 -- 72 -- 97 % -- --   07/06/24 0356 -- -- -- -- 97 % -- --   07/06/24 0348 -- -- -- -- 97 % -- --   07/06/24 0333 -- -- -- -- 98 % -- --   07/06/24 0326 -- -- -- -- 99 % -- --   07/06/24 0309 (!) 168/104  "97.4  F (36.3  C) 74 18 99 % 1.664 m (5' 5.5\") 77.4 kg (170 lb 10.2 oz)     Physical Exam  General: Patient is awake, alert and interactive when I enter the room. Appears uncomfortable .   Head: The scalp, face, and head appear normal. Atraumatic.   Neck: Normal range of motion. No anterior cervical lymphadenopathy noted  CV: Regular rate. S1/S2. No murmurs.   Resp: Lungs are clear without wheezes or rales. No respiratory distress.     MS: Normal tone. Joints grossly normal without effusions. No asymmetric leg swelling, calf or thigh tenderness. Sensation is intact in the legs and pelvis including the lower sacral nerve roots.  They are tender in the left flank area.  There is no significant paraspinal muscle spasm.  There is no redness or edema about the back.  No midline spinal pain and no stepoffs. Detailed strength exam is performed in lower extremities, there is symmetrical strength in all myotomes tested both proximally and distally.   Skin: No rash or lesions noted. Normal capillary refill noted  Neuro: Speech is normal and fluent. Face is symmetric. Moving all extremities.   Psych:  Normal affect.  Appropriate interactions.    Diagnostics     Lab Results   Labs Ordered and Resulted from Time of ED Arrival to Time of ED Departure   ROUTINE UA WITH MICROSCOPIC REFLEX TO CULTURE - Abnormal       Result Value    Color Urine Light Yellow      Appearance Urine Clear      Glucose Urine Negative      Bilirubin Urine Negative      Ketones Urine Negative      Specific Gravity Urine 1.014      Blood Urine Negative      pH Urine 6.0      Protein Albumin Urine Negative      Urobilinogen Urine Normal      Nitrite Urine Positive (*)     Leukocyte Esterase Urine Large (*)     Mucus Urine Present (*)     RBC Urine 1      WBC Urine 42 (*)     Squamous Epithelials Urine <1     BASIC METABOLIC PANEL - Abnormal    Sodium 135      Potassium 4.4      Chloride 100      Carbon Dioxide (CO2) 26      Anion Gap 9      Urea Nitrogen " 21.1      Creatinine 0.73      GFR Estimate 81      Calcium 9.0      Glucose 158 (*)    CRP INFLAMMATION - Normal    CRP Inflammation <3.00     ERYTHROCYTE SEDIMENTATION RATE AUTO - Normal    Erythrocyte Sedimentation Rate 12     CBC WITH PLATELETS AND DIFFERENTIAL    WBC Count 7.0      RBC Count 4.85      Hemoglobin 14.3      Hematocrit 44.4      MCV 92      MCH 29.5      MCHC 32.2      RDW 13.2      Platelet Count 240      % Neutrophils 57      % Lymphocytes 32      % Monocytes 8      % Eosinophils 2      % Basophils 1      % Immature Granulocytes 0      NRBCs per 100 WBC 0      Absolute Neutrophils 3.9      Absolute Lymphocytes 2.2      Absolute Monocytes 0.6      Absolute Eosinophils 0.2      Absolute Basophils 0.1      Absolute Immature Granulocytes 0.0      Absolute NRBCs 0.0     URINE CULTURE       Imaging   CT Lumbar Spine w/o Contrast   Final Result   IMPRESSION:   1.  No acute lumbar spine fracture.            CT Abdomen Pelvis w Contrast   Final Result   IMPRESSION:    1.  No acute findings in the abdomen or pelvis.      2.  Nonobstructing left renal calculi.        ED Course      Medications Administered   Medications   cefTRIAXone (ROCEPHIN) 1 g vial to attach to  mL bag for ADULTS or NS 50 mL bag for PEDS (1 g Intravenous $New Bag 7/6/24 0519)   ketorolac (TORADOL) injection 15 mg (15 mg Intravenous $Given 7/6/24 4453)   sodium chloride (PF) 0.9% PF flush 100 mL (61 mLs Intravenous $Given 7/6/24 0427)   iopamidol (ISOVUE-370) solution 500 mL (85 mLs Intravenous $Given 7/6/24 0427)       Procedures   Procedures       ED Course   ED Course as of 07/06/24 0537   Sat Jul 06, 2024   0320 I initially assessed the patient and obtained the above history and physical exam.         Medical Decision Making / Diagnosis     CARMELINA   Meredith Allen is a 83 year old female who presents to the emergency department with left flank and back pain.  She denies any significant trauma.  No history of significant  arthritis.  She has been getting up in the melanite to go to the bathroom more often but denies any urinary pain or fever.  On physical exam she does not have any weakness in her lower extremity.  No significant saddle anesthesia.  CT of the lumbar spine was negative for any significant abnormality.  CT of the abdomen pelvis likewise was negative for any acute pathology.  Patient was ultimately found to have evidence of a urinary tract infection.  Will treat with antibiotics.  No signs of significant sepsis or systemic infection at this juncture.  Will have her follow-up closely with her primary care doctor and return to the emergency department any new or worsening symptoms.  She certainly also may be having some radicular back pain leading to some pain in her lower back that goes down her left leg.  However she has been ambulatory without significant difficulty here.  I do believe that she is safe for discharge home.    Disposition   The patient was discharged.     Diagnosis     ICD-10-CM    1. Urinary tract infection without hematuria, site unspecified  N39.0            Discharge Medications   New Prescriptions    CEFUROXIME (CEFTIN) 500 MG TABLET    Take 1 tablet (500 mg) by mouth 2 times daily for 7 days         Scribe Disclosure:  Sacha RAMOS, am serving as a scribe at 3:15 AM on 7/6/2024 to document services personally performed by Natan Salas MD based on my observations and the provider's statements to me.        Natan Salas MD  07/06/24 0035

## 2024-07-08 ENCOUNTER — TELEPHONE (OUTPATIENT)
Dept: NURSING | Facility: CLINIC | Age: 84
End: 2024-07-08
Payer: COMMERCIAL

## 2024-07-08 LAB — BACTERIA UR CULT: ABNORMAL

## 2024-07-08 NOTE — TELEPHONE ENCOUNTER
[facility]    Reason for call: Lab Result Notification     Lab Result (including Rx patient on, if applicable).  If culture, copy of lab report at bottom.  Lab Result: Urine Culture - See Below    ED Rx: cefuroxime (CEFTIN) 500 MG tablet - Take 1 tablet (500 mg) by mouth 2 times daily for 7 days (SUSCEPTIBLE)    Creatinine Level (mg/dl)   Creatinine   Date Value Ref Range Status   07/06/2024 0.73 0.51 - 0.95 mg/dL Final   07/31/2012 0.65 0.52 - 1.04 mg/dL Final    Creatinine clearance (ml/min), if applicable    Serum creatinine: 0.73 mg/dL 07/06/24 0342  Estimated creatinine clearance: 60.7 mL/min     RN Recommendations/Instructions per [place] ED lab result protocol:   [facility] ED lab result protocol utilized: Urine Culture      Patient's current Symptoms:   Patient states that she is on her way to the ED now. She states that she is having terrible leg pain. Advised patient to continue to the ED.          KENAN BERNAL RN   WDL

## 2024-07-16 ENCOUNTER — HOSPITAL ENCOUNTER (OUTPATIENT)
Facility: CLINIC | Age: 84
Setting detail: OBSERVATION
Discharge: NURSING, REHAB OR LONG-TERM CARE | End: 2024-07-18
Attending: EMERGENCY MEDICINE | Admitting: STUDENT IN AN ORGANIZED HEALTH CARE EDUCATION/TRAINING PROGRAM
Payer: COMMERCIAL

## 2024-07-16 ENCOUNTER — APPOINTMENT (OUTPATIENT)
Dept: GENERAL RADIOLOGY | Facility: CLINIC | Age: 84
End: 2024-07-16
Attending: PHYSICIAN ASSISTANT
Payer: COMMERCIAL

## 2024-07-16 ENCOUNTER — APPOINTMENT (OUTPATIENT)
Dept: CT IMAGING | Facility: CLINIC | Age: 84
End: 2024-07-16
Attending: EMERGENCY MEDICINE
Payer: COMMERCIAL

## 2024-07-16 ENCOUNTER — APPOINTMENT (OUTPATIENT)
Dept: GENERAL RADIOLOGY | Facility: CLINIC | Age: 84
End: 2024-07-16
Attending: EMERGENCY MEDICINE
Payer: COMMERCIAL

## 2024-07-16 ENCOUNTER — APPOINTMENT (OUTPATIENT)
Dept: PHYSICAL THERAPY | Facility: CLINIC | Age: 84
End: 2024-07-16
Attending: PHYSICIAN ASSISTANT
Payer: COMMERCIAL

## 2024-07-16 DIAGNOSIS — M54.42 ACUTE LEFT-SIDED LOW BACK PAIN WITH LEFT-SIDED SCIATICA: Primary | ICD-10-CM

## 2024-07-16 DIAGNOSIS — R42 LIGHTHEADEDNESS: ICD-10-CM

## 2024-07-16 DIAGNOSIS — I95.9 TRANSIENT HYPOTENSION: ICD-10-CM

## 2024-07-16 DIAGNOSIS — S09.90XA CLOSED HEAD INJURY, INITIAL ENCOUNTER: ICD-10-CM

## 2024-07-16 DIAGNOSIS — T42.6X1A: ICD-10-CM

## 2024-07-16 DIAGNOSIS — S06.0X0A CONCUSSION WITHOUT LOSS OF CONSCIOUSNESS, INITIAL ENCOUNTER: ICD-10-CM

## 2024-07-16 LAB
ABO/RH(D): NORMAL
ALBUMIN UR-MCNC: NEGATIVE MG/DL
ANION GAP SERPL CALCULATED.3IONS-SCNC: 11 MMOL/L (ref 7–15)
ANTIBODY SCREEN: NEGATIVE
APPEARANCE UR: CLEAR
ATRIAL RATE - MUSE: 74 BPM
B-OH-BUTYR SERPL-SCNC: <0.18 MMOL/L
BASOPHILS # BLD AUTO: 0.1 10E3/UL (ref 0–0.2)
BASOPHILS NFR BLD AUTO: 1 %
BILIRUB UR QL STRIP: NEGATIVE
BUN SERPL-MCNC: 29 MG/DL (ref 8–23)
CALCIUM SERPL-MCNC: 9.1 MG/DL (ref 8.8–10.2)
CHLORIDE SERPL-SCNC: 99 MMOL/L (ref 98–107)
COLOR UR AUTO: ABNORMAL
CREAT SERPL-MCNC: 0.83 MG/DL (ref 0.51–0.95)
DIASTOLIC BLOOD PRESSURE - MUSE: NORMAL MMHG
EGFRCR SERPLBLD CKD-EPI 2021: 70 ML/MIN/1.73M2
EOSINOPHIL # BLD AUTO: 0.1 10E3/UL (ref 0–0.7)
EOSINOPHIL NFR BLD AUTO: 1 %
ERYTHROCYTE [DISTWIDTH] IN BLOOD BY AUTOMATED COUNT: 13.4 % (ref 10–15)
FLUAV RNA SPEC QL NAA+PROBE: NEGATIVE
FLUBV RNA RESP QL NAA+PROBE: NEGATIVE
GLUCOSE BLDC GLUCOMTR-MCNC: 223 MG/DL (ref 70–99)
GLUCOSE SERPL-MCNC: 228 MG/DL (ref 70–99)
GLUCOSE UR STRIP-MCNC: 70 MG/DL
HCO3 SERPL-SCNC: 24 MMOL/L (ref 22–29)
HCT VFR BLD AUTO: 41.5 % (ref 35–47)
HGB BLD-MCNC: 13.7 G/DL (ref 11.7–15.7)
HGB UR QL STRIP: NEGATIVE
HOLD SPECIMEN: NORMAL
IMM GRANULOCYTES # BLD: 0 10E3/UL
IMM GRANULOCYTES NFR BLD: 0 %
INTERPRETATION ECG - MUSE: NORMAL
KETONES UR STRIP-MCNC: NEGATIVE MG/DL
LACTATE SERPL-SCNC: 1.8 MMOL/L (ref 0.7–2)
LEUKOCYTE ESTERASE UR QL STRIP: NEGATIVE
LYMPHOCYTES # BLD AUTO: 2.4 10E3/UL (ref 0.8–5.3)
LYMPHOCYTES NFR BLD AUTO: 23 %
MCH RBC QN AUTO: 29.7 PG (ref 26.5–33)
MCHC RBC AUTO-ENTMCNC: 33 G/DL (ref 31.5–36.5)
MCV RBC AUTO: 90 FL (ref 78–100)
MONOCYTES # BLD AUTO: 0.7 10E3/UL (ref 0–1.3)
MONOCYTES NFR BLD AUTO: 7 %
NEUTROPHILS # BLD AUTO: 7 10E3/UL (ref 1.6–8.3)
NEUTROPHILS NFR BLD AUTO: 68 %
NITRATE UR QL: NEGATIVE
NRBC # BLD AUTO: 0 10E3/UL
NRBC BLD AUTO-RTO: 0 /100
P AXIS - MUSE: 47 DEGREES
PH UR STRIP: 6.5 [PH] (ref 5–7)
PLATELET # BLD AUTO: 252 10E3/UL (ref 150–450)
POTASSIUM SERPL-SCNC: 4.6 MMOL/L (ref 3.4–5.3)
PR INTERVAL - MUSE: 188 MS
QRS DURATION - MUSE: 80 MS
QT - MUSE: 406 MS
QTC - MUSE: 450 MS
R AXIS - MUSE: 24 DEGREES
RBC # BLD AUTO: 4.62 10E6/UL (ref 3.8–5.2)
RBC URINE: <1 /HPF
RSV RNA SPEC NAA+PROBE: NEGATIVE
SARS-COV-2 RNA RESP QL NAA+PROBE: NEGATIVE
SODIUM SERPL-SCNC: 134 MMOL/L (ref 135–145)
SP GR UR STRIP: 1.01 (ref 1–1.03)
SPECIMEN EXPIRATION DATE: NORMAL
SYSTOLIC BLOOD PRESSURE - MUSE: NORMAL MMHG
T AXIS - MUSE: 53 DEGREES
UROBILINOGEN UR STRIP-MCNC: NORMAL MG/DL
VENTRICULAR RATE- MUSE: 74 BPM
WBC # BLD AUTO: 10.3 10E3/UL (ref 4–11)
WBC URINE: 2 /HPF

## 2024-07-16 PROCEDURE — 87637 SARSCOV2&INF A&B&RSV AMP PRB: CPT | Performed by: EMERGENCY MEDICINE

## 2024-07-16 PROCEDURE — 250N000013 HC RX MED GY IP 250 OP 250 PS 637: Performed by: PHYSICIAN ASSISTANT

## 2024-07-16 PROCEDURE — 70450 CT HEAD/BRAIN W/O DYE: CPT

## 2024-07-16 PROCEDURE — G0378 HOSPITAL OBSERVATION PER HR: HCPCS

## 2024-07-16 PROCEDURE — 86900 BLOOD TYPING SEROLOGIC ABO: CPT | Performed by: EMERGENCY MEDICINE

## 2024-07-16 PROCEDURE — 80048 BASIC METABOLIC PNL TOTAL CA: CPT | Performed by: EMERGENCY MEDICINE

## 2024-07-16 PROCEDURE — 82010 KETONE BODYS QUAN: CPT | Performed by: EMERGENCY MEDICINE

## 2024-07-16 PROCEDURE — 250N000011 HC RX IP 250 OP 636: Performed by: EMERGENCY MEDICINE

## 2024-07-16 PROCEDURE — 73522 X-RAY EXAM HIPS BI 3-4 VIEWS: CPT

## 2024-07-16 PROCEDURE — 83605 ASSAY OF LACTIC ACID: CPT | Performed by: EMERGENCY MEDICINE

## 2024-07-16 PROCEDURE — 72100 X-RAY EXAM L-S SPINE 2/3 VWS: CPT

## 2024-07-16 PROCEDURE — 85025 COMPLETE CBC W/AUTO DIFF WBC: CPT | Performed by: EMERGENCY MEDICINE

## 2024-07-16 PROCEDURE — 81001 URINALYSIS AUTO W/SCOPE: CPT | Performed by: EMERGENCY MEDICINE

## 2024-07-16 PROCEDURE — 71046 X-RAY EXAM CHEST 2 VIEWS: CPT

## 2024-07-16 PROCEDURE — 99223 1ST HOSP IP/OBS HIGH 75: CPT | Performed by: PHYSICIAN ASSISTANT

## 2024-07-16 PROCEDURE — 97161 PT EVAL LOW COMPLEX 20 MIN: CPT | Mod: GP | Performed by: PHYSICAL THERAPIST

## 2024-07-16 PROCEDURE — 96361 HYDRATE IV INFUSION ADD-ON: CPT

## 2024-07-16 PROCEDURE — 93005 ELECTROCARDIOGRAM TRACING: CPT

## 2024-07-16 PROCEDURE — 97530 THERAPEUTIC ACTIVITIES: CPT | Mod: GP | Performed by: PHYSICAL THERAPIST

## 2024-07-16 PROCEDURE — 36415 COLL VENOUS BLD VENIPUNCTURE: CPT | Performed by: EMERGENCY MEDICINE

## 2024-07-16 PROCEDURE — 82962 GLUCOSE BLOOD TEST: CPT

## 2024-07-16 PROCEDURE — 72125 CT NECK SPINE W/O DYE: CPT

## 2024-07-16 PROCEDURE — 258N000003 HC RX IP 258 OP 636: Performed by: EMERGENCY MEDICINE

## 2024-07-16 PROCEDURE — 97110 THERAPEUTIC EXERCISES: CPT | Mod: GP | Performed by: PHYSICAL THERAPIST

## 2024-07-16 PROCEDURE — 96374 THER/PROPH/DIAG INJ IV PUSH: CPT

## 2024-07-16 PROCEDURE — 99285 EMERGENCY DEPT VISIT HI MDM: CPT | Mod: 25

## 2024-07-16 RX ORDER — LIDOCAINE 4 G/G
1 PATCH TOPICAL DAILY PRN
COMMUNITY

## 2024-07-16 RX ORDER — AMOXICILLIN 250 MG
1 CAPSULE ORAL 2 TIMES DAILY PRN
Status: DISCONTINUED | OUTPATIENT
Start: 2024-07-16 | End: 2024-07-18 | Stop reason: HOSPADM

## 2024-07-16 RX ORDER — ONDANSETRON 2 MG/ML
4 INJECTION INTRAMUSCULAR; INTRAVENOUS EVERY 6 HOURS PRN
Status: DISCONTINUED | OUTPATIENT
Start: 2024-07-16 | End: 2024-07-18 | Stop reason: HOSPADM

## 2024-07-16 RX ORDER — ONDANSETRON 4 MG/1
4 TABLET, ORALLY DISINTEGRATING ORAL EVERY 6 HOURS PRN
Status: DISCONTINUED | OUTPATIENT
Start: 2024-07-16 | End: 2024-07-18 | Stop reason: HOSPADM

## 2024-07-16 RX ORDER — AMOXICILLIN 250 MG
2 CAPSULE ORAL 2 TIMES DAILY PRN
Status: DISCONTINUED | OUTPATIENT
Start: 2024-07-16 | End: 2024-07-18 | Stop reason: HOSPADM

## 2024-07-16 RX ORDER — LIDOCAINE 40 MG/G
CREAM TOPICAL
Status: DISCONTINUED | OUTPATIENT
Start: 2024-07-16 | End: 2024-07-18 | Stop reason: HOSPADM

## 2024-07-16 RX ORDER — ACETAMINOPHEN 325 MG/1
650 TABLET ORAL EVERY 4 HOURS PRN
Status: DISCONTINUED | OUTPATIENT
Start: 2024-07-16 | End: 2024-07-18 | Stop reason: HOSPADM

## 2024-07-16 RX ORDER — ACETAMINOPHEN 325 MG/1
650 TABLET ORAL EVERY 6 HOURS PRN
Status: ON HOLD | COMMUNITY
End: 2024-07-18

## 2024-07-16 RX ORDER — ONDANSETRON 2 MG/ML
4 INJECTION INTRAMUSCULAR; INTRAVENOUS ONCE
Status: COMPLETED | OUTPATIENT
Start: 2024-07-16 | End: 2024-07-16

## 2024-07-16 RX ORDER — GABAPENTIN 100 MG/1
100 CAPSULE ORAL 2 TIMES DAILY
Status: ON HOLD | COMMUNITY
End: 2024-07-18

## 2024-07-16 RX ORDER — MELOXICAM 7.5 MG/1
7.5 TABLET ORAL DAILY
Status: ON HOLD | COMMUNITY
End: 2024-07-18

## 2024-07-16 RX ORDER — ESCITALOPRAM OXALATE 10 MG/1
10 TABLET ORAL DAILY
Status: DISCONTINUED | OUTPATIENT
Start: 2024-07-16 | End: 2024-07-18 | Stop reason: HOSPADM

## 2024-07-16 RX ADMIN — ONDANSETRON 4 MG: 2 INJECTION INTRAMUSCULAR; INTRAVENOUS at 06:50

## 2024-07-16 RX ADMIN — SODIUM CHLORIDE 1000 ML: 9 INJECTION, SOLUTION INTRAVENOUS at 06:49

## 2024-07-16 RX ADMIN — ACETAMINOPHEN 650 MG: 325 TABLET, FILM COATED ORAL at 20:41

## 2024-07-16 RX ADMIN — SODIUM CHLORIDE 1000 ML: 9 INJECTION, SOLUTION INTRAVENOUS at 10:29

## 2024-07-16 RX ADMIN — ESCITALOPRAM OXALATE 10 MG: 10 TABLET ORAL at 15:44

## 2024-07-16 ASSESSMENT — ACTIVITIES OF DAILY LIVING (ADL)
ADLS_ACUITY_SCORE: 38
ADLS_ACUITY_SCORE: 26
ADLS_ACUITY_SCORE: 38
ADLS_ACUITY_SCORE: 28
ADLS_ACUITY_SCORE: 28
ADLS_ACUITY_SCORE: 38
ADLS_ACUITY_SCORE: 26
ADLS_ACUITY_SCORE: 38
ADLS_ACUITY_SCORE: 28
ADLS_ACUITY_SCORE: 26
ADLS_ACUITY_SCORE: 38
ADLS_ACUITY_SCORE: 26

## 2024-07-16 ASSESSMENT — COLUMBIA-SUICIDE SEVERITY RATING SCALE - C-SSRS
6. HAVE YOU EVER DONE ANYTHING, STARTED TO DO ANYTHING, OR PREPARED TO DO ANYTHING TO END YOUR LIFE?: NO
2. HAVE YOU ACTUALLY HAD ANY THOUGHTS OF KILLING YOURSELF IN THE PAST MONTH?: NO
1. IN THE PAST MONTH, HAVE YOU WISHED YOU WERE DEAD OR WISHED YOU COULD GO TO SLEEP AND NOT WAKE UP?: NO

## 2024-07-16 NOTE — PLAN OF CARE
ROOM # 213-2    Living Situation (if not independent, order SW consult): apartment  Facility name:  :     Activity level at baseline: Ind  Activity level on admit: Ax1    Who will be transporting you at discharge: TBD    Patient registered to observation; given Patient Bill of Rights; given the opportunity to ask questions about observation status and their plan of care.  Patient has been oriented to the observation room, bathroom and call light is in place.    Discussed discharge goals and expectations with patient/family.

## 2024-07-16 NOTE — ED PROVIDER NOTES
Emergency Department Note      History of Present Illness     Chief Complaint   Fall    HPI   Meredith Allen is a 83 year old female who presents for fall. Meredith reports that last night she took a 2nd dose of her prescribed gabapentin, which had recently been increased. This morning she tried to get up from bed and fell, hitting her head. She endorses intermittent dizziness. She denies recent illness, chest pain, abdominal pain, or neck soreness.     Independent Historian   None    Review of External Notes   None    Past Medical History     Medical History and Problem List   Back pain  Depression   Type 2 diabetes mellitus without complications  HTN  Hyperlipidemia  Hyperglycemia  Migraines  Sleep apnea  Tinea unguium  Benign paroxysmal vertigo, bilateral    Medications   Aspirin 81  Escitalopram  Gabapentin  Glimepiride   Meloxicam  Metoprolol succinate ER    Surgical History   Bunionectomy  Cholecystectomy  Colonoscopy   Cystectomy ovarian benign  Hysterectomy total abdominal, repair bladder, combined  Phacoemulsification clear cornea with standard intraocular lens implant     Physical Exam     Patient Vitals for the past 24 hrs:   BP Temp Temp src Pulse Resp SpO2 Weight   07/16/24 0924 (!) 141/100 -- -- -- -- -- --   07/16/24 0909 (!) 149/75 -- -- 85 -- 90 % --   07/16/24 0854 (!) 148/78 -- -- -- -- -- --   07/16/24 0839 (!) 148/69 -- -- 81 -- 94 % --   07/16/24 0829 -- -- -- -- -- 93 % --   07/16/24 0814 137/75 -- -- 77 -- -- --   07/16/24 0733 (!) 144/69 -- -- 77 -- 93 % --   07/16/24 0731 -- -- -- -- -- 94 % --   07/16/24 0716 (!) 146/55 -- -- -- -- -- --   07/16/24 0702 135/59 -- -- 72 -- 95 % --   07/16/24 0638 (!) 82/37 -- -- -- -- -- --   07/16/24 0632 -- 97  F (36.1  C) Temporal 82 18 95 % --   07/16/24 0627 -- -- -- -- -- -- 77.1 kg (170 lb)     Physical Exam  Constitutional: Alert, attentive, GCS 15  HENT:    Nose: Nose normal.    Mouth/Throat: Oropharynx is clear, mucous membrane are dry   Head:  Contusion to left lateral parietal region  Neck: C-collar in place, no clear midline tenderness  Eyes: EOM are normal, anicteric, conjugate gaze  CV: regular rate and rhythm   Chest: Effort normal and breath sounds clear without wheezing or rales, symmetric bilaterally   GI:  non tender. No distension. No guarding or rebound.    MSK: No LE edema, no tenderness to palpation of BLE.  Neurological: Alert, attentive, moving all extremities equally.   Skin: Skin is warm and dry.      Diagnostics     Lab Results   Labs Ordered and Resulted from Time of ED Arrival to Time of ED Departure   BASIC METABOLIC PANEL - Abnormal       Result Value    Sodium 134 (*)     Potassium 4.6      Chloride 99      Carbon Dioxide (CO2) 24      Anion Gap 11      Urea Nitrogen 29.0 (*)     Creatinine 0.83      GFR Estimate 70      Calcium 9.1      Glucose 228 (*)    ROUTINE UA WITH MICROSCOPIC - Abnormal    Color Urine Light Yellow      Appearance Urine Clear      Glucose Urine 70 (*)     Bilirubin Urine Negative      Ketones Urine Negative      Specific Gravity Urine 1.013      Blood Urine Negative      pH Urine 6.5      Protein Albumin Urine Negative      Urobilinogen Urine Normal      Nitrite Urine Negative      Leukocyte Esterase Urine Negative      RBC Urine <1      WBC Urine 2     GLUCOSE BY METER - Abnormal    GLUCOSE BY METER POCT 223 (*)    LACTIC ACID WHOLE BLOOD - Normal    Lactic Acid 1.8     KETONE BETA-HYDROXYBUTYRATE QUANTITATIVE, RAPID - Normal    Ketone (Beta-Hydroxybutyrate) Quantitative <0.18     INFLUENZA A/B, RSV, & SARS-COV2 PCR - Normal    Influenza A PCR Negative      Influenza B PCR Negative      RSV PCR Negative      SARS CoV2 PCR Negative     CBC WITH PLATELETS AND DIFFERENTIAL    WBC Count 10.3      RBC Count 4.62      Hemoglobin 13.7      Hematocrit 41.5      MCV 90      MCH 29.7      MCHC 33.0      RDW 13.4      Platelet Count 252      % Neutrophils 68      % Lymphocytes 23      % Monocytes 7      % Eosinophils 1       % Basophils 1      % Immature Granulocytes 0      NRBCs per 100 WBC 0      Absolute Neutrophils 7.0      Absolute Lymphocytes 2.4      Absolute Monocytes 0.7      Absolute Eosinophils 0.1      Absolute Basophils 0.1      Absolute Immature Granulocytes 0.0      Absolute NRBCs 0.0     GLUCOSE MONITOR NURSING POCT   TYPE AND SCREEN, ADULT    ABO/RH(D) O POS      Antibody Screen Negative      SPECIMEN EXPIRATION DATE [id number]     ABO/RH TYPE AND SCREEN       Imaging   Chest XR,  PA & LAT   Final Result   IMPRESSION: No acute cardiopulmonary disease. No visible pneumothorax   or visible displaced fracture.      JEET VASQUEZ MD            SYSTEM ID:  [id number]      Head CT w/o contrast   Final Result   IMPRESSION:   1. No acute intracranial pathology. Small left parietal scalp   edema/hematoma.    2. Chronic small vessel ischemic disease and generalized cerebral   volume loss.      GRACIELA CORTEZ MD            SYSTEM ID:  [id number]      CT Cervical Spine w/o Contrast   Final Result   IMPRESSION:    1. No evidence of acute fracture or subluxation in the cervical spine.   2. Degenerative changes in the cervical spine as described above.      GRACIELA CORTEZ MD            SYSTEM ID:  [id number]          EKG   ECG results from 07/16/24   EKG 12 lead     Value    Systolic Blood Pressure     Diastolic Blood Pressure     Ventricular Rate 74    Atrial Rate 74    WY Interval 188    QRS Duration 80        QTc 450    P Axis 47    R AXIS 24    T Axis 53    Interpretation ECG      Sinus rhythm  Cannot rule out Anterior infarct , age undetermined  Abnormal ECG  When compared with ECG of 30-DEC-2023 18:05,  No significant changes    ECG interpreted by me at 703       Independent Interpretation   I personally read her head CT, see no evidence intracranial hemorrhage    ED Course      Medications Administered   Medications   sodium chloride 0.9% BOLUS 1,000 mL (1,000 mLs Intravenous $New Bag 7/16/24 1027)   sodium  chloride 0.9% BOLUS 1,000 mL (0 mLs Intravenous Stopped 7/16/24 1029)   ondansetron (ZOFRAN) injection 4 mg (4 mg Intravenous $Given 7/16/24 0650)       Procedures   Procedures     Discussion of Management   TAMIKO Melchor    ED Course   ED Course as of 07/16/24 1032   Tue Jul 16, 2024   0613 I evaluated the patient and obtained the history as noted above.      0700 I rechecked and updated the patient.     BP ~140/80   1000 I rechecked and updated the patient.         Optional/Additional Documentation  None    Medical Decision Making / Diagnosis     MDM   Meredith Allen is a 83 year old female past medical history seen for diabetes, hypertension, migraines who just went up on her gabapentin dosing the evening prior presenting for orthostasis having a near syncopal event when she stood up, it did cause her to fall hit her head.  She denies head or neck pain does have a contusion, CT imaging of head and neck are negative.  On arrival here, she was noted to be transiently hypotensive this was associated with nausea, I suspect likely high vagal tone, may be orthostasis.  She is denying any chest pain, chest pressure or shortness of breath, EKG shows no ischemic changes, low suspicion for PE as she has no DVT symptoms.   even prior to IV fluids, her blood pressure quickly bounced back into the normal range, I do not suspect this is trauma related, no indication for trauma activation given she did not remain persistently hypotensive.  She has no abdominal tenderness.  Low suspicion for aortic pathology.  Even prior to IV fluids, her blood pressure improved, however with ambulation she continued to feel lightheaded but was able to ambulate.  She is anxious to go home due to her symptoms that she lives alone she is worried about falling again.  Due to the fact that she took an extra dose of gabapentin, did hit her head and likely has mild concussion, doing is reasonable to watch her overnight to see if this improves.   We will plan for observation    Disposition   The patient was admitted to the hospital.     Diagnosis     ICD-10-CM    1. Lightheadedness  R42       2. Accidental overdose of gabapentin  T42.6X1A       3. Transient hypotension  I95.9       4. Closed head injury, initial encounter  S09.90XA       5. Concussion without loss of consciousness, initial encounter  S06.0X0A            Nirav Davis MD  [employer]  10:32 AM 07/16/24       Scribe Disclosure:  Sydnee RAMOS, am serving as a scribe at 7:06 AM on 7/16/2024 to document services personally performed by Nirav Davis MD based on my observations and the provider's statements to me.        Nirav Davis MD  07/16/24 1032

## 2024-07-16 NOTE — PROGRESS NOTES
07/16/24 1200   Appointment Info   Signing Clinician's Name / Credentials (PT) Maryanne Kidd PT   Living Environment   People in Home alone   Current Living Arrangements independent living facility   Home Accessibility no concerns   Self-Care   Usual Activity Tolerance good   Current Activity Tolerance fair   Equipment Currently Used at Home walker, rolling;grab bar, toilet;grab bar, tub/shower   Fall history within last six months yes   Number of times patient has fallen within last six months 1   General Information   Onset of Illness/Injury or Date of Surgery 07/16/24   Referring Physician Tatiana Mcghee PA-C   Existing Precautions/Restrictions fall   Cognition   Affect/Mental Status (Cognition) WFL   Orientation Status (Cognition) oriented x 3   Follows Commands (Cognition) WFL   Posture    Posture Forward head position;Protracted shoulders   Range of Motion (ROM)   Range of Motion ROM is WFL   Strength (Manual Muscle Testing)   Strength (Manual Muscle Testing) Deficits observed during functional mobility   Bed Mobility   Comment, (Bed Mobility) CGA sup to sit   Transfers   Comment, (Transfers) min A sit to stand w WW   Gait/Stairs (Locomotion)   Comment, (Gait/Stairs) min A w WW 10 feet short steps and dec pace   Balance   Balance Comments unsteadiness noted in standing and walking uses WW at home   Clinical Impression   Criteria for Skilled Therapeutic Intervention Yes, treatment indicated   PT Diagnosis (PT) difficulty walking   Influenced by the following impairments impaired gait dec indep transfers pain   Functional limitations due to impairments impaired mobility   Clinical Presentation (PT Evaluation Complexity) evolving   Clinical Decision Making (Complexity) low complexity   Planned Therapy Interventions (PT) balance training;bed mobility training;gait training;strengthening;transfer training   Risk & Benefits of therapy have been explained evaluation/treatment results reviewed;care  plan/treatment goals reviewed;risks/benefits reviewed   Therapy Certification   Start of care date 07/16/24   Certification date from 07/16/24   Certification date to 07/20/24   Medical Diagnosis Syncope   PT Discharge Planning   PT Discharge Recommendation (DC Rec) home with home care physical therapy;Transitional Care Facility   PT Rationale for DC Rec Pt mobilizing below her indep baseline req A x 1 for ambulation, demonstrating unsteadiness and weakness, will benefit from PT during stay, anticipate that with therapy and medical management pt will progress to SBA or less with mobility in order to return home however if she does not TCU will need to be discussed.     [facility]  OUTPATIENT PHYSICAL THERAPY EVALUATION  PLAN OF TREATMENT FOR OUTPATIENT REHABILITATION  (COMPLETE FOR INITIAL CLAIMS ONLY)  Patient's Last Name, First Name, M.I.  YOB: 1940  Meredith Allen                        Provider's Name  [facility] Medical Record No.  [id number]                             Onset Date:  07/16/24   Start of Care Date:  07/16/24   Type:     _X_PT   ___OT   ___SLP Medical Diagnosis:  (P) Syncope              PT Diagnosis:  difficulty walking Visits from SOC:  1     See note for plan of treatment, functional goals and certification details    I CERTIFY THE NEED FOR THESE SERVICES FURNISHED UNDER        THIS PLAN OF TREATMENT AND WHILE UNDER MY CARE     (Physician co-signature of this document indicates review and certification of the therapy plan).

## 2024-07-16 NOTE — PLAN OF CARE
PRIMARY DIAGNOSIS: SYNCOPE/TIA  OUTPATIENT/OBSERVATION GOALS TO BE MET BEFORE DISCHARGE:  1. Orthostatic performed: No - will get this shift    2. Diagnostic testing complete & at baseline neurologic testing: No    3. Cleared by consultants (if involved): No    4. Interpretation of cardiac rhythm per telemetry tech: Monitor ordered    5. Tolerating adequate PO diet and medications: Yes    6. Return to near baseline physical activity or neurologic status: No    Discharge Planner Nurse   Safe discharge environment identified: No  Barriers to discharge: Yes       Entered by: Livia Mackenzie RN 07/16/2024 2:00PM   A&Ox4, able to make needs known. Ax1 with GB and walker for ambulation, notes dizziness with movement. Patient becomes tearful with dizziness episodes and the possibility of looking at other living situations (discussion currently between daughter and patient). Tele monitor ordered. LBM this AM in ED. No bruising noted to left hip - location of body patient landed on during fall. Neuros intact. Plan: PT and SW consulted. Ortho BP to be collected.  Please review provider order for any additional goals.   Nurse to notify provider when observation goals have been met and patient is ready for discharge.  Goal Outcome Evaluation:      Plan of Care Reviewed With: patient    Overall Patient Progress: no change    Outcome Evaluation: Pt. A&Ox4, able to make needs known. PIV S/L. Tele ordered. Ax1 with walker and GB. Notes Dizziness with movement. denies pain while lying in bed but reports soreness with movement to left hip      Problem: Adult Inpatient Plan of Care  Goal: Plan of Care Review  Description: The Plan of Care Review/Shift note should be completed every shift.  The Outcome Evaluation is a brief statement about your assessment that the patient is improving, declining, or no change.  This information will be displayed automatically on your shift  note.  Outcome: Progressing  Flowsheets (Taken 7/16/2024  "1400)  Outcome Evaluation: Pt. A&Ox4, able to make needs known. PIV S/L. Tele ordered. Ax1 with walker and GB. Notes Dizziness with movement. denies pain while lying in bed but reports soreness with movement to left hip  Plan of Care Reviewed With: patient  Overall Patient Progress: no change  Goal: Patient-Specific Goal (Individualized)  Description: You can add care plan individualizations to a care plan. Examples of Individualization might be:  \"Parent requests to be called daily at 9am for status\", \"I have a hard time hearing out of my right ear\", or \"Do not touch me to wake me up as it startles  me\".  Outcome: Progressing  Flowsheets (Taken 7/16/2024 1400)  Anxieties, Fears or Concerns: being unable to care for myself  Goal: Absence of Hospital-Acquired Illness or Injury  Outcome: Progressing  Intervention: Identify and Manage Fall Risk  Recent Flowsheet Documentation  Taken 7/16/2024 1400 by Livia Mackenzie, RN  Safety Promotion/Fall Prevention:   activity supervised   mobility aid in reach   nonskid shoes/slippers when out of bed   room near nurse's station   safety round/check completed  Intervention: Prevent Infection  Recent Flowsheet Documentation  Taken 7/16/2024 1400 by Livia Mackenzie, RN  Infection Prevention:   equipment surfaces disinfected   hand hygiene promoted   rest/sleep promoted   cohorting utilized  Goal: Optimal Comfort and Wellbeing  Outcome: Progressing  Goal: Readiness for Transition of Care  Outcome: Progressing  Intervention: Mutually Develop Transition Plan  Recent Flowsheet Documentation  Taken 7/16/2024 1400 by Livia Mackenzie, RN  Equipment Currently Used at Home:   walker, rolling   grab bar, toilet   grab bar, tub/shower     Problem: Skin Injury Risk Increased  Goal: Skin Health and Integrity  Outcome: Progressing  Intervention: Optimize Skin Protection  Recent Flowsheet Documentation  Taken 7/16/2024 1400 by Livia Mackenzie, RN  Activity Management:   ambulated " to bathroom   back to bed

## 2024-07-16 NOTE — PHARMACY-ADMISSION MEDICATION HISTORY
Pharmacist Admission Medication History    Admission medication history is complete. The information provided in this note is only as accurate as the sources available at the time of the update.    Information Source(s): Patient, Hospital records, and CareEverywhere/[employer] via in-person    Pertinent Information: pt was prescribed gabapentin 100mg at bedtime x 3 days, then 100mg po bid x 3 days, then 100mg TID.  Pt took first day of 100mg BID yesterday.  Last fill for metoprolol per [facility] was 10/31/23 for 90 day supply.  Pt states she is still taking med.     Pt most recent fill for glimepiride was 7/6/24 1mg po bid, but pt states she is taking 0.5 tab (0.5mg) bid.  Pt states cutting pills.     Changes made to PTA medication list:  Added: gabapentin, prn tylenol, prn lidocaine patch, melatonin 5mg  Deleted: tylenol 650mg tid, lidocaine patch scheduled, metlatonin 10mg tabs, miralax 17 gm bid, senna/docusate 1 tab bid  Changed: glimepiride from 1 mg daily to 0.5mg bid per pt     Allergies reviewed with patient and updates made in EHR:  yes, pt does not know rxn to sulfa    Medication History Completed By: Dominique Dixon [facility] 7/16/2024 12:22 PM    PTA Med List   Medication Sig Last Dose    acetaminophen (TYLENOL) 325 MG tablet Take 650 mg by mouth every 6 hours as needed for mild pain Past Week at ?    aspirin 81 MG EC tablet Take 81 mg by mouth daily 7/15/2024 at am    calcium-magnesium (CALMAG) 500-250 MG TABS Take 1 tablet by mouth daily. 7/15/2024 at am    escitalopram (LEXAPRO) 10 MG tablet Take 10 mg by mouth daily 7/15/2024 at am    gabapentin (NEURONTIN) 100 MG capsule Take 100 mg by mouth 2 times daily For three days, then increase to 100mg po three times daily 7/15/2024 at pm    glimepiride (AMARYL) 1 MG tablet Take 0.5 mg by mouth 2 times daily (before meals) Hold if BS<100 or if not eating 7/15/2024 at supper    Lidocaine (LIDOCARE) 4 % Patch Place 1 patch onto the skin daily as needed for  moderate pain To prevent lidocaine toxicity, patient should be patch free for 12 hrs daily. Past Week at ?    melatonin 5 MG tablet Take 5 mg by mouth at bedtime 7/15/2024 at pm    meloxicam (MOBIC) 7.5 MG tablet Take 7.5 mg by mouth daily 7/15/2024 at am    metoprolol succinate ER (TOPROL XL) 25 MG 24 hr tablet Take 25 mg by mouth daily 7/15/2024 at am    Multiple Vitamin (MULTI-VITAMIN) per tablet Take 1 tablet by mouth daily. 7/15/2024 at am    Vitamin D3 (CHOLECALCIFEROL) 25 mcg (1000 units) tablet Take 1,000 Units by mouth daily 7/15/2024 at am

## 2024-07-16 NOTE — PLAN OF CARE
Patient remains A&Ox4. Ax1 with ambulation, use of walker and gait belt. Wait at edge of bed between each position change for approximately 30 seconds to 1 minute before ambulating. Ortho blood pressures completed - positive drop with various positions. Patient is symptomatic with positional changes. Tele - SR with HR of 75. Regular diet, tolerated well. Has rested on/off this afternoon.  Patient reported she is currently is scared to go home alone d/t her syncopal episodes. Patient and daughter are also discussing a change in long term living arrangements as well (Ind senior living vs residential). Plan: PT and SW consulted.  PT recommending either home with home care PT or TCU.

## 2024-07-16 NOTE — ED NOTES
[facility]  ED Nurse Handoff Report    ED Chief complaint: Fall  . ED Diagnosis:   Final diagnoses:   Lightheadedness   Accidental overdose of gabapentin   Transient hypotension   Closed head injury, initial encounter   Concussion without loss of consciousness, initial encounter       Allergies:   Allergies   Allergen Reactions    Sulfa Antibiotics        Code Status: DNR / DNI    Activity level - Baseline/Home:  independent.  Activity Level - Current:   assist of 1.   Lift room needed: No.   Bariatric: No   Needed: No   Isolation: No.   Infection: Not Applicable.     Respiratory status: Room air    Vital Signs (within 30 minutes):   Vitals:    07/16/24 0839 07/16/24 0854 07/16/24 0909 07/16/24 0924   BP: (!) 148/69 (!) 148/78 (!) 149/75 (!) 141/100   Pulse: 81  85    Resp:       Temp:       TempSrc:       SpO2: 94%  90%    Weight:           Cardiac Rhythm:  ,      Pain level:    Patient confused: No.   Patient Falls Risk: patient and family education.   Elimination Status: Has voided     Patient Report - Initial Complaint: Fall.   Focused Assessment: Meredith Allen is a 83 year old female who presents for fall. Meredith reports that last night she took a 2nd dose of her prescribed gabapentin, which had recently been increased. This morning she tried to get up from bed and fell, hitting her head. She endorses intermittent dizziness. She denies recent illness, chest pain, abdominal pain, or neck soreness.     Abnormal Results:   Labs Ordered and Resulted from Time of ED Arrival to Time of ED Departure   BASIC METABOLIC PANEL - Abnormal       Result Value    Sodium 134 (*)     Potassium 4.6      Chloride 99      Carbon Dioxide (CO2) 24      Anion Gap 11      Urea Nitrogen 29.0 (*)     Creatinine 0.83      GFR Estimate 70      Calcium 9.1      Glucose 228 (*)    ROUTINE UA WITH MICROSCOPIC - Abnormal    Color Urine Light Yellow      Appearance Urine Clear      Glucose Urine 70 (*)      Bilirubin Urine Negative      Ketones Urine Negative      Specific Gravity Urine 1.013      Blood Urine Negative      pH Urine 6.5      Protein Albumin Urine Negative      Urobilinogen Urine Normal      Nitrite Urine Negative      Leukocyte Esterase Urine Negative      RBC Urine <1      WBC Urine 2     GLUCOSE BY METER - Abnormal    GLUCOSE BY METER POCT 223 (*)    LACTIC ACID WHOLE BLOOD - Normal    Lactic Acid 1.8     KETONE BETA-HYDROXYBUTYRATE QUANTITATIVE, RAPID - Normal    Ketone (Beta-Hydroxybutyrate) Quantitative <0.18     INFLUENZA A/B, RSV, & SARS-COV2 PCR - Normal    Influenza A PCR Negative      Influenza B PCR Negative      RSV PCR Negative      SARS CoV2 PCR Negative     CBC WITH PLATELETS AND DIFFERENTIAL    WBC Count 10.3      RBC Count 4.62      Hemoglobin 13.7      Hematocrit 41.5      MCV 90      MCH 29.7      MCHC 33.0      RDW 13.4      Platelet Count 252      % Neutrophils 68      % Lymphocytes 23      % Monocytes 7      % Eosinophils 1      % Basophils 1      % Immature Granulocytes 0      NRBCs per 100 WBC 0      Absolute Neutrophils 7.0      Absolute Lymphocytes 2.4      Absolute Monocytes 0.7      Absolute Eosinophils 0.1      Absolute Basophils 0.1      Absolute Immature Granulocytes 0.0      Absolute NRBCs 0.0     GLUCOSE MONITOR NURSING POCT   TYPE AND SCREEN, ADULT    ABO/RH(D) O POS      Antibody Screen Negative      SPECIMEN EXPIRATION DATE [id number]     ABO/RH TYPE AND SCREEN        Chest XR,  PA & LAT   Final Result   IMPRESSION: No acute cardiopulmonary disease. No visible pneumothorax   or visible displaced fracture.      JEET VASQUEZ MD            SYSTEM ID:  [id number]      Head CT w/o contrast   Final Result   IMPRESSION:   1. No acute intracranial pathology. Small left parietal scalp   edema/hematoma.    2. Chronic small vessel ischemic disease and generalized cerebral   volume loss.      GRACIELA CORTEZ MD            SYSTEM ID:  [id number]      CT Cervical Spine w/o  Contrast   Final Result   IMPRESSION:    1. No evidence of acute fracture or subluxation in the cervical spine.   2. Degenerative changes in the cervical spine as described above.      GRACIELA CORTEZ MD            SYSTEM ID:  [id number]          Treatments provided: Fluids   Family Comments:   OBS brochure/video discussed/provided to patient:    ED Medications:   Medications   sodium chloride 0.9% BOLUS 1,000 mL (1,000 mLs Intravenous $New Bag 7/16/24 1029)   sodium chloride 0.9% BOLUS 1,000 mL (0 mLs Intravenous Stopped 7/16/24 1029)   ondansetron (ZOFRAN) injection 4 mg (4 mg Intravenous $Given 7/16/24 0650)       Drips infusing:  Yes  For the majority of the shift this patient was Green.   Interventions performed were NA.    Sepsis treatment initiated: No    Cares/treatment/interventions/medications to be completed following ED care:     ED Nurse Name: Shi Vilchis RN  10:32 AM    RECEIVING UNIT ED HANDOFF REVIEW    Above ED Nurse Handoff Report was reviewed: Yes  Reviewed by: Livia Mackenzie RN on July 16, 2024 at 12:43 PM

## 2024-07-16 NOTE — H&P
[facility]  Internal Medicine  History and Physical      Patient Name: Meredith Allen MRN# [id number]   Age: 83 year old YOB: 1940     Date of Admission:7/16/2024    Primary care provider: Lia Saini  Date of Service: 7/16/2024         Assessment and Plan:   Meredith Allen is a 83 year old female with a history of IDA, Vertigo, Depression, Insomnia, DM Type 2, HTN, Chronic Back Pain who presents to the ED today after a fall.    Patient reports she lives alone in an independent apartment.  She has a history of chronic low back pain since the 1980s after an injury.  She was recently started on Gabapentin for back pain of which she took 100mg daily for 4 days without significant relief, so titrated up to 200mg last night as she reports was instructed by her doctor.  This morning when she awoke, she was making coffee and toast and felt generalized weakness, dizziness and feels tired.  She fell to the ground but reports she did not lose consciousness.  She called her daughter on the phone and EMS was called.  Currently, she reports her back pain is in the low back and chronic.  She has a new right hip pain since her fall.  Denies any headache or dizziness currently lying in bed.      Fall  Near Syncope  Closed Head Injury  Suspect fall, dizziness and fatigue is due to taking 200mg of Gabapentin last evening for her low back pain.  Noted to have transient hypotension on arrival which normalized prior to IVF.  Laboratory work up unremarkable.  EKG with NSR.  CXR negative.  CTH/CT C/S revealed a Small left parietal scalp edema/hematoma.   - hold Gabapentin  - check orthostatics  - IVF hydration  - monitor on telemetry      Chronic Low Back Pain  Acute Right Hip Pain  Hx of Radiculopathy  Pt reports chronic low back pain with new right hip pain after her fall today.  No acute radicular symptoms currently.  - will obtain lumbar and right hip xrays  - tylenol prn    DM Type 2  Peripheral  Neuropathy  Pt reports chronic BLE neuropathy at baseline.  Hgb A1C 8.6 (12/2023).  BS on admission 228.   - start ISS protocol  - hold Amaryl for now    HTN  - hold pta Toprol XL for now  - holding pta ASA for now s/p head injury    Depression  - continue Lexapro    CODE: full  Diet/IVF: regular  DVT ppx: scd    Tatiana Mcghee MS RUTH  Physician Assistant   Hospitalist Service    Awaiting formal pharmacy med rec to confirm home medications.           Chief Complaint:   Fall          HPI:   83 year old female with a history of IDA, Vertigo, Depression, Insomnia, DM Type 2, HTN, Chronic Back Pain who presents to the ED today after a fall.    Patient reports she lives alone in an independent apartment.  She has a history of chronic low back pain since the 1980s after an injury.  She was recently started on Gabapentin for back pain of which she took 100mg daily for 4 days without significant relief, so titrated up to 200mg last night as she reports was instructed by her doctor.  This morning when she awoke, she was making coffee and toast and felt generalized weakness, dizziness and feels tired.  She fell to the ground but reports she did not lose consciousness.  She called her daughter on the phone and EMS was called.  Currently, she reports her back pain is in the low back and chronic.  She has a new right hip pain since her fall.  Denies any headache or dizziness currently lying in bed.          Per chart review:  Patient was seen in the ED on 7/6/24 due to left sided back and flank pain that radiates down her left leg.  CT of the lumbar spine, abdomen and pelvis were negative for any acute abnormality. She was found to have a bladder infection and placed on oral Ceftin.  She was seen in UC on 7/8  with back pain and dysuria with negative urine culture.  Treated with Meloxicam.  Followed up with PCP on 7/11 with left hip pain radiating down left leg to the left foot and diagnosed with Sciatica.  Pt declined to start  a Medrol Dosepak and was started on Gabapentin 100mg with instructions to titrate up as tolerated.            Past Medical History:     Past Medical History:   Diagnosis Date    Back pain     Hyperlipidemia     Migraines           Past Surgical History:     Past Surgical History:   Procedure Laterality Date    CHOLECYSTECTOMY      COLONOSCOPY      CYSTECTOMY OVARIAN BENIGN      HYSTERECTOMY      HYSTERECTOMY TOTAL ABDOMINAL, REPAIR BLADDER, COMBINED      PHACOEMULSIFICATION CLEAR CORNEA WITH STANDARD INTRAOCULAR LENS IMPLANT  2/15/2012    Procedure:PHACOEMULSIFICATION CLEAR CORNEA WITH STANDARD INTRAOCULAR LENS IMPLANT; RIGHT PHACOEMULSIFICATION CLEAR CORNEA WITH STANDARD INTRAOCULAR LENS IMPLANT WITH BSS  ; Surgeon:BETSY COHEN; Location:[facility]          Social History:     Social History     Socioeconomic History    Marital status:      Spouse name: Not on file    Number of children: Not on file    Years of education: Not on file    Highest education level: Not on file   Occupational History    Not on file   Tobacco Use    Smoking status: Never    Smokeless tobacco: Not on file   Substance and Sexual Activity    Alcohol use: No    Drug use: Not on file    Sexual activity: Not on file   Other Topics Concern    Parent/sibling w/ CABG, MI or angioplasty before 65F 55M? Not Asked   Social History Narrative    Not on file     Social Determinants of Health     Financial Resource Strain: Low Risk  (10/17/2023)    [facility], [facility]    Financial Resource Strain     Difficulty of Paying Living Expenses: 3     Difficulty of Paying Living Expenses: Not on file   Food Insecurity: No Food Insecurity (10/17/2023)    [facility], [facility]    Food Insecurity     Worried About Running Out of Food in the Last Year: 1   Transportation Needs: No  Transportation Needs (10/17/2023)    [place] [facility], [facility]    Transportation Needs     Lack of Transportation (Medical): 1   Physical Activity: Not on file   Stress: Not on file   Social Connections: Socially Integrated (10/17/2023)    Received from [facility], [facility] [place]    Social Connections     Frequency of Communication with Friends and Family: 0   Interpersonal Safety: Not on file   Housing Stability: Low Risk  (10/17/2023)    [place] [facility] [place] [place] [facility], [facility] [facility] [place]    Housing Stability     Unable to Pay for Housing in the Last Year: 1          Family History:     Heart Disease Father   heart attack   Other Mother   post polio syndrome          Allergies:      Allergies   Allergen Reactions    Sulfa Antibiotics           Medications:     Prior to Admission medications    Medication Sig Last Dose Taking? Auth Provider Long Term End Date   acetaminophen (TYLENOL) 325 MG tablet Take 2 tablets (650 mg) by mouth 3 times daily And once daily PRN   Hali Dinero APRN CNP     aspirin 81 MG EC tablet Take 81 mg by mouth daily   Unknown, Entered By History     blood glucose (NO BRAND SPECIFIED) test strip Use to test blood sugar 2 times daily or as directed. To accompany: Blood Glucose Monitor Brands: per insurance.   Hali Dinero APRN CNP     blood glucose monitoring (NO BRAND SPECIFIED) meter device kit Use to test blood sugar 2 times daily or as directed. Preferred blood glucose meter OR supplies to accompany: Blood Glucose Monitor Brands: per insurance.   Hali Dinero APRN CNP     calcium-magnesium (CALMAG) 500-250 MG TABS Take 1 tablet by mouth daily.   Reported, Patient     escitalopram (LEXAPRO) 10 MG tablet Take 10 mg by mouth daily   Unknown, Entered By History Yes    glimepiride  (AMARYL) 1 MG tablet Take 1 mg by mouth every morning (before breakfast) Hold if BS<100 or if not eating   Unknown, Entered By History Yes    Lidocaine (LIDOCARE) 4 % Patch Place 2 patches onto the skin every 24 hours To prevent lidocaine toxicity, patient should be patch free for 12 hrs daily.   Hali Dinero APRN CNP     Melatonin 10 MG SUBL Place 5 mg under the tongue at bedtime   Hali Dinero APRN CNP     metoprolol succinate ER (TOPROL XL) 25 MG 24 hr tablet Take 25 mg by mouth daily   Unknown, Entered By History Yes    Multiple Vitamin (MULTI-VITAMIN) per tablet Take 1 tablet by mouth daily.   Reported, Patient     polyethylene glycol (MIRALAX) 17 GM/Dose powder Take 17 g by mouth 2 times daily   Hali Dinero APRN CNP     SENNA-docusate sodium (SENNA S) 8.6-50 MG tablet Take 1 tablet by mouth 2 times daily And 1 tab BID PRN   Hali Dinero APRN CNP     thin (NO BRAND SPECIFIED) lancets Use with lanceting device. To accompany: Blood Glucose Monitor Brands: per insurance.   Hali Dinero APRN CNP     Vitamin D3 (CHOLECALCIFEROL) 25 mcg (1000 units) tablet Take 1,000 Units by mouth daily   Reported, Patient            Review of Systems:   A complete ROS was performed and is negative other than what is stated in the HPI.       Physical Exam:   Blood pressure 138/69, pulse 82, temperature 97  F (36.1  C), temperature source Temporal, resp. rate 18, weight 77.1 kg (170 lb), SpO2 94%.  General: Alert, interactive, NAD  HEENT: AT/NC, sclera anicteric, PERRL, EOMI, MMM  Neck: Supple, no tenderness  Chest/Resp: clear to auscultation bilaterally, no crackles or wheezes  Heart/CV: regular rate and rhythm, 2/6 systolic murmur  Abdomen/GI: Soft, nontender, nondistended. +BS.  No rebound or guarding.  Extremities/MSK: No LE edema.  Right lateral hip pain to palpation.  Low back pain to palpation.   Skin: Warm and dry  Neuro: Alert & oriented x 3, appears fatigued and was sleeping but arousable  and oriented.  no focal deficits, moves all extremities equally         Labs:   ROUTINE ICU LABS (Last four results)  CMP  Recent Labs   Lab 07/16/24  0659 07/16/24  0648   NA  --  134*   POTASSIUM  --  4.6   CHLORIDE  --  99   CO2  --  24   ANIONGAP  --  11   * 228*   BUN  --  29.0*   CR  --  0.83   GFRESTIMATED  --  70   MILAGROS  --  9.1     CBC  Recent Labs   Lab 07/16/24  0648   WBC 10.3   RBC 4.62   HGB 13.7   HCT 41.5   MCV 90   MCH 29.7   MCHC 33.0   RDW 13.4        INRNo lab results found in last 7 days.  Arterial Blood GasNo lab results found in last 7 days.       Imaging/Procedures:     Results for orders placed or performed during the hospital encounter of 07/16/24   Head CT w/o contrast    Narrative    EXAM: CT HEAD W/O CONTRAST  7/16/2024 8:07 AM     HISTORY:  fall       COMPARISON:  CTA 12/30/2023, head CT 10/27/2021    TECHNIQUE: Using multidetector thin collimation helical acquisition  technique, axial, coronal and sagittal CT images from the skull base  to the vertex were obtained without intravenous contrast.   (topogram) image(s) also obtained and reviewed. Dose reduction  techniques were performed.    FINDINGS:  No intracranial hemorrhage, mass effect, or midline shift. No acute  loss of gray-white matter differentiation in the cerebral hemispheres.  Ventricles are proportionate to the cerebral sulci. Clear basal  cisterns. Periventricular hypoattenuation, consistent with chronic  small vessel ischemic disease. Mild generalized cerebral volume loss.    The bony calvaria and the bones of the skull base are normal. Small  left parietal scalp edema/hematoma. Chronic osteitis and complete  opacification of the left sphenoid locule. The remaining paranasal  sinuses and mastoid air cells are clear. Grossly normal orbits.       Impression    IMPRESSION:  1. No acute intracranial pathology. Small left parietal scalp  edema/hematoma.   2. Chronic small vessel ischemic disease and  generalized cerebral  volume loss.    GRACIELA CORTEZ MD         SYSTEM ID:  [id number]   CT Cervical Spine w/o Contrast    Narrative    EXAM: CT CERVICAL SPINE W/O CONTRAST  7/16/2024 8:05 AM     HISTORY: fall       COMPARISON: CT 12/30/2023    TECHNIQUE: Using multidetector thin collimation helical acquisition  technique, axial, coronal and sagittal CT images through the cervical  spine were obtained without intravenous contrast. Dose reduction  techniques were used.    FINDINGS:  No evidence of fracture. No prevertebral soft tissue swelling.  Alignment is normal. Vertebral body height is maintained. No  destructive osseous lesions. Moderate degenerative endplate changes  and loss of disc height at C5-6 and C6-7. Mild degenerative endplate  changes and loss of disc height at the other levels. No spinal canal  narrowing at any level. Moderate neural foraminal narrowings at C5-6  and C6-7.    Visualized paraspinous tissues: Unremarkable.      Impression    IMPRESSION:   1. No evidence of acute fracture or subluxation in the cervical spine.  2. Degenerative changes in the cervical spine as described above.    GRACIELA CORTEZ MD         SYSTEM ID:  [id number]   Chest XR,  PA & LAT    Narrative    CHEST TWO VIEWS   7/16/2024 8:09 AM     HISTORY: Fall.    COMPARISON: 7/31/2012.      Impression    IMPRESSION: No acute cardiopulmonary disease. No visible pneumothorax  or visible displaced fracture.    JEET VASQUEZ MD         SYSTEM ID:  [id number]

## 2024-07-16 NOTE — ED NOTES
Ambulated Pt. In hallway to bathroom. Pt. Gait steady. Pt. Lightheaded and dizzy when ambulating. MD notified

## 2024-07-16 NOTE — ED TRIAGE NOTES
Pt BIBA d/t fall. Pt states she was dizzy and fell hitting head on floor. Pt was using walker at time. States she took 2 gabapentin instead of 1 and feels like this is why she is dizzy. Denies thinners, LOC. C-collar placed by EMS. . ABCs intact, A&Ox4, VSS.

## 2024-07-17 ENCOUNTER — APPOINTMENT (OUTPATIENT)
Dept: PHYSICAL THERAPY | Facility: CLINIC | Age: 84
End: 2024-07-17
Payer: COMMERCIAL

## 2024-07-17 LAB
ANION GAP SERPL CALCULATED.3IONS-SCNC: 9 MMOL/L (ref 7–15)
BASOPHILS # BLD AUTO: 0.1 10E3/UL (ref 0–0.2)
BASOPHILS NFR BLD AUTO: 1 %
BUN SERPL-MCNC: 22.6 MG/DL (ref 8–23)
CALCIUM SERPL-MCNC: 8.8 MG/DL (ref 8.8–10.4)
CHLORIDE SERPL-SCNC: 102 MMOL/L (ref 98–107)
CREAT SERPL-MCNC: 0.72 MG/DL (ref 0.51–0.95)
EGFRCR SERPLBLD CKD-EPI 2021: 83 ML/MIN/1.73M2
EOSINOPHIL # BLD AUTO: 0.1 10E3/UL (ref 0–0.7)
EOSINOPHIL NFR BLD AUTO: 2 %
ERYTHROCYTE [DISTWIDTH] IN BLOOD BY AUTOMATED COUNT: 13.4 % (ref 10–15)
GLUCOSE SERPL-MCNC: 168 MG/DL (ref 70–99)
HCO3 SERPL-SCNC: 27 MMOL/L (ref 22–29)
HCT VFR BLD AUTO: 42 % (ref 35–47)
HGB BLD-MCNC: 13.5 G/DL (ref 11.7–15.7)
IMM GRANULOCYTES # BLD: 0 10E3/UL
IMM GRANULOCYTES NFR BLD: 0 %
LYMPHOCYTES # BLD AUTO: 2 10E3/UL (ref 0.8–5.3)
LYMPHOCYTES NFR BLD AUTO: 28 %
MCH RBC QN AUTO: 29.6 PG (ref 26.5–33)
MCHC RBC AUTO-ENTMCNC: 32.1 G/DL (ref 31.5–36.5)
MCV RBC AUTO: 92 FL (ref 78–100)
MONOCYTES # BLD AUTO: 0.7 10E3/UL (ref 0–1.3)
MONOCYTES NFR BLD AUTO: 9 %
NEUTROPHILS # BLD AUTO: 4.4 10E3/UL (ref 1.6–8.3)
NEUTROPHILS NFR BLD AUTO: 60 %
NRBC # BLD AUTO: 0 10E3/UL
NRBC BLD AUTO-RTO: 0 /100
PLATELET # BLD AUTO: NORMAL 10*3/UL
POTASSIUM SERPL-SCNC: 4.8 MMOL/L (ref 3.4–5.3)
RBC # BLD AUTO: 4.56 10E6/UL (ref 3.8–5.2)
SODIUM SERPL-SCNC: 138 MMOL/L (ref 135–145)
WBC # BLD AUTO: 7.4 10E3/UL (ref 4–11)

## 2024-07-17 PROCEDURE — 99232 SBSQ HOSP IP/OBS MODERATE 35: CPT | Performed by: INTERNAL MEDICINE

## 2024-07-17 PROCEDURE — 250N000013 HC RX MED GY IP 250 OP 250 PS 637: Performed by: PHYSICIAN ASSISTANT

## 2024-07-17 PROCEDURE — G0378 HOSPITAL OBSERVATION PER HR: HCPCS

## 2024-07-17 PROCEDURE — 258N000003 HC RX IP 258 OP 636: Performed by: INTERNAL MEDICINE

## 2024-07-17 PROCEDURE — 85041 AUTOMATED RBC COUNT: CPT | Performed by: INTERNAL MEDICINE

## 2024-07-17 PROCEDURE — 97110 THERAPEUTIC EXERCISES: CPT | Mod: GP | Performed by: PHYSICAL THERAPIST

## 2024-07-17 PROCEDURE — 250N000013 HC RX MED GY IP 250 OP 250 PS 637: Performed by: INTERNAL MEDICINE

## 2024-07-17 PROCEDURE — 82565 ASSAY OF CREATININE: CPT | Performed by: INTERNAL MEDICINE

## 2024-07-17 PROCEDURE — 97530 THERAPEUTIC ACTIVITIES: CPT | Mod: GP | Performed by: PHYSICAL THERAPIST

## 2024-07-17 PROCEDURE — 36415 COLL VENOUS BLD VENIPUNCTURE: CPT | Performed by: INTERNAL MEDICINE

## 2024-07-17 RX ORDER — NALOXONE HYDROCHLORIDE 0.4 MG/ML
0.4 INJECTION, SOLUTION INTRAMUSCULAR; INTRAVENOUS; SUBCUTANEOUS
Status: DISCONTINUED | OUTPATIENT
Start: 2024-07-17 | End: 2024-07-18 | Stop reason: HOSPADM

## 2024-07-17 RX ORDER — NALOXONE HYDROCHLORIDE 0.4 MG/ML
0.2 INJECTION, SOLUTION INTRAMUSCULAR; INTRAVENOUS; SUBCUTANEOUS
Status: DISCONTINUED | OUTPATIENT
Start: 2024-07-17 | End: 2024-07-18 | Stop reason: HOSPADM

## 2024-07-17 RX ORDER — OXYCODONE HYDROCHLORIDE 5 MG/1
5 TABLET ORAL EVERY 4 HOURS PRN
Status: DISCONTINUED | OUTPATIENT
Start: 2024-07-17 | End: 2024-07-18 | Stop reason: HOSPADM

## 2024-07-17 RX ADMIN — OXYCODONE HYDROCHLORIDE 5 MG: 5 TABLET ORAL at 22:56

## 2024-07-17 RX ADMIN — ACETAMINOPHEN 650 MG: 325 TABLET, FILM COATED ORAL at 01:21

## 2024-07-17 RX ADMIN — OXYCODONE HYDROCHLORIDE 5 MG: 5 TABLET ORAL at 19:03

## 2024-07-17 RX ADMIN — OXYCODONE HYDROCHLORIDE 5 MG: 5 TABLET ORAL at 08:08

## 2024-07-17 RX ADMIN — ACETAMINOPHEN 650 MG: 325 TABLET, FILM COATED ORAL at 16:41

## 2024-07-17 RX ADMIN — OXYCODONE HYDROCHLORIDE 5 MG: 5 TABLET ORAL at 05:30

## 2024-07-17 RX ADMIN — ESCITALOPRAM OXALATE 10 MG: 10 TABLET ORAL at 08:05

## 2024-07-17 RX ADMIN — ACETAMINOPHEN 650 MG: 325 TABLET, FILM COATED ORAL at 22:33

## 2024-07-17 ASSESSMENT — ACTIVITIES OF DAILY LIVING (ADL)
ADLS_ACUITY_SCORE: 29
ADLS_ACUITY_SCORE: 28
ADLS_ACUITY_SCORE: 29
ADLS_ACUITY_SCORE: 28
ADLS_ACUITY_SCORE: 28
ADLS_ACUITY_SCORE: 29
ADLS_ACUITY_SCORE: 31
ADLS_ACUITY_SCORE: 29
ADLS_ACUITY_SCORE: 28
DEPENDENT_IADLS:: INDEPENDENT
ADLS_ACUITY_SCORE: 31
ADLS_ACUITY_SCORE: 31
ADLS_ACUITY_SCORE: 29
ADLS_ACUITY_SCORE: 32
ADLS_ACUITY_SCORE: 28
ADLS_ACUITY_SCORE: 31
ADLS_ACUITY_SCORE: 28
ADLS_ACUITY_SCORE: 29
ADLS_ACUITY_SCORE: 28
ADLS_ACUITY_SCORE: 31
ADLS_ACUITY_SCORE: 31
ADLS_ACUITY_SCORE: 28

## 2024-07-17 NOTE — PLAN OF CARE
PRIMARY DIAGNOSIS: Dizziness  OUTPATIENT/OBSERVATION GOALS TO BE MET BEFORE DISCHARGE:  ADLs back to baseline: No    Activity and level of assistance: Up with standby assistance.    Pain status: Improved-controlled with oral pain medications.    Return to near baseline physical activity: No     Discharge Planner Nurse   Safe discharge environment identified: Yes  Barriers to discharge: Yes       Entered by: Laura Low RN 07/16/2024 9:20 PM    Patient is A&Ox4, up with assist x 1 and walker, complaining of gloria hip pain- tylenol given, PT/SW following for possible placement, Tele: SR 70s       Please review provider order for any additional goals.   Nurse to notify provider when observation goals have been met and patient is ready for discharge.Goal Outcome Evaluation:    Problem: Adult Inpatient Plan of Care  Goal: Plan of Care Review  Description: The Plan of Care Review/Shift note should be completed every shift.  The Outcome Evaluation is a brief statement about your assessment that the patient is improving, declining, or no change.  This information will be displayed automatically on your shift  note.  Outcome: Progressing  Flowsheets (Taken 7/16/2024 2119)  Outcome Evaluation: dizziness improving, complanining of gloria hip pain  Plan of Care Reviewed With: patient  Overall Patient Progress: improving

## 2024-07-17 NOTE — PLAN OF CARE
PRIMARY DIAGNOSIS: Dizziness  OUTPATIENT/OBSERVATION GOALS TO BE MET BEFORE DISCHARGE:  ADLs back to baseline: No    Activity and level of assistance: Up with standby assistance.    Pain status: Improved-controlled with oral pain medications.    Return to near baseline physical activity: No     Discharge Planner Nurse   Safe discharge environment identified: No  Barriers to discharge: Yes       Entered by: Laura Low RN 07/16/2024 11:50 PM    Patient is A&Ox4, up with assist x1 and walker, complaining of gloria hip pain- improved with tylenol, PT/SW following for potential placement, Tele: SR 60s       Please review provider order for any additional goals.   Nurse to notify provider when observation goals have been met and patient is ready for discharge.Goal Outcome Evaluation:      Plan of Care Reviewed With: patient    Overall Patient Progress: improvingOverall Patient Progress: improving    Outcome Evaluation: pain improved, dizziness also improving

## 2024-07-17 NOTE — PLAN OF CARE
PRIMARY DIAGNOSIS: Dizziness, fall  OUTPATIENT/OBSERVATION GOALS TO BE MET BEFORE DISCHARGE:  ADLs back to baseline: No    Activity and level of assistance: Up with standby assistance.    Pain status: Improved-controlled with oral pain medications.    Return to near baseline physical activity: No     Discharge Planner Nurse   Safe discharge environment identified: No  Barriers to discharge: Yes       Entered by: Laura Low RN 07/17/2024 4:40 AM    Patient with gloria hip pain- Left greater than right- tylenol given and ice pack applied, PT/SW following for possible placement, dizziness improving when out of bed, tele: SR 70s     Please review provider order for any additional goals.   Nurse to notify provider when observation goals have been met and patient is ready for discharge.Goal Outcome Evaluation:      Plan of Care Reviewed With: patient    Overall Patient Progress: no changeOverall Patient Progress: no change    Outcome Evaluation: pain in gloria hips, dizziness improved, PT to re-evaluate

## 2024-07-17 NOTE — PLAN OF CARE
"PRIMARY DIAGNOSIS: \"GENERIC\" NURSING Lightheaded, Dizzy, Fall   OUTPATIENT/OBSERVATION GOALS TO BE MET BEFORE DISCHARGE:  ADLs back to baseline: No    Activity and level of assistance: Up with standby assistance.    Pain status: Improved-controlled with oral pain medications.    Return to near baseline physical activity: No     Discharge Planner Nurse   Safe discharge environment identified: Yes  Barriers to discharge: Yes       Entered by: Cyrus Wayne RN 07/17/2024 10:32 AM     Please review provider order for any additional goals.   Nurse to notify provider when observation goals have been met and patient is ready for discharge.  Goal Outcome Evaluation:       Pt is alert and orientated x4, pleasant. Tolerating regular diet, assist of 1 gb/walker. C/o right hip pain as she fell on that side. PIV S/L. BG checks done. Gabbapentin on hold for now. Pt has hx of Vertigo, low back pain, lives alone. Plan is to discharge to TCU                 "

## 2024-07-17 NOTE — PROGRESS NOTES
PRIMARY DIAGNOSIS: FALL/ LEFT HIP PAIN  OUTPATIENT/OBSERVATION GOALS TO BE MET BEFORE DISCHARGE:  1. Pain Status: Improved-controlled with oral pain medications.    2. Return to near baseline physical activity: Yes    3. Cleared for discharge by consultants (if involved): No    Discharge Planner Nurse   Safe discharge environment identified: Yes  Barriers to discharge: Yes       Entered by: Joi Gray RN 07/17/2024 6:04 PM   Pt A&O x 4. VSS. Assist x 1 with gait belt and walker. On room air. BS+. C/o L hip pain 5/10. PRN Tylenol given. Had some pain relief. PIV SL Tele SR. Neuro intact. Denies dizziness, nausea or SOB. Tolerating regular diet.   Please review provider order for any additional goals.   Nurse to notify provider when observation goals have been met and patient is ready for discharge.

## 2024-07-17 NOTE — CONSULTS
Care Management Initial Consult    General Information  Assessment completed with: Patient, Children, Pt - Meredith & dtr Martita  Type of CM/SW Visit: Initial Assessment    Primary Care Provider verified and updated as needed: Yes   Readmission within the last 30 days: no previous admission in last 30 days      Reason for Consult: discharge planning  Advance Care Planning: Advance Care Planning Reviewed: no concerns identified        Communication Assessment  Patient's communication style: spoken language (English or Bilingual)    Hearing Difficulty or Deaf: yes   Wear Glasses or Blind: yes    Cognitive  Cognitive/Neuro/Behavioral: WDL                      Living Environment:   People in home: alone     Current living Arrangements: apartment      Able to return to prior arrangements: yes    Family/Social Support:  Care provided by: self  Provides care for: no one  Marital Status:   Children          Description of Support System: Supportive, Involved    Support Assessment: Adequate family and caregiver support    Current Resources:   Patient receiving home care services: No     Community Resources: None  Equipment currently used at home: walker, rolling, grab bar, toilet, grab bar, tub/shower  Supplies currently used at home:      Employment/Financial:  Employment Status:          Financial Concerns: none   Referral to Financial Worker: No     Lifestyle & Psychosocial Needs:  Social Determinants of Health     Food Insecurity: No Food Insecurity (10/17/2023)    [facility] [employer] [facility]    Food Insecurity     Worried About Running Out of Food in the Last Year: 1   Depression: At risk (10/17/2023)    [facility] [employer]  [facility]    PHQ-2     PHQ-2 TOTAL SCORE: 5   Housing Stability: Low Risk  (10/17/2023)    Received from [facility], [facility]    Housing Stability     Unable to Pay for Housing in the Last Year: 1   Tobacco Use: Low Risk  (7/15/2024)    Received from [facility]    Patient History     Smoking Tobacco Use: Never     Smokeless Tobacco Use: Never     Passive Exposure: Not on file   Financial Resource Strain: Low Risk  (10/17/2023)    [facility] from [facility], [facility]    Financial Resource Strain     Difficulty of Paying Living Expenses: 3     Difficulty of Paying Living Expenses: Not on file   Alcohol Use: Not on file   Transportation Needs: No Transportation Needs (10/17/2023)    [facility] from [facility], [facility]    Transportation Needs     Lack of Transportation (Medical): 1   Physical Activity: Not on file   Interpersonal Safety: Not on file   Stress: Not on file   Social Connections: Socially Integrated (10/17/2023)    [facility] from [facility], [facility]    Social Connections     Frequency of Communication with Friends and Family: 0   Health Literacy: Not on file     Functional Status:  Prior to admission patient needed assistance:   Dependent ADLs:: Independent, Ambulation-walker  Dependent IADLs:: Independent     Mental Health Status:  Mental Health Status: No Current Concerns       Chemical Dependency Status:  Chemical Dependency Status: No Current Concerns           Values/Beliefs:  Spiritual, Cultural Beliefs, Voodoo Practices, Values that affect care:               Additional Information:  Patient is 83 year old female admitted on 7/16/2024 with a chief complaint of \"fall\" (per H&P). Chart reviewed. Updated by PT that TCU is recommended.     Met with pt this date and introduced self and social work role. Pt was accompanied by dtr " Martita. Pt resides in an apartment alone. Martita lives a little over an hour away. Pt/Martita in agreement with TCU recommendation. Pt would like to go to [facility]. She has been there in the past and her late  was also there a few times. Martita would provide transportation at discharge.     SW faxed TCU referral to [facility]. Pt will require [facility] Medicare Advantage insurance prior authorization.     Social work will continue to follow and assist with discharge planning as needed.    TESS Gannon, [facility]  Care Coordination  [phone]    TESS Oliveira

## 2024-07-17 NOTE — PLAN OF CARE
"Goal Outcome Evaluation:      Plan of Care Reviewed With: patient    Overall Patient Progress: no changeOverall Patient Progress: no change    Outcome Evaluation: Pt still having pain in both hips.  Dizziness improved      Problem: Adult Inpatient Plan of Care  Goal: Plan of Care Review  Description: The Plan of Care Review/Shift note should be completed every shift.  The Outcome Evaluation is a brief statement about your assessment that the patient is improving, declining, or no change.  This information will be displayed automatically on your shift  note.  Outcome: Not Progressing  Flowsheets (Taken 7/17/2024 1324)  Outcome Evaluation: Pt still having pain in both hips.  Dizziness improved  Plan of Care Reviewed With: patient  Overall Patient Progress: no change  Goal: Patient-Specific Goal (Individualized)  Description: You can add care plan individualizations to a care plan. Examples of Individualization might be:  \"Parent requests to be called daily at 9am for status\", \"I have a hard time hearing out of my right ear\", or \"Do not touch me to wake me up as it startles  me\".  Outcome: Not Progressing  Goal: Absence of Hospital-Acquired Illness or Injury  Outcome: Not Progressing  Intervention: Identify and Manage Fall Risk  Recent Flowsheet Documentation  Taken 7/17/2024 0804 by Cyrus Wayne, RN  Safety Promotion/Fall Prevention: activity supervised  Intervention: Prevent Skin Injury  Recent Flowsheet Documentation  Taken 7/17/2024 0804 by Cyrus Wayne, RN  Body Position: position changed independently  Goal: Optimal Comfort and Wellbeing  Outcome: Not Progressing  Intervention: Monitor Pain and Promote Comfort  Recent Flowsheet Documentation  Taken 7/17/2024 0804 by Cyrus Wayne, RN  Pain Management Interventions: medication (see MAR)  Goal: Readiness for Transition of Care  Outcome: Not Progressing     Problem: Skin Injury Risk Increased  Goal: Skin Health and Integrity  Outcome: Not " Progressing  Intervention: Plan: Nurse Driven Intervention: Moisture Management  Recent Flowsheet Documentation  Taken 7/17/2024 0800 by Cyrus Wayne, RN  Moisture Interventions:   Encourage regular toileting   No brief in bed   Incontinence pad  Bathing/Skin Care: other (see comments)  Intervention: Optimize Skin Protection  Recent Flowsheet Documentation  Taken 7/17/2024 0804 by Cyrus Wayne, RN  Activity Management:   activity adjusted per tolerance   ambulated to bathroom   up in chair

## 2024-07-17 NOTE — PLAN OF CARE
"PRIMARY DIAGNOSIS: \"GENERIC\" NURSING Lightheaded, Dizzy, Fall   OUTPATIENT/OBSERVATION GOALS TO BE MET BEFORE DISCHARGE:  ADLs back to baseline: No    Activity and level of assistance: Up with standby assistance.    Pain status: Improved-controlled with oral pain medications.    Return to near baseline physical activity: No     Discharge Planner Nurse   Safe discharge environment identified: Yes  Barriers to discharge: Yes       Entered by: Cyrus Wayne RN 07/17/2024 1:19 PM     Please review provider order for any additional goals.   Nurse to notify provider when observation goals have been met and patient is ready for discharge.  Goal Outcome Evaluation:       Pt is alert and orientated x4, pleasant. Tolerating regular diet, assist of 1 gb/walker. C/o right hip pain as she fell on that side. PIV S/L. BG checks done. Gabbapentin on hold for now. Pt has hx of Vertigo, low back pain, lives alone. Plan is to discharge to TCU  orthostatic BP's Infusing Bolus of 500 LR over 2 hours.                  "

## 2024-07-17 NOTE — PROGRESS NOTES
Care Management Follow Up    Length of Stay (days): 0    Expected Discharge Date: 07/18/2024      Additional Information:  Faxed in clinical to [facility].F:[phone]    Case ID: [id number]    LIZ Baires, [facility]  Emergency Room   Please contact the SW on the floor in which the patient is staying for any questions or concerns

## 2024-07-17 NOTE — PROGRESS NOTES
[facility]    Hospitalist Progress Note      Assessment & Plan   Meredith Allen is a 83 year old woman who was admitted on 7/16/2024. She lives at home in an independent apartment. PMH significant for chronic low back pain since the 1980s after an injury.  She was recently started on Gabapentin for back pain of which she took 100mg daily for 4 days without significant relief, so titrated up to 200mg 7/15 night as she reports she was instructed by her doctor. On 7/16 morning, she was making her breakfast when she was overcome with a feeling of generalized weakness and dizziness. Patient subsequently fell to the ground but reports she did not lose consciousness.  She called her daughter on the phone and EMS was called.  Patient reporting continued low back pain as well as as acute left hip pain. No acute fracture on xray lumbar spine or bilateral pelvis/hip.      Fall  Near Syncope  Closed Head Injury  Orthostatic hypotension  Would not have expected 200 mg gabapentin to have caused this, but holding gabapentin at this time.   Profoundly orthostatic 7/17 morning. IV fluids given 7/17 and will reassess orthostatics.   Laboratory work up unremarkable.  EKG with NSR.  CXR negative.  CTH/CT C/S revealed a Small left parietal scalp edema/hematoma.   Continue to monitor on telemetry.   PT consulted.      Chronic Low Back Pain  Acute Left Hip Pain  Hx of Radiculopathy  Pt reports chronic low back pain with new right hip pain after her fall today.   XR lumbar spine and bilateral pelvis/hip without acute fractures.   - Tylenol PRN.   - Planning for PT at this time. TCU recommended.   - Discussed with patient. No recent lumbar CT imaging available. If continued pain despite PT, could consider CT imaging and IR consult for SHWETHA. Plan to discuss with patient further.      DM Type 2  Peripheral Neuropathy  Pt reports chronic BLE neuropathy at baseline.  Hgb A1C 8.6 (12/2023).  BS on admission 228.   - start  ISS protocol  - hold Amaryl for now     HTN  - hold pta Toprol XL for now  - holding pta ASA for now s/p head injury     Depression  - continue Lexapro    IDA  CPAP ordered.     DVT Prophylaxis: Enoxaparin (Lovenox) SQ  Code Status: Full Code  Expected discharge: Anticipate hospital stay another 1-2 days pending further evaluation and improvement in symptoms. TCU currently recommended.     Rachel Rausch MD FACP  Hospitalist Service  [facility]      Interval History   Physical therapy work with patient 7/17 morning and patient was profoundly orthostatic.  IV fluids given.  Planning to continue to reassess.  Ongoing work with physical therapy as tolerated.  Depending on response and symptoms, may consider further imaging or evaluation for SHWETHA.    -Data reviewed today: I reviewed all new labs and imaging results over the last 24 hours.     Physical Exam   Temp: 98.1  F (36.7  C) Temp src: Oral BP: 131/67 Pulse: 72   Resp: 16 SpO2: 96 % O2 Device: None (Room air)    Vitals:    07/16/24 0627 07/16/24 1214 07/16/24 1335   Weight: 77.1 kg (170 lb) 77.1 kg (169 lb 15.6 oz) 77 kg (169 lb 12.8 oz)     Vital Signs with Ranges  Temp:  [97.5  F (36.4  C)-99.3  F (37.4  C)] 98.1  F (36.7  C)  Pulse:  [70-80] 72  Resp:  [16-18] 16  BP: (119-149)/(59-80) 131/67  SpO2:  [94 %-97 %] 96 %  I/O last 3 completed shifts:  In: 320 [P.O.:320]  Out: -     Constitutional: Pleasant woman seen resting in bed with daughter at bedside. Alert and oriented x3. No acute distress.   HEENT: NCAT. EOMI. Moist oral mucosa.  Respiratory: Clear to auscultation bilaterally. No crackles or wheezes.  Cardiovascular: Regular rate and rhythm.  Systolic murmur.  GI: Soft, nontender, nondistended. Normoactive bowel sounds.   Musculoskeletal: No evidence left sciatic symptoms at time of exam.  Left and right straight leg test without effect.  No pain to palpation on right or left hip.  No gross deformities.   Neurologic: Alert and oriented x3.   Peripheral neuropathy at baseline.  Did not assess gait.      Medications   Current Facility-Administered Medications   Medication Dose Route Frequency Provider Last Rate Last Admin     Current Facility-Administered Medications   Medication Dose Route Frequency Provider Last Rate Last Admin    escitalopram (LEXAPRO) tablet 10 mg  10 mg Oral Daily Tatiana Mcghee PA-C   10 mg at 07/17/24 0805    lactated ringers BOLUS 500 mL  500 mL Intravenous Once Rachel Freed  mL/hr at 07/17/24 1340 Restarted at 07/17/24 1340    sodium chloride (PF) 0.9% PF flush 3 mL  3 mL Intracatheter Q8H Tatiana Mcghee PA-C   3 mL at 07/17/24 1620       Data   Recent Labs   Lab 07/17/24  1056 07/16/24  0659 07/16/24  0648   WBC 7.4  --  10.3   HGB 13.5  --  13.7   MCV 92  --  90   PLT  --   --  252     --  134*   POTASSIUM 4.8  --  4.6   CHLORIDE 102  --  99   CO2 27  --  24   BUN 22.6  --  29.0*   CR 0.72  --  0.83   ANIONGAP 9  --  11   MILAGROS 8.8  --  9.1   * 223* 228*       No results found for this or any previous visit (from the past 24 hour(s)).

## 2024-07-18 ENCOUNTER — DOCUMENTATION ONLY (OUTPATIENT)
Dept: GERIATRICS | Facility: CLINIC | Age: 84
End: 2024-07-18
Payer: COMMERCIAL

## 2024-07-18 VITALS
HEIGHT: 66 IN | TEMPERATURE: 98 F | DIASTOLIC BLOOD PRESSURE: 62 MMHG | RESPIRATION RATE: 20 BRPM | BODY MASS INDEX: 27.29 KG/M2 | OXYGEN SATURATION: 95 % | SYSTOLIC BLOOD PRESSURE: 128 MMHG | HEART RATE: 85 BPM | WEIGHT: 169.8 LBS

## 2024-07-18 LAB
ANION GAP SERPL CALCULATED.3IONS-SCNC: 10 MMOL/L (ref 7–15)
BASOPHILS # BLD AUTO: 0.1 10E3/UL (ref 0–0.2)
BASOPHILS NFR BLD AUTO: 1 %
BUN SERPL-MCNC: 19 MG/DL (ref 8–23)
CALCIUM SERPL-MCNC: 8.9 MG/DL (ref 8.8–10.4)
CHLORIDE SERPL-SCNC: 98 MMOL/L (ref 98–107)
CREAT SERPL-MCNC: 0.65 MG/DL (ref 0.51–0.95)
EGFRCR SERPLBLD CKD-EPI 2021: 87 ML/MIN/1.73M2
EOSINOPHIL # BLD AUTO: 0.2 10E3/UL (ref 0–0.7)
EOSINOPHIL NFR BLD AUTO: 3 %
ERYTHROCYTE [DISTWIDTH] IN BLOOD BY AUTOMATED COUNT: 13.2 % (ref 10–15)
GLUCOSE SERPL-MCNC: 206 MG/DL (ref 70–99)
HCO3 SERPL-SCNC: 25 MMOL/L (ref 22–29)
HCT VFR BLD AUTO: 39 % (ref 35–47)
HGB BLD-MCNC: 12.9 G/DL (ref 11.7–15.7)
IMM GRANULOCYTES # BLD: 0 10E3/UL
IMM GRANULOCYTES NFR BLD: 0 %
LYMPHOCYTES # BLD AUTO: 2.1 10E3/UL (ref 0.8–5.3)
LYMPHOCYTES NFR BLD AUTO: 29 %
MCH RBC QN AUTO: 30.3 PG (ref 26.5–33)
MCHC RBC AUTO-ENTMCNC: 33.1 G/DL (ref 31.5–36.5)
MCV RBC AUTO: 92 FL (ref 78–100)
MONOCYTES # BLD AUTO: 0.6 10E3/UL (ref 0–1.3)
MONOCYTES NFR BLD AUTO: 9 %
NEUTROPHILS # BLD AUTO: 4.2 10E3/UL (ref 1.6–8.3)
NEUTROPHILS NFR BLD AUTO: 59 %
NRBC # BLD AUTO: 0 10E3/UL
NRBC BLD AUTO-RTO: 0 /100
PLATELET # BLD AUTO: 202 10E3/UL (ref 150–450)
POTASSIUM SERPL-SCNC: 4.3 MMOL/L (ref 3.4–5.3)
RBC # BLD AUTO: 4.26 10E6/UL (ref 3.8–5.2)
SODIUM SERPL-SCNC: 133 MMOL/L (ref 135–145)
WBC # BLD AUTO: 7.2 10E3/UL (ref 4–11)

## 2024-07-18 PROCEDURE — 36415 COLL VENOUS BLD VENIPUNCTURE: CPT | Performed by: INTERNAL MEDICINE

## 2024-07-18 PROCEDURE — 99239 HOSP IP/OBS DSCHRG MGMT >30: CPT | Performed by: INTERNAL MEDICINE

## 2024-07-18 PROCEDURE — 250N000013 HC RX MED GY IP 250 OP 250 PS 637: Performed by: PHYSICIAN ASSISTANT

## 2024-07-18 PROCEDURE — 85025 COMPLETE CBC W/AUTO DIFF WBC: CPT | Performed by: INTERNAL MEDICINE

## 2024-07-18 PROCEDURE — 250N000013 HC RX MED GY IP 250 OP 250 PS 637: Performed by: INTERNAL MEDICINE

## 2024-07-18 PROCEDURE — G0378 HOSPITAL OBSERVATION PER HR: HCPCS

## 2024-07-18 PROCEDURE — 80048 BASIC METABOLIC PNL TOTAL CA: CPT | Performed by: INTERNAL MEDICINE

## 2024-07-18 RX ORDER — ACETAMINOPHEN 325 MG/1
650 TABLET ORAL EVERY 4 HOURS PRN
DISCHARGE
Start: 2024-07-18 | End: 2024-07-19

## 2024-07-18 RX ORDER — OXYCODONE HYDROCHLORIDE 5 MG/1
5 TABLET ORAL EVERY 6 HOURS PRN
Qty: 10 TABLET | Refills: 0 | Status: SHIPPED | OUTPATIENT
Start: 2024-07-18 | End: 2024-07-23

## 2024-07-18 RX ORDER — OXYCODONE HYDROCHLORIDE 5 MG/1
2.5 TABLET ORAL EVERY 6 HOURS PRN
Qty: 5 TABLET | Refills: 0 | Status: SHIPPED | OUTPATIENT
Start: 2024-07-18 | End: 2024-07-25

## 2024-07-18 RX ORDER — SENNOSIDES 8.6 MG
1 TABLET ORAL 2 TIMES DAILY
DISCHARGE
Start: 2024-07-18 | End: 2024-07-19

## 2024-07-18 RX ORDER — GABAPENTIN 100 MG/1
100 CAPSULE ORAL 2 TIMES DAILY
DISCHARGE
Start: 2024-07-18

## 2024-07-18 RX ADMIN — ACETAMINOPHEN 650 MG: 325 TABLET, FILM COATED ORAL at 08:20

## 2024-07-18 RX ADMIN — ESCITALOPRAM OXALATE 10 MG: 10 TABLET ORAL at 08:20

## 2024-07-18 RX ADMIN — OXYCODONE HYDROCHLORIDE 5 MG: 5 TABLET ORAL at 08:20

## 2024-07-18 RX ADMIN — OXYCODONE HYDROCHLORIDE 5 MG: 5 TABLET ORAL at 02:40

## 2024-07-18 ASSESSMENT — ACTIVITIES OF DAILY LIVING (ADL)
ADLS_ACUITY_SCORE: 30

## 2024-07-18 NOTE — PROGRESS NOTES
Patient's After Visit Summary was reviewed with patient and/or NA, packet sent along with pt. And daughter for facility. 2 scripts also included for oxycodone in envelope along with other paperwork.   Patient verbalized understanding of After Visit Summary, recommended follow up and was given an opportunity to ask questions.   Discharge medications sent home with patient/family: YES, No, Not applicable.Scripts sent with pt. And daughter (scripts in envelope)  Discharged with daughter    Pt. PIV was Dc'd, pt.was dressed and put into W/C. Daughter was given packet to give to staff upon arrival to facility. RN showed pain med scripts (2) to daughter. Scripts were put into envelope along with rest of packet info.Daughter stated understanding.Pt. was wheeled down to front of hospital as daughter got car and pulled into front and pt. Was safely placed into car. Pt.safely left hospital en route to care facility.

## 2024-07-18 NOTE — PLAN OF CARE
" Pt is alert and oriented x4,  A x 1 with walker, GB, c/o pain on the left hip, PRN Oxycodone 5 mg given with relief. Pt voids without difficulty, passing  flatus. Plan is to discharge to TCU    BP (!) 144/71 (BP Location: Left arm)   Pulse 76   Temp 98  F (36.7  C) (Oral)   Resp 16   Ht 1.676 m (5' 6\")   Wt 77 kg (169 lb 12.8 oz)   SpO2 93%   BMI 27.41 kg/m      Problem: Adult Inpatient Plan of Care  Goal: Plan of Care Review  Description: The Plan of Care Review/Shift note should be completed every shift.  The Outcome Evaluation is a brief statement about your assessment that the patient is improving, declining, or no change.  This information will be displayed automatically on your shift  note.  Outcome: Progressing  Flowsheets (Taken 7/18/2024 0606)  Outcome Evaluation: Patient's pain improves with PRN Oxycodone 5 mg  Plan of Care Reviewed With: patient  Overall Patient Progress: improving  Goal: Patient-Specific Goal (Individualized)  Description: You can add care plan individualizations to a care plan. Examples of Individualization might be:  \"Parent requests to be called daily at 9am for status\", \"I have a hard time hearing out of my right ear\", or \"Do not touch me to wake me up as it startles  me\".  Outcome: Progressing  Goal: Absence of Hospital-Acquired Illness or Injury  Outcome: Progressing  Intervention: Identify and Manage Fall Risk  Recent Flowsheet Documentation  Taken 7/18/2024 0001 by Clover Márquez RN  Safety Promotion/Fall Prevention:   safety round/check completed   room near nurse's station   room door open   nonskid shoes/slippers when out of bed  Intervention: Prevent Skin Injury  Recent Flowsheet Documentation  Taken 7/18/2024 0001 by Clover Márquez RN  Body Position: position changed independently  Intervention: Prevent Infection  Recent Flowsheet Documentation  Taken 7/18/2024 0028 by Clover Márquez RN  Infection Prevention: hand hygiene promoted  Taken 7/18/2024 0001 " by Clover Márquez RN  Infection Prevention: rest/sleep promoted  Goal: Optimal Comfort and Wellbeing  Outcome: Progressing  Goal: Readiness for Transition of Care  Outcome: Progressing     Problem: Skin Injury Risk Increased  Goal: Skin Health and Integrity  Outcome: Progressing  Intervention: Plan: Nurse Driven Intervention: Moisture Management  Recent Flowsheet Documentation  Taken 7/18/2024 0001 by Clover Márquez RN  Moisture Interventions: Encourage regular toileting  Intervention: Optimize Skin Protection  Recent Flowsheet Documentation  Taken 7/18/2024 0001 by Clover Márquez RN  Head of Bed (HOB) Positioning: HOB at 20-30 degrees   Goal Outcome Evaluation:      Plan of Care Reviewed With: patient    Overall Patient Progress: improvingOverall Patient Progress: improving    Outcome Evaluation: Patient's pain improves with PRN Oxycodone 5 mg

## 2024-07-18 NOTE — PLAN OF CARE
"Physical Therapy Discharge Summary    Reason for therapy discharge:    Discharged to transitional care facility.    Progress towards therapy goal(s). See goals on Care Plan in [facility] electronic health record for goal details.  Goals not met.  Barriers to achieving goals:   discharge from facility.    Therapy recommendation(s):    Continued therapy is recommended.  Rationale/Recommendations:  Per prior PT recommendation, \"Pt mobilizing below her indep baseline req A x 1 for ambulation, demonstrating orthostatic hypotension, unsteadiness and weakness. Pt unable to tolerate gait today due to orthostatic hypotension. Recommend TCU to increase safety and independence with all mobility while closely monitoring vitals to enable pt to return to PLOF\".      "

## 2024-07-18 NOTE — PROGRESS NOTES
"PRIMARY DIAGNOSIS: FALL  OUTPATIENT/OBSERVATION GOALS TO BE MET BEFORE DISCHARGE:  1. Pain Status: Improved-controlled with oral pain medications.    2. Return to near baseline physical activity: No    3. Cleared for discharge by consultants (if involved): No    Discharge Planner Nurse   Safe discharge environment identified: No  Barriers to discharge: Yes       Entered by: Helen Escalona RN 07/18/2024 3:47 PM   VSS, afeb this AM.Lungs clear, + BS x 4 quads.C/O some stiffness of neck from fall the other day.c/o pain, rating it a \"7\". Will give Tylenol and 5 mg oxy this AM.Pt. currently sitting up, eating breakfast. Would like to lay back down when done with breakfast.Up to BR with light assist of 1 along with walker.On TELE, NSR, rate 70's.Will con't to monitor and treat as needed.All labs WNL.  Please review provider order for any additional goals.   Nurse to notify provider when observation goals have been met and patient is ready for discharge.  "

## 2024-07-18 NOTE — DISCHARGE SUMMARY
[facility]    Discharge Summary  Hospitalist    Date of Admission:  7/16/2024  Date of Discharge:  7/18/2024  Discharging Provider: Rachel Rausch MD  Date of Service (when I saw the patient): 07/18/24    Discharge Diagnoses   Fall  Near Syncope  Closed Head Injury  Orthostatic hypotension  Chronic Low Back Pain  Acute Left Hip Pain  Hx of Radiculopathy  Diabetes Mellitus Type 2  Peripheral Neuropathy  Hypertension  Depression  IDA    History of Present Illness   Meredith Allen is a 83 year old woman who was admitted on 7/16/2024. She lives at home in an independent apartment. PMH significant for chronic low back pain since the 1980s after an injury.  She was recently started on Gabapentin for back pain of which she took 100mg daily for 4 days without significant relief, so titrated up to 200mg 7/15 night as she reports she was instructed by her doctor. On 7/16 morning, she was making her breakfast when she was overcome with a feeling of generalized weakness and dizziness. Patient subsequently fell to the ground but reports she did not lose consciousness.  She called her daughter on the phone and EMS was called.  Patient reporting continued low back pain as well as as acute left hip pain. No acute fracture on xray lumbar spine or bilateral pelvis/hip.     Hospital Course   Meredith Allen was admitted on 7/16/2024.  The following problems were addressed during her hospitalization:    Fall  Near Syncope  Closed Head Injury  Orthostatic hypotension  Would not have expected 200 mg gabapentin to have caused this episode, but held gabapentin on admission.    Would recommend continuing gabapentin twice daily and if tolerating can follow-up further with primary care for additional adjustments.  Hold metoprolol at time of discharge pending further outpatient blood pressure follow-up.  Profoundly orthostatic 7/17 morning. IV fluids given 7/17 and subsequently improved.   Laboratory work up  unremarkable.  EKG with NSR.  CXR negative.  CTH/CT C/S revealed a Small left parietal scalp edema/hematoma.   Continue to monitor on telemetry. No concerning events.  PT consulted. Discharged to TCU.     Chronic Low Back Pain  Acute Left Hip Pain  Hx of Radiculopathy  Pt reports chronic low back pain with new right hip pain after her fall.   XR lumbar spine and bilateral pelvis/hip without acute fractures.   - Tylenol PRN.   - Planning for PT at this time. TCU recommended.   - Discussed with patient. No recent lumbar CT imaging available. If continued pain despite PT, could consider CT imaging and IR consult for SHWETHA. She prefers to defer this until after TCU to monitor response / improvement.  - Continue Tylenol as first-line as needed for pain.  Oxycodone 2.5 (for moderate pain) or 5 mg (for severe pain) to be available for next few days.  Recommend scheduled senna while taking oxycodone.  Further adjustments to regimen depending on response.    - Would recommend continuing gabapentin twice daily and if tolerating can follow-up further with primary care for additional adjustments.       DM Type 2  Peripheral Neuropathy  Pt reports chronic BLE neuropathy at baseline.  Hgb A1C 8.6 (12/2023).  BS on admission 228.   Continue home regimen on discharge.     HTN  - hold pta Toprol XL for now. Follow with PCP.     Depression  - continue Lexapro     IDA  CPAP ordered.    Rachel Rausch MD FACP  Hospitalist Service  [facility]      Significant Results and Procedures   Imaging and lab studies below.    Pending Results   N/A    Code Status   Full Code       Primary Care Physician   Lia Saini    Physical Exam   Temp: 98  F (36.7  C) Temp src: Oral BP: 128/62 Pulse: 85   Resp: 20 SpO2: 95 % O2 Device: None (Room air)    Vitals:    07/16/24 0627 07/16/24 1214 07/16/24 1335   Weight: 77.1 kg (170 lb) 77.1 kg (169 lb 15.6 oz) 77 kg (169 lb 12.8 oz)     Vital Signs with Ranges  Temp:  [98  F (36.7   C)-98.3  F (36.8  C)] 98  F (36.7  C)  Pulse:  [72-85] 85  Resp:  [16-20] 20  BP: (128-175)/(62-81) 128/62  SpO2:  [93 %-96 %] 95 %  I/O last 3 completed shifts:  In: 320 [P.O.:320]  Out: -     Constitutional: Pleasant woman seen resting in bed. Alert and oriented x3. No acute distress.   HEENT: NCAT. EOMI. Moist oral mucosa.  Respiratory: Clear to auscultation bilaterally. No crackles or wheezes.  Cardiovascular: Regular rate and rhythm.  Systolic murmur.  GI: Soft, nontender, nondistended. Normoactive bowel sounds.   Musculoskeletal: No evidence left sciatic symptoms at time of exam.  Left and right straight leg test without effect.  No pain to palpation on right or left hip.  No gross deformities.   Neurologic: Alert and oriented x3.  Peripheral neuropathy at baseline.  Did not assess gait.       Discharge Disposition   Discharged to rehabilitation facility  Condition at discharge: Stable    Consultations This Hospital Stay   PHYSICAL THERAPY ADULT IP CONSULT  CARE MANAGEMENT / SOCIAL WORK IP CONSULT  PHYSICAL THERAPY ADULT IP CONSULT  OCCUPATIONAL THERAPY ADULT IP CONSULT    Time Spent on this Encounter   I, Rachel Rausch MD, personally saw the patient today and spent greater than 30 minutes discharging this patient.    Discharge Orders      General info for SNF    Length of Stay Estimate: Short Term Care: Estimated # of Days <30  Condition at Discharge: Improving  Level of care:skilled   Rehabilitation Potential: Good  Admission H&P remains valid and up-to-date: Yes  Recent Chemotherapy: N/A  Use Nursing Home Standing Orders: Yes     Mantoux instructions    Give two-step Mantoux (PPD) Per Facility Policy Yes     Follow Up and recommended labs and tests    Follow up with [facility] physician.  The following labs/tests are recommended: CBC and BMP in next week.  Follow up with primary care within 1 week of discharge from rehab facility.  Recommend following up with back pain and ongoing pain needs.      Reason for your hospital stay    You were brought in for further observation after a fall.  You were found to have orthostatic hypotension which responded after IV fluids and some adjustments to your medications.  Transitional care unit recommended on discharge for rehab and to determine next best steps.  You have been reporting sciatic pain and we will see how you respond to physical therapy.  Could consider lumbar CT and further evaluation including epidural spine injection as outpatient depending on improvement in symptoms.  Continue Tylenol as first-line as needed for pain.  Oxycodone 2.5 (for moderate pain) or 5 mg (for severe pain) to be available for next few days.  Recommend scheduled senna while taking oxycodone.  Further adjustments to regimen depending on response.  Would recommend continuing gabapentin twice daily and if tolerating can follow-up further with primary care for additional adjustments.  Hold metoprolol at time of discharge pending further outpatient blood pressure follow-up.     Activity - Up with nursing assistance     Full Code     Physical Therapy Adult Consult    Evaluate and treat as clinically indicated.    Reason:  Deconditioning, ensure orthostatic hypotension resolved, falls     Occupational Therapy Adult Consult    Evaluate and treat as clinically indicated.    Reason:  Cognitive, ongoing discharge recommendations     Fall precautions     Diet    Follow this diet upon discharge: Regular diet     Discharge Medications   Current Discharge Medication List        START taking these medications    Details   !! oxyCODONE (ROXICODONE) 5 MG tablet Take 1 tablet (5 mg) by mouth every 6 hours as needed for severe pain  Qty: 10 tablet, Refills: 0    Associated Diagnoses: Acute left-sided low back pain with left-sided sciatica      !! oxyCODONE (ROXICODONE) 5 MG tablet Take 0.5 tablets (2.5 mg) by mouth every 6 hours as needed for moderate pain  Qty: 5 tablet, Refills: 0    Associated  Diagnoses: Acute left-sided low back pain with left-sided sciatica      sennosides (SENOKOT) 8.6 MG tablet Take 1 tablet by mouth 2 times daily as long as taking oxycodone to prevent opioid-induced constipation    Associated Diagnoses: Acute left-sided low back pain with left-sided sciatica       !! - Potential duplicate medications found. Please discuss with provider.        CONTINUE these medications which have CHANGED    Details   acetaminophen (TYLENOL) 325 MG tablet Take 2 tablets (650 mg) by mouth every 4 hours as needed for mild pain or other (and adjunct with moderate or severe pain or per patient request)    Associated Diagnoses: Acute left-sided low back pain with left-sided sciatica      gabapentin (NEURONTIN) 100 MG capsule Take 1 capsule (100 mg) by mouth 2 times daily . If tolerating this dose, follow with your primary care to consider additional adjustments.    Associated Diagnoses: Acute left-sided low back pain with left-sided sciatica           CONTINUE these medications which have NOT CHANGED    Details   aspirin 81 MG EC tablet Take 81 mg by mouth daily      calcium-magnesium (CALMAG) 500-250 MG TABS Take 1 tablet by mouth daily.      escitalopram (LEXAPRO) 10 MG tablet Take 10 mg by mouth daily      glimepiride (AMARYL) 1 MG tablet Take 0.5 mg by mouth 2 times daily (before meals) Hold if BS<100 or if not eating      Lidocaine (LIDOCARE) 4 % Patch Place 1 patch onto the skin daily as needed for moderate pain To prevent lidocaine toxicity, patient should be patch free for 12 hrs daily.      melatonin 5 MG tablet Take 5 mg by mouth at bedtime      Multiple Vitamin (MULTI-VITAMIN) per tablet Take 1 tablet by mouth daily.      Vitamin D3 (CHOLECALCIFEROL) 25 mcg (1000 units) tablet Take 1,000 Units by mouth daily      blood glucose (NO BRAND SPECIFIED) test strip Use to test blood sugar 2 times daily or as directed. To accompany: Blood Glucose Monitor Brands: per insurance.  Qty: 100 strip, Refills:  6    Associated Diagnoses: Type 2 diabetes mellitus without complication, without long-term current use of insulin (H)      blood glucose monitoring (NO BRAND SPECIFIED) meter device kit Use to test blood sugar 2 times daily or as directed. Preferred blood glucose meter OR supplies to accompany: Blood Glucose Monitor Brands: per insurance.  Qty: 1 kit, Refills: 0    Associated Diagnoses: Type 2 diabetes mellitus without complication, without long-term current use of insulin (H)      thin (NO BRAND SPECIFIED) lancets Use with lanceting device. To accompany: Blood Glucose Monitor Brands: per insurance.  Qty: 100 each, Refills: 6    Associated Diagnoses: Type 2 diabetes mellitus without complication, without long-term current use of insulin (H)           STOP taking these medications       meloxicam (MOBIC) 7.5 MG tablet Comments:   Reason for Stopping:         metoprolol succinate ER (TOPROL XL) 25 MG 24 hr tablet Comments:   Reason for Stopping:             Allergies   Allergies   Allergen Reactions    Sulfa Antibiotics      Data   Most Recent 3 CBC's:  Recent Labs   Lab Test 07/18/24  0549 07/17/24  1056 07/16/24  0648 07/06/24  0342   WBC 7.2 7.4 10.3 7.0   HGB 12.9 13.5 13.7 14.3   MCV 92 92 90 92     --  252 240      Most Recent 3 BMP's:  Recent Labs   Lab Test 07/18/24  0549 07/17/24  1056 07/16/24  0659 07/16/24  0648   * 138  --  134*   POTASSIUM 4.3 4.8  --  4.6   CHLORIDE 98 102  --  99   CO2 25 27  --  24   BUN 19.0 22.6  --  29.0*   CR 0.65 0.72  --  0.83   ANIONGAP 10 9  --  11   MILAGROS 8.9 8.8  --  9.1   * 168* 223* 228*     Results for orders placed or performed during the hospital encounter of 07/16/24   Head CT w/o contrast    Narrative    EXAM: CT HEAD W/O CONTRAST  7/16/2024 8:07 AM     HISTORY:  fall       COMPARISON:  CTA 12/30/2023, head CT 10/27/2021    TECHNIQUE: Using multidetector thin collimation helical acquisition  technique, axial, coronal and sagittal CT images from the  skull base  to the vertex were obtained without intravenous contrast.   (topogram) image(s) also obtained and reviewed. Dose reduction  techniques were performed.    FINDINGS:  No intracranial hemorrhage, mass effect, or midline shift. No acute  loss of gray-white matter differentiation in the cerebral hemispheres.  Ventricles are proportionate to the cerebral sulci. Clear basal  cisterns. Periventricular hypoattenuation, consistent with chronic  small vessel ischemic disease. Mild generalized cerebral volume loss.    The bony calvaria and the bones of the skull base are normal. Small  left parietal scalp edema/hematoma. Chronic osteitis and complete  opacification of the left sphenoid locule. The remaining paranasal  sinuses and mastoid air cells are clear. Grossly normal orbits.       Impression    IMPRESSION:  1. No acute intracranial pathology. Small left parietal scalp  edema/hematoma.   2. Chronic small vessel ischemic disease and generalized cerebral  volume loss.    GRACIELA CORTEZ MD         SYSTEM ID:  [id number]   CT Cervical Spine w/o Contrast    Narrative    EXAM: CT CERVICAL SPINE W/O CONTRAST  7/16/2024 8:05 AM     HISTORY: fall       COMPARISON: CT 12/30/2023    TECHNIQUE: Using multidetector thin collimation helical acquisition  technique, axial, coronal and sagittal CT images through the cervical  spine were obtained without intravenous contrast. Dose reduction  techniques were used.    FINDINGS:  No evidence of fracture. No prevertebral soft tissue swelling.  Alignment is normal. Vertebral body height is maintained. No  destructive osseous lesions. Moderate degenerative endplate changes  and loss of disc height at C5-6 and C6-7. Mild degenerative endplate  changes and loss of disc height at the other levels. No spinal canal  narrowing at any level. Moderate neural foraminal narrowings at C5-6  and C6-7.    Visualized paraspinous tissues: Unremarkable.      Impression    IMPRESSION:   1. No  evidence of acute fracture or subluxation in the cervical spine.  2. Degenerative changes in the cervical spine as described above.    GRACIELA CORTEZ MD         SYSTEM ID:  [id number]   Chest XR,  PA & LAT    Narrative    CHEST TWO VIEWS   7/16/2024 8:09 AM     HISTORY: Fall.    COMPARISON: 7/31/2012.      Impression    IMPRESSION: No acute cardiopulmonary disease. No visible pneumothorax  or visible displaced fracture.    JEET VASQUEZ MD         SYSTEM ID:  [id number]   XR Lumbar Spine 2/3 Views    Narrative    LUMBAR SPINE 2/3 VIEWS 7/16/2024 12:34 PM     COMPARISON: Lumbar spine CT 7/6/2024.    HISTORY: Low back pain status post fall.      Impression    IMPRESSION: 5 lumbar type vertebrae. Alignment remains normal.  Vertebral body heights normal. No fractures. Mild facet arthropathy  throughout the lumbar spine again noted.    BETSY JEAN MD         SYSTEM ID:  [id number]   XR Pelvis and Hip Bilateral 2 Views    Narrative    XR PELVIS AND HIP BILATERAL 2 VIEWS 7/16/2024 12:34 PM    HISTORY: right hip pain s/p fall    COMPARISON: None.      Impression    IMPRESSION: Osteopenia. No fracture. Very mild degenerative changes in  both hips.    SO PRABHAKAR MD         SYSTEM ID:  [id number]

## 2024-07-18 NOTE — PLAN OF CARE
"PRIMARY DIAGNOSIS: FALL/GEN WKNS  OUTPATIENT/OBSERVATION GOALS TO BE MET BEFORE DISCHARGE:  ADLs back to baseline: No    Activity and level of assistance: A1 gbw    Pain status: Improved-controlled with oral pain medications.    Return to near baseline physical activity: No     Discharge Planner Nurse   Safe discharge environment identified: Yes  Barriers to discharge: Yes       Entered by: Lucila Nicole RN 07/17/2024    A&O x4. VSS, besides hypertensive. RA. Up w/ A1 gbw-- can be unsteady. Had one episode of lightheadedness when getting oob to BR w/ Systolic BP drop from 162 to 130. Pain managed w/ PRN oxy x1 and ice pack to L hip. Effective per pt report. Neuros intact. On tele running NSR. PT and SW following. TCU ref out.   Please review provider order for any additional goals.   Nurse to notify provider when observation goals have been met and patient is ready for discharge.  Goal Outcome Evaluation:      Plan of Care Reviewed With: patient    Overall Patient Progress: improvingOverall Patient Progress: improving    Outcome Evaluation: Pain down to 5/10 post oxy.      Problem: Adult Inpatient Plan of Care  Goal: Plan of Care Review  Description: The Plan of Care Review/Shift note should be completed every shift.  The Outcome Evaluation is a brief statement about your assessment that the patient is improving, declining, or no change.  This information will be displayed automatically on your shift  note.  Outcome: Progressing  Flowsheets (Taken 7/17/2024 2159)  Outcome Evaluation: Pain down to 5/10 post oxy.  Plan of Care Reviewed With: patient  Overall Patient Progress: improving  Goal: Patient-Specific Goal (Individualized)  Description: You can add care plan individualizations to a care plan. Examples of Individualization might be:  \"Parent requests to be called daily at 9am for status\", \"I have a hard time hearing out of my right ear\", or \"Do not touch me to wake me up as it startles  me\".  Outcome: " Progressing  Goal: Absence of Hospital-Acquired Illness or Injury  Outcome: Progressing  Goal: Optimal Comfort and Wellbeing  Outcome: Progressing  Goal: Readiness for Transition of Care  Outcome: Progressing

## 2024-07-18 NOTE — PLAN OF CARE
"PRIMARY DIAGNOSIS: \"GENERIC\" NURSING  OUTPATIENT/OBSERVATION GOALS TO BE MET BEFORE DISCHARGE:  ADLs back to baseline: No    Activity and level of assistance: Up with standby assistance.    Pain status: Improved-controlled with oral pain medications.    Return to near baseline physical activity: No     Discharge Planner Nurse   Safe discharge environment identified: Yes  Barriers to discharge: Yes    Please review provider order for any additional goals.   Nurse to notify provider when observation goals have been met and patient is ready for discharge.  "

## 2024-07-18 NOTE — PROGRESS NOTES
Care Management Discharge Note    Discharge Date: 07/18/2024       Discharge Disposition:  TCU    Discharge Services:  PT    Discharge DME:      Discharge Transportation:  daughter    Private pay costs discussed: insurance costs out of pocket expenses    Does the patient's insurance plan have a 3 day qualifying hospital stay waiver?  Yes     Which insurance plan 3 day waiver is available? Alternative insurance waiver    Will the waiver be used for post-acute placement? Yes    PAS Confirmation Code: [id number]  Patient/family educated on Medicare website which has current facility and service quality ratings:      Education Provided on the Discharge Plan:    Persons Notified of Discharge Plans: MD SHAD  Patient/Family in Agreement with the Plan:      Handoff Referral Completed: No    Additional Information:    Patient accepted at [facility] today. PAS completed. No OBRA LEVEL 2 flagged. Updated MD.     Auth pending     Addendum 1325: Auth obtained. Dates 7/18-7/25. #[id number]. Orders will be faxed. Family transporting. Updated patient at bedside. She can leave at 1500.       LIZ Baires, [facility] Hospital[facility]  Emer[facility]   Please contact the SW on the floor in which the patient is staying for any questions or concerns

## 2024-07-19 ENCOUNTER — TRANSITIONAL CARE UNIT VISIT (OUTPATIENT)
Dept: GERIATRICS | Facility: CLINIC | Age: 84
End: 2024-07-19
Payer: COMMERCIAL

## 2024-07-19 ENCOUNTER — PATIENT OUTREACH (OUTPATIENT)
Dept: CARE COORDINATION | Facility: CLINIC | Age: 84
End: 2024-07-19

## 2024-07-19 VITALS
OXYGEN SATURATION: 94 % | BODY MASS INDEX: 27.49 KG/M2 | HEIGHT: 65 IN | TEMPERATURE: 97.5 F | SYSTOLIC BLOOD PRESSURE: 130 MMHG | WEIGHT: 165 LBS | HEART RATE: 79 BPM | RESPIRATION RATE: 18 BRPM | DIASTOLIC BLOOD PRESSURE: 76 MMHG

## 2024-07-19 DIAGNOSIS — Z86.69 HISTORY OF MIGRAINE: ICD-10-CM

## 2024-07-19 DIAGNOSIS — R41.89 COGNITIVE IMPAIRMENT: ICD-10-CM

## 2024-07-19 DIAGNOSIS — M54.42 ACUTE LEFT-SIDED LOW BACK PAIN WITH LEFT-SIDED SCIATICA: Primary | ICD-10-CM
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
## 2024-07-19 DIAGNOSIS — G47.33 OSA (OBSTRUCTIVE SLEEP APNEA): ICD-10-CM

## 2024-07-19 DIAGNOSIS — I65.23 BILATERAL CAROTID ARTERY STENOSIS: ICD-10-CM

## 2024-07-19 DIAGNOSIS — G89.29 CHRONIC LOW BACK PAIN, UNSPECIFIED BACK PAIN LATERALITY, UNSPECIFIED WHETHER SCIATICA PRESENT: ICD-10-CM

## 2024-07-19 DIAGNOSIS — I95.1 ORTHOSTATIC HYPOTENSION: ICD-10-CM

## 2024-07-19 DIAGNOSIS — I10 ESSENTIAL HYPERTENSION: ICD-10-CM

## 2024-07-19 DIAGNOSIS — F33.9 DEPRESSION, RECURRENT (H): ICD-10-CM

## 2024-07-19 DIAGNOSIS — E78.5 DYSLIPIDEMIA: ICD-10-CM

## 2024-07-19 DIAGNOSIS — Z87.898 HISTORY OF TACHYCARDIA: ICD-10-CM

## 2024-07-19 DIAGNOSIS — S09.90XD CLOSED HEAD INJURY, SUBSEQUENT ENCOUNTER: ICD-10-CM

## 2024-07-19 DIAGNOSIS — M25.552 ACUTE HIP PAIN, LEFT: ICD-10-CM

## 2024-07-19 DIAGNOSIS — E11.9 TYPE 2 DIABETES MELLITUS WITHOUT COMPLICATION, WITHOUT LONG-TERM CURRENT USE OF INSULIN (H): ICD-10-CM

## 2024-07-19 DIAGNOSIS — R53.81 PHYSICAL DECONDITIONING: ICD-10-CM

## 2024-07-19 DIAGNOSIS — E87.1 HYPONATREMIA: ICD-10-CM

## 2024-07-19 DIAGNOSIS — M54.50 CHRONIC LOW BACK PAIN, UNSPECIFIED BACK PAIN LATERALITY, UNSPECIFIED WHETHER SCIATICA PRESENT: ICD-10-CM

## 2024-07-19 DIAGNOSIS — K59.01 SLOW TRANSIT CONSTIPATION: ICD-10-CM

## 2024-07-19 DIAGNOSIS — G47.00 INSOMNIA, UNSPECIFIED TYPE: ICD-10-CM

## 2024-07-19 PROCEDURE — 99310 SBSQ NF CARE HIGH MDM 45: CPT | Performed by: NURSE PRACTITIONER

## 2024-07-19 RX ORDER — POLYETHYLENE GLYCOL 3350 17 G/17G
17 POWDER, FOR SOLUTION ORAL 2 TIMES DAILY PRN
Status: SHIPPED
Start: 2024-07-19 | End: 2024-07-23

## 2024-07-19 RX ORDER — SENNA AND DOCUSATE SODIUM 50; 8.6 MG/1; MG/1
1 TABLET, FILM COATED ORAL 2 TIMES DAILY
Status: SHIPPED
Start: 2024-07-19

## 2024-07-19 RX ORDER — ACETAMINOPHEN 500 MG
1000 TABLET ORAL 3 TIMES DAILY
Status: SHIPPED
Start: 2024-07-19

## 2024-07-19 NOTE — PATIENT INSTRUCTIONS
Orders  Meredith Allen  1940  1) Please ensure glimepiride administered with breakfast and dinner  2) Hold glimepiride if BS<100.    3) Check blood sugars 3 times daily.   4) Start carb controlled diet.   5) CBC and BMP 7/22. dX: hyponatremia, fall  6) Administer senna s 2 tabs STAT now for constipation and 2 tabs tonight if no BM today  7) Administer miralax 17 gram STAT now and 17 gram PRN tonight if no BM today  8). If no BM by tomorrow administer suppository per ANNIE for constipation  9) Change to senna S2 1 tab BID. For constipation  10) Add miralax 17 gram BID PRN for constipation to orders.   11) Change tylenol to 1000 mg TID for pain  12) Start voltaren gel 1%. Apply 4 grams to left hip TID for pain  KATIE Chinchilla CNP on 7/19/2024 at 10:56 AM

## 2024-07-19 NOTE — LETTER
7/19/2024      Meredith Allen  [address]  [facility] [address]        [facility] GERIATRICS    PRIMARY CARE PROVIDER AND CLINIC:  Lia Saini MD, [address] [address] / [facility] [address]  Chief Complaint   Patient presents with     Hospital F/U      [place] Medical Record Number:  [id number]  Place of Service where encounter took place:  [facility]  (U) [[id number]]    Meredith Allen  is a 83 year old  (1940), admitted to the above facility from  [facility]. Hospital stay 7/16/24 through 7/18/24..   HPI:    PMH significant for HTN, dyslipidemia, tachycardia, BPPV, type 2 diabetes, migraine, depression, chronic back pain, IDA    Summary of recent hospitalization:  Patient was hospitalized at [facility] from 7/16/2024 to 7/18/2024 for fall, acute left hip pain. Patient presented to the ED for evaluation of fall. This coincided with increased dose of gabapentin patient took at home. Lab evaluation in the ED significant for sodium 134, essentially normal CBC, negative COVID 19, influenza A/B, RSV, negative UA. Chest xray negative for acute findings. Head CT negative for acute intracranial pathology, small left parietal scalp edema/hematoma. CT cervical spine negative for acute fracture or subluxation of cervical spine. Xray of lumbar spine negative for acute findings. Xray of pelvis and bilateral hips negative for acute fracture. Patient found to be orthostatic inpatient and received IV hydration and improved. Sodium 133 at last check on 7/18. PTA meloxicam and metoprolol discontinued inpatient. Discharged to TCU for physical rehabilitation and medical management.     Reviewed H&P, ER note, daily note, labs and imaging. Discharge summary is not complete.    Today patient was seen for admission visit in the TCU. She is forgetful but oriented x3. She lives alone. She reports chronic headaches- not worse than baseline. She continues to report  pain to left hip rating it 4-5/10. She reports chronic dizziness/lightheadedness that is positional. She denies SOB, CP, dysuria/trouble urinating. She is not sure when last BM was -she believes prior to hospitalization. Reviewed nursing notes and facility and inpatient and has not had BM since hospital admission. She denies abdominal pain. We discussed what to expect in the TCU.     Reviewed facility EMR including medications, recent nursing notes and vital signs. Discussed plan of care with nursing.    CODE STATUS/ADVANCE DIRECTIVES DISCUSSION:  CPR/Full code   ALLERGIES:   Allergies   Allergen Reactions     Metformin Nausea     Sulfa Antibiotics       PAST MEDICAL HISTORY:   Past Medical History:   Diagnosis Date     Back pain      Hyperlipidemia      Migraines       PAST SURGICAL HISTORY:   has a past surgical history that includes Hysterectomy; Cholecystectomy; colonoscopy; Cystectomy ovarian benign; Hysterectomy total abdominal, repair bladder, combined; and Phacoemulsification clear cornea with standard intraocular lens implant (2/15/2012).  FAMILY HISTORY: family history is not on file.  SOCIAL HISTORY:   reports that she has never smoked. She does not have any smokeless tobacco history on file. She reports that she does not drink alcohol.  Patient's living condition: lives alone    Post Discharge Medication Reconciliation Status:   MED REC REQUIRED  Post Medication Reconciliation Status:  Discharge medications reconciled and changed, see notes/orders     Current Outpatient Medications   Medication Sig Dispense Refill     acetaminophen (TYLENOL) 500 MG tablet Take 2 tablets (1,000 mg) by mouth 3 times daily       aspirin 81 MG EC tablet Take 81 mg by mouth daily       calcium-magnesium (CALMAG) 500-250 MG TABS Take 1 tablet by mouth daily.       diclofenac (VOLTAREN) 1 % topical gel Apply 4 g topically 4 times daily       escitalopram (LEXAPRO) 10 MG tablet Take 10 mg by mouth daily       gabapentin  (NEURONTIN) 100 MG capsule Take 1 capsule (100 mg) by mouth 2 times daily . If tolerating this dose, follow with your primary care to consider additional adjustments.       glimepiride (AMARYL) 1 MG tablet Take 0.5 mg by mouth 2 times daily (before meals) Hold if BS<100 or if not eating       Lidocaine (LIDOCARE) 4 % Patch Place 1 patch onto the skin daily as needed for moderate pain To prevent lidocaine toxicity, patient should be patch free for 12 hrs daily.       melatonin 5 MG tablet Take 5 mg by mouth at bedtime       Multiple Vitamin (MULTI-VITAMIN) per tablet Take 1 tablet by mouth daily.       oxyCODONE (ROXICODONE) 5 MG tablet Take 1 tablet (5 mg) by mouth every 6 hours as needed for severe pain 10 tablet 0     oxyCODONE (ROXICODONE) 5 MG tablet Take 0.5 tablets (2.5 mg) by mouth every 6 hours as needed for moderate pain 5 tablet 0     polyethylene glycol (MIRALAX) 17 GM/Dose powder Take 17 g by mouth 2 times daily as needed for constipation       SENNA-docusate sodium (SENNA S) 8.6-50 MG tablet Take 1 tablet by mouth 2 times daily       Vitamin D3 (CHOLECALCIFEROL) 25 mcg (1000 units) tablet Take 1,000 Units by mouth daily       blood glucose (NO BRAND SPECIFIED) test strip Use to test blood sugar 2 times daily or as directed. To accompany: Blood Glucose Monitor Brands: per insurance. 100 strip 6     blood glucose monitoring (NO BRAND SPECIFIED) meter device kit Use to test blood sugar 2 times daily or as directed. Preferred blood glucose meter OR supplies to accompany: Blood Glucose Monitor Brands: per insurance. 1 kit 0     thin (NO BRAND SPECIFIED) lancets Use with lanceting device. To accompany: Blood Glucose Monitor Brands: per insurance. 100 each 6     No current facility-administered medications for this visit.       ROS:  10 point ROS of systems including Constitutional, Eyes, Respiratory, Cardiovascular, Gastroenterology, Genitourinary, Integumentary, Musculoskeletal, Psychiatric were all negative  "except for pertinent positives noted in my HPI.    Vitals:  /76   Pulse 79   Temp 97.5  F (36.4  C)   Resp 18   Ht 1.651 m (5' 5\")   Wt 74.8 kg (165 lb)   SpO2 94%   BMI 27.46 kg/m    Exam:  GENERAL APPEARANCE:  Alert, in NAD  HEENT: normocephalic, moist mucous membranes, nose without drainage or crusting  RESP:  respiratory effort normal, no respiratory distress, Lung sounds clear, patient is on RA  CV: auscultation of heart done, rate and rhythm regular  ABDOMEN: + bowel sounds, soft, nontender, no grimacing or guarding with palpation.  M/S:  no lower extremity edema; able to move both legs freely without significant pain  SKIN:  Inspection and palpation of skin and subcutaneous tissue: skin warm, dry without rashes  NEURO: cranial nerves 2-12 grossly intact and at patient's baseline; normal speech  PSYCH: oriented x 3, forgetful, affect and mood normal      Lab/Diagnostic data:  Labs done in SNF are in [facility]. Please refer to them using [employer]/Care Everywhere. and Recent labs in [facility] reviewed by me today.     ASSESSMENT/PLAN:  Closed head injury  Acute left hip pain  Headache with history of migraine headache  Chronic low back pain  Fall, subsequent encounter  PTA meloxicam discontinued inpatient  Head CT, cervical, lumbar, pelvic and hip xrays negative for acute findings  Pain today 4-5/10  Reports chronic headaches no worse than at baseline  Plan: Change tylenol to 1000 mg TID, continue gabapentin 100 mg BID, lidocaine patch daily, oxycodone 2.5-5 mg q6h PRN. Add voltaren gel to left hip TID. Continue therapy. If pain continues to persist consider IR. CBC 7/22.     Orthostatic hypotension  Essential hypertension  History of Tachycardia  Received IV hydration inpatient  PTA metoprolol discontinued inpatient  Reports chronic dizziness/lightheadedness with positional changes  Limited BP since admission 130/76, HR 79  Plan: Not currently on medical management. Monitor symptoms. Encourage fluids.   "   Hyponatremia  Mild  Sodium 133 on 7/18/2024  Plan: BMP 7/22.    Mild to moderate carotid bifurcation stenosis per CTA neck on 12/30/2023  Mild stenosis at left vertebral artery origin per CTA neck on 12/30/2023  Dyslipidemia  Plan: Continue aspirin 81 mg daily. Not currently on statin- per care everywhere looks like she has an allergy to it. Follow up with PCP.     Type 2 diabetes  Hemoglobin A1c 7.6% on 4/10/2024  BS limited 181-169  Plan: Continue glimepiride 0.5 mg BID with meals. Hold if BS<100.  Check blood sugars 3 times daily. Continue carb controlled diet.      Depression  Insomnia  Plan: Continue escitalopram 10 mg daily, melatonin 5 mg at bedtime. Monitor mood and symptoms. Monitor sleep. Consider ACP referral as needed.      Slow transit constipation  Per nursing documentation inpatient, patient has not had BM during hospitalization (been 4+ days)  Plan: Administer senna s 2 tabs STAT now and tonight if needed and miralax 17 gram STAT and tonight if needed. If no BM by tomorrow administer suppository per ANNIE. Change to senna S2 1 tab BID. Monitor bowels closely.     IDA   Plan: Monitor oxygen saturations PRN while sleeping. Follow up with sleep clinic outpatient.     Cognitive impairment  SLUMS 22/30, CPT 5/5.6 during TCU stay in 1/2024  Oriented x3 but forgetful during visit today  Plan: OCCUPATIONAL THERAPY to complete cognitive testing for safe discharge planning. Nursing to assist with cares, meals, medication assistance, activities.     Physical deconditioning  Secondary to recent hospitalization, acute/ chronic medical conditions as above  Plan: Encourage participation in physical therapy/occupational therapy for strengthening and deconditioning. Discharge planning per their recommendation. Social work to assist with d/c planning.      Disclaimer: This note may contain text created using speech-recognition software and may contain unintended word substitutions.        Total time spent with patient  visit at the skilled nursing facility was 48 minutes including patient visit, review of past records, medication reconciliation, review of admission orders, discussion with nursing regarding plan of care.    Electronically signed by:  KATIE Chinchilla CNP                   Sincerely,        KATIE Chinchilla CNP

## 2024-07-19 NOTE — PROGRESS NOTES
Connected Care Resource Center: Connected [facility] Resource [facility]    Background: Transitional Care Management program identified per system criteria and reviewed by Connected Care Resource Center team for possible outreach.    Assessment: Upon chart review, CCRC Team member will not proceed with patient outreach related to this episode of Transitional Care Management program due to reason below:    Non-[facility] TCU: CCRC team member noted patient discharged to TCU/ARU/LTACH. Patient is not established with a [facility] currently supported by Primary Care-Care Coordination therefore handoff to Primary Care-Care Coordination is not appropriate at this time.    Plan: Transitional Care Management episode addressed appropriately per reason noted above.      ELOISE Gary  [facility], [facility]    *Connected Care Resource Team does NOT follow patient ongoing. Referrals are identified based on internal discharge reports and the outreach is to ensure patient has an understanding of their discharge instructions.

## 2024-07-19 NOTE — PROGRESS NOTES
[facility] GERIATRICS    PRIMARY CARE PROVIDER AND CLINIC:  Lia Saini MD, [address] [facility] / [facility] [address]  Chief Complaint   Patient presents with    Hospital F/U      [place] Medical Record Number:  [id number]  Place of Service where encounter took place:  [facility]  (U) [[id number]]    Meredith Allen  is a 83 year old  (1940), admitted to the above facility from  [facility]. Hospital stay 7/16/24 through 7/18/24..   HPI:    PMH significant for HTN, dyslipidemia, tachycardia, BPPV, type 2 diabetes, migraine, depression, chronic back pain, IDA    Summary of recent hospitalization:  Patient was hospitalized at [facility] from 7/16/2024 to 7/18/2024 for fall, acute left hip pain. Patient presented to the ED for evaluation of fall. This coincided with increased dose of gabapentin patient took at home. Lab evaluation in the ED significant for sodium 134, essentially normal CBC, negative COVID 19, influenza A/B, RSV, negative UA. Chest xray negative for acute findings. Head CT negative for acute intracranial pathology, small left parietal scalp edema/hematoma. CT cervical spine negative for acute fracture or subluxation of cervical spine. Xray of lumbar spine negative for acute findings. Xray of pelvis and bilateral hips negative for acute fracture. Patient found to be orthostatic inpatient and received IV hydration and improved. Sodium 133 at last check on 7/18. PTA meloxicam and metoprolol discontinued inpatient. Discharged to TCU for physical rehabilitation and medical management.     Reviewed H&P, ER note, daily note, labs and imaging. Discharge summary is not complete.    Today patient was seen for admission visit in the TCU. She is forgetful but oriented x3. She lives alone. She reports chronic headaches- not worse than baseline. She continues to report pain to left hip rating it 4-5/10. She reports chronic dizziness/lightheadedness that is  positional. She denies SOB, CP, dysuria/trouble urinating. She is not sure when last BM was -she believes prior to hospitalization. Reviewed nursing notes and facility and inpatient and has not had BM since hospital admission. She denies abdominal pain. We discussed what to expect in the TCU.     Reviewed facility EMR including medications, recent nursing notes and vital signs. Discussed plan of care with nursing.    CODE STATUS/ADVANCE DIRECTIVES DISCUSSION:  CPR/Full code   ALLERGIES:   Allergies   Allergen Reactions    Metformin Nausea    Sulfa Antibiotics       PAST MEDICAL HISTORY:   Past Medical History:   Diagnosis Date    Back pain     Hyperlipidemia     Migraines       PAST SURGICAL HISTORY:   has a past surgical history that includes Hysterectomy; Cholecystectomy; colonoscopy; Cystectomy ovarian benign; Hysterectomy total abdominal, repair bladder, combined; and Phacoemulsification clear cornea with standard intraocular lens implant (2/15/2012).  FAMILY HISTORY: family history is not on file.  SOCIAL HISTORY:   reports that she has never smoked. She does not have any smokeless tobacco history on file. She reports that she does not drink alcohol.  Patient's living condition: lives alone    Post Discharge Medication Reconciliation Status:   MED REC REQUIRED  Post Medication Reconciliation Status:  Discharge medications reconciled and changed, see notes/orders     Current Outpatient Medications   Medication Sig Dispense Refill    acetaminophen (TYLENOL) 500 MG tablet Take 2 tablets (1,000 mg) by mouth 3 times daily      aspirin 81 MG EC tablet Take 81 mg by mouth daily      calcium-magnesium (CALMAG) 500-250 MG TABS Take 1 tablet by mouth daily.      diclofenac (VOLTAREN) 1 % topical gel Apply 4 g topically 4 times daily      escitalopram (LEXAPRO) 10 MG tablet Take 10 mg by mouth daily      gabapentin (NEURONTIN) 100 MG capsule Take 1 capsule (100 mg) by mouth 2 times daily . If tolerating this dose, follow  with your primary care to consider additional adjustments.      glimepiride (AMARYL) 1 MG tablet Take 0.5 mg by mouth 2 times daily (before meals) Hold if BS<100 or if not eating      Lidocaine (LIDOCARE) 4 % Patch Place 1 patch onto the skin daily as needed for moderate pain To prevent lidocaine toxicity, patient should be patch free for 12 hrs daily.      melatonin 5 MG tablet Take 5 mg by mouth at bedtime      Multiple Vitamin (MULTI-VITAMIN) per tablet Take 1 tablet by mouth daily.      oxyCODONE (ROXICODONE) 5 MG tablet Take 1 tablet (5 mg) by mouth every 6 hours as needed for severe pain 10 tablet 0    oxyCODONE (ROXICODONE) 5 MG tablet Take 0.5 tablets (2.5 mg) by mouth every 6 hours as needed for moderate pain 5 tablet 0    polyethylene glycol (MIRALAX) 17 GM/Dose powder Take 17 g by mouth 2 times daily as needed for constipation      SENNA-docusate sodium (SENNA S) 8.6-50 MG tablet Take 1 tablet by mouth 2 times daily      Vitamin D3 (CHOLECALCIFEROL) 25 mcg (1000 units) tablet Take 1,000 Units by mouth daily      blood glucose (NO BRAND SPECIFIED) test strip Use to test blood sugar 2 times daily or as directed. To accompany: Blood Glucose Monitor Brands: per insurance. 100 strip 6    blood glucose monitoring (NO BRAND SPECIFIED) meter device kit Use to test blood sugar 2 times daily or as directed. Preferred blood glucose meter OR supplies to accompany: Blood Glucose Monitor Brands: per insurance. 1 kit 0    thin (NO BRAND SPECIFIED) lancets Use with lanceting device. To accompany: Blood Glucose Monitor Brands: per insurance. 100 each 6     No current facility-administered medications for this visit.       ROS:  10 point ROS of systems including Constitutional, Eyes, Respiratory, Cardiovascular, Gastroenterology, Genitourinary, Integumentary, Musculoskeletal, Psychiatric were all negative except for pertinent positives noted in my HPI.    Vitals:  /76   Pulse 79   Temp 97.5  F (36.4  C)   Resp 18   " Ht 1.651 m (5' 5\")   Wt 74.8 kg (165 lb)   SpO2 94%   BMI 27.46 kg/m    Exam:  GENERAL APPEARANCE:  Alert, in NAD  HEENT: normocephalic, moist mucous membranes, nose without drainage or crusting  RESP:  respiratory effort normal, no respiratory distress, Lung sounds clear, patient is on RA  CV: auscultation of heart done, rate and rhythm regular  ABDOMEN: + bowel sounds, soft, nontender, no grimacing or guarding with palpation.  M/S:  no lower extremity edema; able to move both legs freely without significant pain  SKIN:  Inspection and palpation of skin and subcutaneous tissue: skin warm, dry without rashes  NEURO: cranial nerves 2-12 grossly intact and at patient's baseline; normal speech  PSYCH: oriented x 3, forgetful, affect and mood normal      Lab/Diagnostic data:  Labs done in SNF are in [facility]. Please refer to them using [employer]Care Everywhere. and Recent labs in [facility] reviewed by me today.     ASSESSMENT/PLAN:  Closed head injury  Acute left hip pain  Headache with history of migraine headache  Chronic low back pain  Fall, subsequent encounter  PTA meloxicam discontinued inpatient  Head CT, cervical, lumbar, pelvic and hip xrays negative for acute findings  Pain today 4-5/10  Reports chronic headaches no worse than at baseline  Plan: Change tylenol to 1000 mg TID, continue gabapentin 100 mg BID, lidocaine patch daily, oxycodone 2.5-5 mg q6h PRN. Add voltaren gel to left hip TID. Continue therapy. If pain continues to persist consider IR. CBC 7/22.     Orthostatic hypotension  Essential hypertension  History of Tachycardia  Received IV hydration inpatient  PTA metoprolol discontinued inpatient  Reports chronic dizziness/lightheadedness with positional changes  Limited BP since admission 130/76, HR 79  Plan: Not currently on medical management. Monitor symptoms. Encourage fluids.     Hyponatremia  Mild  Sodium 133 on 7/18/2024  Plan: BMP 7/22.    Mild to moderate carotid bifurcation stenosis per CTA " neck on 12/30/2023  Mild stenosis at left vertebral artery origin per CTA neck on 12/30/2023  Dyslipidemia  Plan: Continue aspirin 81 mg daily. Not currently on statin- per care everywhere looks like she has an allergy to it. Follow up with PCP.     Type 2 diabetes  Hemoglobin A1c 7.6% on 4/10/2024  BS limited 181-169  Plan: Continue glimepiride 0.5 mg BID with meals. Hold if BS<100.  Check blood sugars 3 times daily. Continue carb controlled diet.      Depression  Insomnia  Plan: Continue escitalopram 10 mg daily, melatonin 5 mg at bedtime. Monitor mood and symptoms. Monitor sleep. Consider ACP referral as needed.      Slow transit constipation  Per nursing documentation inpatient, patient has not had BM during hospitalization (been 4+ days)  Plan: Administer senna s 2 tabs STAT now and tonight if needed and miralax 17 gram STAT and tonight if needed. If no BM by tomorrow administer suppository per ANNIE. Change to senna S2 1 tab BID. Monitor bowels closely.     IDA   Plan: Monitor oxygen saturations PRN while sleeping. Follow up with sleep clinic outpatient.     Cognitive impairment  SLUMS 22/30, CPT 5/5.6 during TCU stay in 1/2024  Oriented x3 but forgetful during visit today  Plan: OCCUPATIONAL THERAPY to complete cognitive testing for safe discharge planning. Nursing to assist with cares, meals, medication assistance, activities.     Physical deconditioning  Secondary to recent hospitalization, acute/ chronic medical conditions as above  Plan: Encourage participation in physical therapy/occupational therapy for strengthening and deconditioning. Discharge planning per their recommendation. Social work to assist with d/c planning.      Disclaimer: This note may contain text created using speech-recognition software and may contain unintended word substitutions.        Total time spent with patient visit at the skilled nursing facility was 48 minutes including patient visit, review of past records, medication  reconciliation, review of admission orders, discussion with nursing regarding plan of care.    Electronically signed by:  KATIE Chinchilla CNP

## 2024-07-19 NOTE — PROGRESS NOTES
Care Management Follow Up    Length of Stay (days): 0    Expected Discharge Date: 07/18/2024       Additional Information:  [employer] called inquiring about where pt discharge too.  Sw informed.    LIZ Dahl, [facility]  Inpatient Care Coordination  [facility]  [phone]      SKIP DAHL

## 2024-07-22 ENCOUNTER — LAB REQUISITION (OUTPATIENT)
Dept: LAB | Facility: CLINIC | Age: 84
End: 2024-07-22
Payer: COMMERCIAL

## 2024-07-22 DIAGNOSIS — E87.1 HYPO-OSMOLALITY AND HYPONATREMIA: ICD-10-CM
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
## 2024-07-23 ENCOUNTER — TRANSITIONAL CARE UNIT VISIT (OUTPATIENT)
Dept: GERIATRICS | Facility: CLINIC | Age: 84
End: 2024-07-23
Payer: COMMERCIAL

## 2024-07-23 VITALS
RESPIRATION RATE: 16 BRPM | HEART RATE: 84 BPM | SYSTOLIC BLOOD PRESSURE: 127 MMHG | OXYGEN SATURATION: 92 % | TEMPERATURE: 98.2 F | WEIGHT: 167.4 LBS | BODY MASS INDEX: 27.86 KG/M2 | DIASTOLIC BLOOD PRESSURE: 73 MMHG

## 2024-07-23 DIAGNOSIS — S09.90XD CLOSED HEAD INJURY, SUBSEQUENT ENCOUNTER: ICD-10-CM

## 2024-07-23 DIAGNOSIS — E11.9 TYPE 2 DIABETES MELLITUS WITHOUT COMPLICATION, WITHOUT LONG-TERM CURRENT USE OF INSULIN (H): ICD-10-CM

## 2024-07-23 DIAGNOSIS — E87.1 HYPONATREMIA: ICD-10-CM

## 2024-07-23 DIAGNOSIS — I10 ESSENTIAL HYPERTENSION: ICD-10-CM

## 2024-07-23 DIAGNOSIS — I65.23 BILATERAL CAROTID ARTERY STENOSIS: ICD-10-CM

## 2024-07-23 DIAGNOSIS — Z86.69 HISTORY OF MIGRAINE: ICD-10-CM

## 2024-07-23 DIAGNOSIS — M25.552 ACUTE HIP PAIN, LEFT: ICD-10-CM

## 2024-07-23 DIAGNOSIS — F33.9 DEPRESSION, RECURRENT (H): ICD-10-CM

## 2024-07-23 DIAGNOSIS — Z87.898 HISTORY OF TACHYCARDIA: ICD-10-CM

## 2024-07-23 DIAGNOSIS — G89.29 CHRONIC LOW BACK PAIN, UNSPECIFIED BACK PAIN LATERALITY, UNSPECIFIED WHETHER SCIATICA PRESENT: ICD-10-CM

## 2024-07-23 DIAGNOSIS — R41.89 COGNITIVE IMPAIRMENT: ICD-10-CM

## 2024-07-23 DIAGNOSIS — R53.81 PHYSICAL DECONDITIONING: ICD-10-CM

## 2024-07-23 DIAGNOSIS — E78.5 DYSLIPIDEMIA: ICD-10-CM
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
## 2024-07-23 DIAGNOSIS — G47.00 INSOMNIA, UNSPECIFIED TYPE: ICD-10-CM

## 2024-07-23 DIAGNOSIS — K59.01 SLOW TRANSIT CONSTIPATION: ICD-10-CM

## 2024-07-23 DIAGNOSIS — I95.1 ORTHOSTATIC HYPOTENSION: ICD-10-CM

## 2024-07-23 DIAGNOSIS — M54.50 CHRONIC LOW BACK PAIN, UNSPECIFIED BACK PAIN LATERALITY, UNSPECIFIED WHETHER SCIATICA PRESENT: ICD-10-CM

## 2024-07-23 DIAGNOSIS — G47.33 OSA (OBSTRUCTIVE SLEEP APNEA): ICD-10-CM

## 2024-07-23 DIAGNOSIS — M54.42 ACUTE LEFT-SIDED LOW BACK PAIN WITH LEFT-SIDED SCIATICA: Primary | ICD-10-CM

## 2024-07-23 LAB
ANION GAP SERPL CALCULATED.3IONS-SCNC: 13 MMOL/L (ref 7–15)
BUN SERPL-MCNC: 18.4 MG/DL (ref 8–23)
CALCIUM SERPL-MCNC: 9.1 MG/DL (ref 8.8–10.4)
CHLORIDE SERPL-SCNC: 99 MMOL/L (ref 98–107)
CREAT SERPL-MCNC: 0.66 MG/DL (ref 0.51–0.95)
EGFRCR SERPLBLD CKD-EPI 2021: 87 ML/MIN/1.73M2
ERYTHROCYTE [DISTWIDTH] IN BLOOD BY AUTOMATED COUNT: 13.5 % (ref 10–15)
GLUCOSE SERPL-MCNC: 142 MG/DL (ref 70–99)
HCO3 SERPL-SCNC: 26 MMOL/L (ref 22–29)
HCT VFR BLD AUTO: 41.1 % (ref 35–47)
HGB BLD-MCNC: 13.2 G/DL (ref 11.7–15.7)
MCH RBC QN AUTO: 29.9 PG (ref 26.5–33)
MCHC RBC AUTO-ENTMCNC: 32.1 G/DL (ref 31.5–36.5)
MCV RBC AUTO: 93 FL (ref 78–100)
PLATELET # BLD AUTO: 254 10E3/UL (ref 150–450)
POTASSIUM SERPL-SCNC: 3.9 MMOL/L (ref 3.4–5.3)
RBC # BLD AUTO: 4.41 10E6/UL (ref 3.8–5.2)
SODIUM SERPL-SCNC: 138 MMOL/L (ref 135–145)
WBC # BLD AUTO: 5.7 10E3/UL (ref 4–11)

## 2024-07-23 PROCEDURE — 99309 SBSQ NF CARE MODERATE MDM 30: CPT | Performed by: NURSE PRACTITIONER

## 2024-07-23 PROCEDURE — 80048 BASIC METABOLIC PNL TOTAL CA: CPT | Mod: ORL | Performed by: NURSE PRACTITIONER

## 2024-07-23 PROCEDURE — 85027 COMPLETE CBC AUTOMATED: CPT | Mod: ORL | Performed by: NURSE PRACTITIONER

## 2024-07-23 PROCEDURE — 36415 COLL VENOUS BLD VENIPUNCTURE: CPT | Mod: ORL | Performed by: NURSE PRACTITIONER

## 2024-07-23 RX ORDER — POLYETHYLENE GLYCOL 3350 17 G/17G
17 POWDER, FOR SOLUTION ORAL DAILY
Status: SHIPPED
Start: 2024-07-23

## 2024-07-23 RX ORDER — OXYCODONE HYDROCHLORIDE 5 MG/1
TABLET ORAL
Qty: 20 TABLET | Refills: 0 | Status: SHIPPED | OUTPATIENT
Start: 2024-07-23 | End: 2024-07-25

## 2024-07-23 NOTE — PROGRESS NOTES
[facility] GERIATRICS    Chief Complaint   Patient presents with    Nursing Home Acute     HPI:  Meredith Allen is a 83 year old  (1940), who is being seen today for an episodic care visit at: [facility]  ([facility]) [[id number]].     PMH significant for HTN, dyslipidemia, tachycardia, BPPV, type 2 diabetes, migraine, depression, chronic back pain, IDA     Summary of recent hospitalization:  Patient was hospitalized at [facility] from 7/16/2024 to 7/18/2024 for fall, acute left hip pain. Patient presented to the ED for evaluation of fall. This coincided with increased dose of gabapentin patient took at home. Lab evaluation in the ED significant for sodium 134, essentially normal CBC, negative COVID 19, influenza A/B, RSV, negative UA. Chest xray negative for acute findings. Head CT negative for acute intracranial pathology, small left parietal scalp edema/hematoma. CT cervical spine negative for acute fracture or subluxation of cervical spine. Xray of lumbar spine negative for acute findings. Xray of pelvis and bilateral hips negative for acute fracture. Patient found to be orthostatic inpatient and received IV hydration and improved. Sodium 133 at last check on 7/18. PTA meloxicam and metoprolol discontinued inpatient. Discharged to TCU for physical rehabilitation and medical management.     Today patient was seen for routine follow-up in the TCU.  She reports ongoing severe left hip pain at times.  She tells me that she had a very rough morning yesterday, she was crying when she woke up due to the pain.  This was discussed with the nurse last night who administered a dose of oxycodone during the middle of the night, patient this morning reports her pain was much better.  She would like a dose scheduled overnight for now.  Pain continues to be located in her left hip.  She denies shortness of breath, chest pain, dysuria/trouble urinating.  Her bowels are moving slow, about every 2 to 3 days,  last bowel movement was yesterday.  She does feel some abdominal distention from constipation, but denies abdominal pain.  She is eating okay.  She reports chronic lightheadedness/dizziness, no worse than baseline.  She continues to work on staying hydrated.  Patient continues to work with therapy.    Allergies, and PMH/PSH reviewed in EPIC today.  REVIEW OF SYSTEMS:  6 point ROS of systems including Constitutional, Respiratory, Cardiovascular, Gastroenterology, Genitourinary,  Musculoskeletal were all negative except for pertinent positives noted in my HPI.    Objective:   /73   Pulse 84   Temp 98.2  F (36.8  C)   Resp 16   Wt 75.9 kg (167 lb 6.4 oz)   SpO2 92%   BMI 27.86 kg/m    GENERAL APPEARANCE:  Alert, in NAD  HEENT: normocephalic, moist mucous membranes, nose without drainage or crusting  RESP:  respiratory effort normal, no respiratory distress, Lung sounds clear, patient is on RA  CV: auscultation of heart done, rate and rhythm regular.  ABDOMEN: + bowel sounds, soft, nontender, no grimacing or guarding with palpation.  M/S:   no lower extremity edema  NEURO: cranial nerves 2-12 grossly intact and at patient's baseline; no facial asymmetry, no speech deficits and able to follow directions  PSYCH:  affect and mood normal      Labs done in SNF are in [facility]. Please refer to them using [employer] Everywhere. and Recent labs in [facility] reviewed by me today.     Assessment/Plan:  Closed head injury  Acute left hip pain  Headache with history of migraine headache  Chronic low back pain  Fall, subsequent encounter  PTA meloxicam discontinued inpatient  Head CT, cervical, lumbar, pelvic and hip xrays negative for acute findings  Pain to left hip is severe at times  Plan: Schedule oxycodone 5 mg at 4AM and continue oxycodone 2.5-5 mg q6h PRN. Continue tylenol 1000 mg TID, continue gabapentin 100 mg BID, lidocaine patch daily, voltaren gel to left hip TID. Continue therapy. If pain continues to  persist consider IR.     Orthostatic hypotension  Essential hypertension  History of Tachycardia  Received IV hydration inpatient  PTA metoprolol discontinued inpatient  Continues to experience chronic lightheadedness/dizziness, no worse than baseline  -145  Plan: Not currently on medical management. Monitor symptoms. Encourage fluids.     Hyponatremia  Mild  Sodium 133 on 7/18/2024  Plan: BMP pending today.     Mild to moderate carotid bifurcation stenosis per CTA neck on 12/30/2023  Mild stenosis at left vertebral artery origin per CTA neck on 12/30/2023  Dyslipidemia  Plan: Continue aspirin 81 mg daily. Not currently on statin- per care everywhere looks like she has an allergy to it. Follow up with PCP.     Type 2 diabetes  Hemoglobin A1c 7.6% on 4/10/2024  -223, had one low BS of 80, 1 BS>200  Plan: Continue glimepiride 0.5 mg BID with meals. Hold if BS<100.  Check blood sugars 3 times daily. Continue carb controlled diet.      Depression  Insomnia  Plan: Continue escitalopram 10 mg daily, melatonin 5 mg at bedtime. Monitor mood and symptoms. Monitor sleep. Consider ACP referral as needed.      Slow transit constipation  Last BM yesterday, though reports constipation  Plan: Change miralax to 17 gram daily and daily PRN. Continue senna s 1 tab BID. Monitor bowels closely.     IDA   Plan: Monitor oxygen saturations PRN while sleeping. Follow up with sleep clinic outpatient.     Cognitive impairment  SLUMS 22/30, CPT 5/5.6 during TCU stay in 1/2024  SLUMS from this TCU stay 22/30 and CPT 5/5.6  Plan: Based on cognitive testing okay to live alone with weekly check-in to monitor for safety and check on problem solving.  Nursing to assist with cares, meals, medication assistance, activities.     Physical deconditioning  Secondary to recent hospitalization, acute/ chronic medical conditions as above  Patient continues to work with therapy.  Plan: Encourage participation in physical therapy/occupational  therapy for strengthening and deconditioning. Discharge planning per their recommendation. Social work to assist with d/c planning.      MED REC REQUIRED  Post Medication Reconciliation Status:  Medication reconciliation previously completed during another office visit    Disclaimer: This note may contain text created using speech-recognition software and may contain unintended word substitutions.     Electronically signed by: KATIE Chinchilla CNP

## 2024-07-23 NOTE — PATIENT INSTRUCTIONS
Orders  Meredith Allen  1940  1) Schedule oxycodone 5 mg at 4AM and continue oxycodone 2.5-5 mg q6h PRN. Do not administer PRN dose within 6 hours of scheduled dose Diagnosis: pain  2) Change miralax to 17 gram daily and daily PRN. Diagnosis: constipation  Hali Dinero, KATIE CNP on 7/23/2024 at 8:43 AM

## 2024-07-24 VITALS
TEMPERATURE: 98 F | OXYGEN SATURATION: 96 % | BODY MASS INDEX: 26.9 KG/M2 | DIASTOLIC BLOOD PRESSURE: 83 MMHG | RESPIRATION RATE: 18 BRPM | HEIGHT: 66 IN | WEIGHT: 167.4 LBS | SYSTOLIC BLOOD PRESSURE: 157 MMHG | HEART RATE: 71 BPM

## 2024-07-24 NOTE — PROGRESS NOTES
[facility] GERIATRICS DISCHARGE SUMMARY  PATIENT'S NAME: Meredith Allen  YOB: 1940  MEDICAL RECORD NUMBER:  [id number]  Place of Service where encounter took place:  [facility]  ([facility]) [[id number]]    PRIMARY CARE PROVIDER AND CLINIC RESPONSIBLE AFTER TRANSFER:   Lia Saini MD, [id number] [facility] / [facility] [id number]    Non-FMG Provider     Transferring providers: KATIE Horvath CNP; Melissa Elizabeth MD  Recent Hospitalization/ED:  [facility] stay 7/16/24 to 7/18/24.  Date of SNF Admission:  7/18/24  Date of SNF (anticipated) Discharge: July 26, 2024  Discharged to: home  Cognitive Scores: SLUMS 20/30, CPT 5/5.6  Physical Function: SBA transfers with 2WW, ambulating 250 feet with 2WW CGA, independent with eating, grooming/hygiene and upper body dressing, supervision with lower body dressing, toileting SBA, showering SBA    CODE STATUS/ADVANCE DIRECTIVES DISCUSSION:  Full Code   ALLERGIES: Metformin and Sulfa antibiotics    NURSING FACILITY COURSE   Medication Changes/Rationale:   -PTA metoprolol discontinued in the hospital    PMH significant for HTN, dyslipidemia, tachycardia, BPPV, type 2 diabetes, migraine, depression, chronic back pain, IDA     Summary of recent hospitalization:  Patient was hospitalized at [facility] from 7/16/2024 to 7/18/2024 for fall, acute left hip pain. Patient presented to the ED for evaluation of fall. This coincided with increased dose of gabapentin patient took at home. Lab evaluation in the ED significant for sodium 134, essentially normal CBC, negative COVID 19, influenza A/B, RSV, negative UA. Chest xray negative for acute findings. Head CT negative for acute intracranial pathology, small left parietal scalp edema/hematoma. CT cervical spine negative for acute fracture or subluxation of cervical spine. Xray of lumbar spine negative for acute findings. Xray of pelvis and bilateral hips negative for  acute fracture. Patient found to be orthostatic inpatient and received IV hydration and improved. Sodium 133 at last check on 7/18. PTA meloxicam and metoprolol discontinued inpatient. Discharged to TCU for physical rehabilitation and medical management.        Summary of nursing facility stay:   Today patient was seen for discharge evaluation in the TCU.  Patient reports significant pain at time of visit, she had filed an appeal and is unfortunately she did not when.  She does have a daughter who is able to stay with her for a period of time when she goes home.  We discussed follow-up with her primary care provider, if pain Persists, would recommend discussing SHWETHA.  She otherwise denies shortness of breath, chest pain, dysuria.  Reports bowels are moving well.  She will continue therapy through home care.      Specific problems addressed in the TCU:  Closed head injury  Acute left hip pain  Headache with history of migraine headache  Chronic low back pain  Fall, subsequent encounter  PTA meloxicam discontinued inpatient  Head CT, cervical, lumbar, pelvic and hip xrays negative for acute findings  Pain to left hip severe at times  Plan: Change oxycodone 2.5-5 mg q6h PRN. Continue tylenol 1000 mg TID, continue gabapentin 100 mg BID, lidocaine patch daily, voltaren gel to left hip TID. Continue therapy. If pain continues to persist consider IR.     Orthostatic hypotension  Essential hypertension  History of Tachycardia  Received IV hydration inpatient  PTA metoprolol discontinued inpatient  Continues to experience chronic lightheadedness/dizziness, no worse than baseline  -157- suspect pain is contributing to some of the higher BP  Plan: Not currently on medical management. Monitor symptoms. Encourage fluids. Follow up with PCP.     Hyponatremia, resolved  Mild  Sodium 138 on 7/23/2024  Plan: BMP PRN     Mild to moderate carotid bifurcation stenosis per CTA neck on 12/30/2023  Mild stenosis at left vertebral  artery origin per CTA neck on 12/30/2023  Dyslipidemia  Plan: Continue aspirin 81 mg daily. Not currently on statin- per care everywhere looks like she has an allergy to it. Follow up with PCP.     Type 2 diabetes  Hemoglobin A1c 7.6% on 4/10/2024  -281, had one low BS of 80, 2 BS>200  Plan: Continue glimepiride 0.5 mg BID with meals. Hold if BS<100.  Check blood sugars 3 times daily. Continue carb controlled diet.      Depression  Insomnia  Plan: Continue escitalopram 10 mg daily, melatonin 5 mg at bedtime. Monitor mood and symptoms. Monitor sleep. Follow up with PCP outpatient     Slow transit constipation  Last BM yesterday, bowels moving regularly  Plan: Continue miralax 17 gram daily and daily PRN. Continue senna s 1 tab BID. Monitor bowels closely.     IDA   Plan: Continue CPAP per home settings. Follow up with sleep clinic outpatient.     Cognitive impairment  SLUMS 22/30, CPT 5/5.6 during TCU stay in 1/2024  SLUMS from this TCU stay 22/30 and CPT 5/5.6  Plan: Based on cognitive testing okay to live alone with weekly check-in to monitor for safety and check on problem solving.  Nursing to assist with cares, meals, medication assistance, activities.     Physical deconditioning  Secondary to recent hospitalization, acute/ chronic medical conditions as above  Completed course of therapy in the TCU  Plan: Continue therapy through home care.      Discharge Medications:  MED REC REQUIRED  Post Medication Reconciliation Status:  Discharge medications reconciled and changed, see notes/orders     Current Outpatient Medications   Medication Sig Dispense Refill    acetaminophen (TYLENOL) 500 MG tablet Take 2 tablets (1,000 mg) by mouth 3 times daily      aspirin 81 MG EC tablet Take 81 mg by mouth daily      blood glucose (NO BRAND SPECIFIED) test strip Use to test blood sugar 2 times daily or as directed. To accompany: Blood Glucose Monitor Brands: per insurance. 100 strip 6    calcium-magnesium (CALMAG) 500-250  "MG TABS Take 1 tablet by mouth daily.      diclofenac (VOLTAREN) 1 % topical gel Apply 4 g topically 4 times daily      escitalopram (LEXAPRO) 10 MG tablet Take 10 mg by mouth daily      gabapentin (NEURONTIN) 100 MG capsule Take 1 capsule (100 mg) by mouth 2 times daily . If tolerating this dose, follow with your primary care to consider additional adjustments.      glimepiride (AMARYL) 1 MG tablet Take 0.5 mg by mouth 2 times daily (before meals) Hold if BS<100 or if not eating      Lidocaine (LIDOCARE) 4 % Patch Place 1 patch onto the skin daily as needed for moderate pain To prevent lidocaine toxicity, patient should be patch free for 12 hrs daily.      melatonin 5 MG tablet Take 5 mg by mouth at bedtime      Multiple Vitamin (MULTI-VITAMIN) per tablet Take 1 tablet by mouth daily.      oxyCODONE (ROXICODONE) 5 MG tablet Take 0.5-1 tablets (2.5-5 mg) by mouth every 6 hours as needed for severe pain      polyethylene glycol (MIRALAX) 17 GM/Dose powder Take 17 g by mouth daily And daily PRN      SENNA-docusate sodium (SENNA S) 8.6-50 MG tablet Take 1 tablet by mouth 2 times daily      thin (NO BRAND SPECIFIED) lancets Use with lanceting device. To accompany: Blood Glucose Monitor Brands: per insurance. 100 each 6    Vitamin D3 (CHOLECALCIFEROL) 25 mcg (1000 units) tablet Take 1,000 Units by mouth daily      blood glucose monitoring (NO BRAND SPECIFIED) meter device kit Use to test blood sugar 2 times daily or as directed. Preferred blood glucose meter OR supplies to accompany: Blood Glucose Monitor Brands: per insurance. 1 kit 0        Controlled medications:   Medication: oxycodone , 20-2.5 mg tabs given to patient at the time of discharge to take home     Past Medical History:   Past Medical History:   Diagnosis Date    Back pain     Hyperlipidemia     Migraines      Physical Exam:   Vitals: BP (!) 157/83   Pulse 71   Temp 98  F (36.7  C)   Resp 18   Ht 1.676 m (5' 6\")   Wt 75.9 kg (167 lb 6.4 oz)   SpO2 " 96%   BMI 27.02 kg/m    BMI: Body mass index is 27.02 kg/m .  GENERAL APPEARANCE:  Alert, in NAD  HEENT: normocephalic, moist mucous membranes, nose without drainage or crusting  RESP:  respiratory effort normal, no respiratory distress, Lung sounds clear, patient is on RA  CV: auscultation of heart done, rate and rhythm regular.  ABDOMEN: + bowel sounds, soft, nontender, no grimacing or guarding with palpation.  M/S:   no lower extremity edema  NEURO: cranial nerves 2-12 grossly intact and at patient's baseline; moves extremities freely  PSYCH: affect and mood normal      SNF labs: Labs done in SNF are in [facility]. Please refer to them using [employer]Care Everywhere. and Recent labs in [facility] reviewed by me today.     DISCHARGE PLAN:  Medical Follow Up:     Follow up with primary care provider in 1 week  Consider SHWETHA if pain does not improve    Discharge Services: Home Care:  Occupational Therapy, Physical Therapy, Registered Nurse, Home Health Aide, and From:  [facility]  Discharge Instructions:   Monitor blood glucose monitoring 3 times a day. Keep a record and bring it to your next primary provider visit.   Continue CPAP per home setttings    TOTAL DISCHARGE TIME:   Greater than 30 minutes  Electronically signed by:  KATIE Chinchilla CNP     Home care Face to Face documentation done in [facility] attached to Home care orders for [facility].

## 2024-07-24 NOTE — PATIENT INSTRUCTIONS
[facility] Discharge Orders    Name: Meredith Allen  : 1940  Planned Discharge Date: 2024  Discharged to: home    MEDICAL FOLLOW UP  Follow up with primary care provider in 1 week  Consider SHWETHA if pain does not improve    ORDER CHANGES:  PTA metoprolol discontinued in the hospital     DISCHARGE MEDICATIONS:  The patient s pharmacy is authorized to dispense a 30-day supply of medications. Refill requests should be directed to the primary provider, Lia Saini.   Medication: oxycodone, 20-2.5 mg tabs given to patient at the time of discharge to take home     Current Outpatient Medications   Medication Sig Dispense Refill    acetaminophen (TYLENOL) 500 MG tablet Take 2 tablets (1,000 mg) by mouth 3 times daily      aspirin 81 MG EC tablet Take 81 mg by mouth daily      blood glucose (NO BRAND SPECIFIED) test strip Use to test blood sugar 2 times daily or as directed. To accompany: Blood Glucose Monitor Brands: per insurance. 100 strip 6    calcium-magnesium (CALMAG) 500-250 MG TABS Take 1 tablet by mouth daily.      diclofenac (VOLTAREN) 1 % topical gel Apply 4 g topically 4 times daily      escitalopram (LEXAPRO) 10 MG tablet Take 10 mg by mouth daily      gabapentin (NEURONTIN) 100 MG capsule Take 1 capsule (100 mg) by mouth 2 times daily . If tolerating this dose, follow with your primary care to consider additional adjustments.      glimepiride (AMARYL) 1 MG tablet Take 0.5 mg by mouth 2 times daily (before meals) Hold if BS<100 or if not eating      Lidocaine (LIDOCARE) 4 % Patch Place 1 patch onto the skin daily as needed for moderate pain To prevent lidocaine toxicity, patient should be patch free for 12 hrs daily.      melatonin 5 MG tablet Take 5 mg by mouth at bedtime      Multiple Vitamin (MULTI-VITAMIN) per tablet Take 1 tablet by mouth daily.      oxyCODONE (ROXICODONE) 5 MG tablet Take 1 tablet (5 mg) by mouth daily. May also take 0.5-1 tablets (2.5-5 mg) every 6 hours  as needed for severe pain. 20 tablet 0    polyethylene glycol (MIRALAX) 17 GM/Dose powder Take 17 g by mouth daily And daily PRN      SENNA-docusate sodium (SENNA S) 8.6-50 MG tablet Take 1 tablet by mouth 2 times daily      thin (NO BRAND SPECIFIED) lancets Use with lanceting device. To accompany: Blood Glucose Monitor Brands: per insurance. 100 each 6    Vitamin D3 (CHOLECALCIFEROL) 25 mcg (1000 units) tablet Take 1,000 Units by mouth daily      blood glucose monitoring (NO BRAND SPECIFIED) meter device kit Use to test blood sugar 2 times daily or as directed. Preferred blood glucose meter OR supplies to accompany: Blood Glucose Monitor Brands: per insurance. 1 kit 0       SERVICES:  Home Care:  Occupational Therapy, Physical Therapy, Registered Nurse, Home Health Aide, and From:  [facility]   ADDITIONAL INSTRUCTIONS:  Monitor blood glucose monitoring 3 times a day. Keep a record and bring it to your next primary provider visit.   Continue CPAP per home KATIE Garvin CNP  This document was electronically signed on July 24, 2024

## 2024-07-25 ENCOUNTER — DISCHARGE SUMMARY NURSING HOME (OUTPATIENT)
Dept: GERIATRICS | Facility: CLINIC | Age: 84
End: 2024-07-25
Payer: COMMERCIAL

## 2024-07-25 DIAGNOSIS — Z87.898 HISTORY OF TACHYCARDIA: ICD-10-CM

## 2024-07-25 DIAGNOSIS — M54.50 CHRONIC LOW BACK PAIN, UNSPECIFIED BACK PAIN LATERALITY, UNSPECIFIED WHETHER SCIATICA PRESENT: ICD-10-CM

## 2024-07-25 DIAGNOSIS — G89.29 CHRONIC LOW BACK PAIN, UNSPECIFIED BACK PAIN LATERALITY, UNSPECIFIED WHETHER SCIATICA PRESENT: ICD-10-CM

## 2024-07-25 DIAGNOSIS — F33.9 DEPRESSION, RECURRENT (H): ICD-10-CM

## 2024-07-25 DIAGNOSIS — R53.81 PHYSICAL DECONDITIONING: ICD-10-CM

## 2024-07-25 DIAGNOSIS — I95.1 ORTHOSTATIC HYPOTENSION: ICD-10-CM

## 2024-07-25 DIAGNOSIS — G47.00 INSOMNIA, UNSPECIFIED TYPE: ICD-10-CM

## 2024-07-25 DIAGNOSIS — E78.5 DYSLIPIDEMIA: ICD-10-CM

## 2024-07-25 DIAGNOSIS — M54.42 ACUTE LEFT-SIDED LOW BACK PAIN WITH LEFT-SIDED SCIATICA: Primary | ICD-10-CM

## 2024-07-25 DIAGNOSIS — G47.33 OSA (OBSTRUCTIVE SLEEP APNEA): ICD-10-CM

## 2024-07-25 DIAGNOSIS — M25.552 ACUTE HIP PAIN, LEFT: ICD-10-CM
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
## 2024-07-25 DIAGNOSIS — I65.23 BILATERAL CAROTID ARTERY STENOSIS: ICD-10-CM

## 2024-07-25 DIAGNOSIS — K59.01 SLOW TRANSIT CONSTIPATION: ICD-10-CM

## 2024-07-25 DIAGNOSIS — E11.9 TYPE 2 DIABETES MELLITUS WITHOUT COMPLICATION, WITHOUT LONG-TERM CURRENT USE OF INSULIN (H): ICD-10-CM

## 2024-07-25 DIAGNOSIS — E87.1 HYPONATREMIA: ICD-10-CM

## 2024-07-25 DIAGNOSIS — R41.89 COGNITIVE IMPAIRMENT: ICD-10-CM

## 2024-07-25 DIAGNOSIS — I10 ESSENTIAL HYPERTENSION: ICD-10-CM

## 2024-07-25 DIAGNOSIS — S09.90XD CLOSED HEAD INJURY, SUBSEQUENT ENCOUNTER: ICD-10-CM

## 2024-07-25 DIAGNOSIS — Z86.69 HISTORY OF MIGRAINE: ICD-10-CM

## 2024-07-25 PROCEDURE — 99316 NF DSCHRG MGMT 30 MIN+: CPT | Performed by: NURSE PRACTITIONER

## 2024-07-25 RX ORDER — OXYCODONE HYDROCHLORIDE 5 MG/1
TABLET ORAL
Qty: 20 TABLET | Refills: 0 | Status: SHIPPED | OUTPATIENT
Start: 2024-07-25 | End: 2024-07-25

## 2024-07-25 RX ORDER — OXYCODONE HYDROCHLORIDE 5 MG/1
2.5-5 TABLET ORAL EVERY 6 HOURS PRN
Status: SHIPPED
Start: 2024-07-25

## 2024-07-25 NOTE — LETTER
7/25/2024      Meredith Allen  [address]  [place] [address]        [facility] GERIATRICS DISCHARGE SUMMARY  PATIENT'S NAME: Meredith Allen  YOB: 1940  MEDICAL RECORD NUMBER:  [id number]  Place of Service where encounter took place:  [facility]  ([facility]) [[id number]]    PRIMARY CARE PROVIDER AND CLINIC RESPONSIBLE AFTER TRANSFER:   Lia Saini MD, [address] / [place] [address]    Non-[facility] Provider     Transferring providers: KATIE Horvath CNP; Melissa Elizabeth MD  Recent Hospitalization/ED:  [facility] stay 7/16/24 to 7/18/24.  Date of SNF Admission:  7/18/24  Date of SNF (anticipated) Discharge: July 26, 2024  Discharged to: home  Cognitive Scores: SLUMS 20/30, CPT 5/5.6  Physical Function: SBA transfers with 2WW, ambulating 250 feet with 2WW CGA, independent with eating, grooming/hygiene and upper body dressing, supervision with lower body dressing, toileting SBA, showering SBA    CODE STATUS/ADVANCE DIRECTIVES DISCUSSION:  Full Code   ALLERGIES: Metformin and Sulfa antibiotics    NURSING FACILITY COURSE   Medication Changes/Rationale:   -PTA metoprolol discontinued in the hospital    PMH significant for HTN, dyslipidemia, tachycardia, BPPV, type 2 diabetes, migraine, depression, chronic back pain, IDA     Summary of recent hospitalization:  Patient was hospitalized at [facility] from 7/16/2024 to 7/18/2024 for fall, acute left hip pain. Patient presented to the ED for evaluation of fall. This coincided with increased dose of gabapentin patient took at home. Lab evaluation in the ED significant for sodium 134, essentially normal CBC, negative COVID 19, influenza A/B, RSV, negative UA. Chest xray negative for acute findings. Head CT negative for acute intracranial pathology, small left parietal scalp edema/hematoma. CT cervical spine negative for acute fracture or subluxation of cervical spine. Xray of  lumbar spine negative for acute findings. Xray of pelvis and bilateral hips negative for acute fracture. Patient found to be orthostatic inpatient and received IV hydration and improved. Sodium 133 at last check on 7/18. PTA meloxicam and metoprolol discontinued inpatient. Discharged to TCU for physical rehabilitation and medical management.        Summary of nursing facility stay:   Today patient was seen for discharge evaluation in the TCU.  Patient reports significant pain at time of visit, she had filed an appeal and is unfortunately she did not when.  She does have a daughter who is able to stay with her for a period of time when she goes home.  We discussed follow-up with her primary care provider, if pain Persists, would recommend discussing SHWETHA.  She otherwise denies shortness of breath, chest pain, dysuria.  Reports bowels are moving well.  She will continue therapy through home care.      Specific problems addressed in the TCU:  Closed head injury  Acute left hip pain  Headache with history of migraine headache  Chronic low back pain  Fall, subsequent encounter  PTA meloxicam discontinued inpatient  Head CT, cervical, lumbar, pelvic and hip xrays negative for acute findings  Pain to left hip severe at times  Plan: Change oxycodone 2.5-5 mg q6h PRN. Continue tylenol 1000 mg TID, continue gabapentin 100 mg BID, lidocaine patch daily, voltaren gel to left hip TID. Continue therapy. If pain continues to persist consider IR.     Orthostatic hypotension  Essential hypertension  History of Tachycardia  Received IV hydration inpatient  PTA metoprolol discontinued inpatient  Continues to experience chronic lightheadedness/dizziness, no worse than baseline  -157- suspect pain is contributing to some of the higher BP  Plan: Not currently on medical management. Monitor symptoms. Encourage fluids. Follow up with PCP.     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 Not currently on statin- per care everywhere looks like she has an allergy to it. Follow up with PCP.     Type 2 diabetes  Hemoglobin A1c 7.6% on 4/10/2024  -281, had one low BS of 80, 2 BS>200  Plan: Continue glimepiride 0.5 mg BID with meals. Hold if BS<100.  Check blood sugars 3 times daily. Continue carb controlled diet.      Depression  Insomnia  Plan: Continue escitalopram 10 mg daily, melatonin 5 mg at bedtime. Monitor mood and symptoms. Monitor sleep. Follow up with PCP outpatient     Slow transit constipation  Last BM yesterday, bowels moving regularly  Plan: Continue miralax 17 gram daily and daily PRN. Continue senna s 1 tab BID. Monitor bowels closely.     IDA   Plan: Continue CPAP per home settings. Follow up with sleep clinic outpatient.     Cognitive impairment  SLUMS 22/30, CPT 5/5.6 during TCU stay in 1/2024  SLUMS from this TCU stay 22/30 and CPT 5/5.6  Plan: Based on cognitive testing okay to live alone with weekly check-in to monitor for safety and check on problem solving.  Nursing to assist with cares, meals, medication assistance, activities.     Physical deconditioning  Secondary to recent hospitalization, acute/ chronic medical conditions as above  Completed course of therapy in the TCU  Plan: Continue therapy through home care.      Discharge Medications:  MED REC REQUIRED  Post Medication Reconciliation Status:  Discharge medications reconciled and changed, see notes/orders     Current Outpatient Medications   Medication Sig Dispense Refill     acetaminophen (TYLENOL) 500 MG tablet Take 2 tablets (1,000 mg) by mouth 3 times daily       aspirin 81 MG EC tablet Take 81 mg by mouth daily       blood glucose (NO BRAND SPECIFIED) test strip Use to test blood sugar 2 times daily or as directed. To accompany:  Blood Glucose Monitor Brands: per insurance. 100 strip 6     calcium-magnesium (CALMAG) 500-250 MG TABS Take 1 tablet by mouth daily.       diclofenac (VOLTAREN) 1 % topical gel Apply 4 g topically 4 times daily       escitalopram (LEXAPRO) 10 MG tablet Take 10 mg by mouth daily       gabapentin (NEURONTIN) 100 MG capsule Take 1 capsule (100 mg) by mouth 2 times daily . If tolerating this dose, follow with your primary care to consider additional adjustments.       glimepiride (AMARYL) 1 MG tablet Take 0.5 mg by mouth 2 times daily (before meals) Hold if BS<100 or if not eating       Lidocaine (LIDOCARE) 4 % Patch Place 1 patch onto the skin daily as needed for moderate pain To prevent lidocaine toxicity, patient should be patch free for 12 hrs daily.       melatonin 5 MG tablet Take 5 mg by mouth at bedtime       Multiple Vitamin (MULTI-VITAMIN) per tablet Take 1 tablet by mouth daily.       oxyCODONE (ROXICODONE) 5 MG tablet Take 0.5-1 tablets (2.5-5 mg) by mouth every 6 hours as needed for severe pain       polyethylene glycol (MIRALAX) 17 GM/Dose powder Take 17 g by mouth daily And daily PRN       SENNA-docusate sodium (SENNA S) 8.6-50 MG tablet Take 1 tablet by mouth 2 times daily       thin (NO BRAND SPECIFIED) lancets Use with lanceting device. To accompany: Blood Glucose Monitor Brands: per insurance. 100 each 6     Vitamin D3 (CHOLECALCIFEROL) 25 mcg (1000 units) tablet Take 1,000 Units by mouth daily       blood glucose monitoring (NO BRAND SPECIFIED) meter device kit Use to test blood sugar 2 times daily or as directed. Preferred blood glucose meter OR supplies to accompany: Blood Glucose Monitor Brands: per insurance. 1 kit 0        Controlled medications:   Medication: oxycodone , 20-2.5 mg tabs given to patient at the time of discharge to take home     Past Medical History:   Past Medical History:   Diagnosis Date     Back pain      Hyperlipidemia      Migraines      Physical Exam:   Vitals: BP (!)  "157/83   Pulse 71   Temp 98  F (36.7  C)   Resp 18   Ht 1.676 m (5' 6\")   Wt 75.9 kg (167 lb 6.4 oz)   SpO2 96%   BMI 27.02 kg/m    BMI: Body mass index is 27.02 kg/m .  GENERAL APPEARANCE:  Alert, in NAD  HEENT: normocephalic, moist mucous membranes, nose without drainage or crusting  RESP:  respiratory effort normal, no respiratory distress, Lung sounds clear, patient is on RA  CV: auscultation of heart done, rate and rhythm regular.  ABDOMEN: + bowel sounds, soft, nontender, no grimacing or guarding with palpation.  M/S:   no lower extremity edema  NEURO: cranial nerves 2-12 grossly intact and at patient's baseline; moves extremities freely  PSYCH: affect and mood normal      SNF labs: Labs done in SNF are in [facility]. Please refer to them using [employer] Everywhere. and Recent labs in [facility] reviewed by me today.     DISCHARGE PLAN:  Medical Follow Up:     Follow up with primary care provider in 1 week  Consider SHWETHA if pain does not improve    Discharge Services: Home Care:  Occupational Therapy, Physical Therapy, Registered Nurse, Home Health Aide, and From:  [facility]  Discharge Instructions:   Monitor blood glucose monitoring 3 times a day. Keep a record and bring it to your next primary provider visit.   Continue CPAP per home setttings    TOTAL DISCHARGE TIME:   Greater than 30 minutes  Electronically signed by:  KATIE Chinchilla CNP     Home care Face to Face documentation done in [facility] attached to Home care orders for [facility].               Sincerely,        KATIE Chinchilla CNP      "

## 2024-10-27 ENCOUNTER — APPOINTMENT (OUTPATIENT)
Dept: GENERAL RADIOLOGY | Facility: CLINIC | Age: 84
End: 2024-10-27
Attending: EMERGENCY MEDICINE
Payer: COMMERCIAL

## 2024-10-27 ENCOUNTER — APPOINTMENT (OUTPATIENT)
Dept: CT IMAGING | Facility: CLINIC | Age: 84
End: 2024-10-27
Attending: EMERGENCY MEDICINE
Payer: COMMERCIAL

## 2024-10-27 ENCOUNTER — HOSPITAL ENCOUNTER (EMERGENCY)
Facility: CLINIC | Age: 84
Discharge: HOME | End: 2024-10-27
Attending: EMERGENCY MEDICINE | Admitting: EMERGENCY MEDICINE
Payer: COMMERCIAL

## 2024-10-27 VITALS
BODY MASS INDEX: 29.16 KG/M2 | WEIGHT: 175 LBS | HEART RATE: 88 BPM | RESPIRATION RATE: 18 BRPM | TEMPERATURE: 98 F | OXYGEN SATURATION: 98 % | SYSTOLIC BLOOD PRESSURE: 172 MMHG | HEIGHT: 65 IN | DIASTOLIC BLOOD PRESSURE: 83 MMHG

## 2024-10-27 DIAGNOSIS — S16.1XXA CERVICAL STRAIN, INITIAL ENCOUNTER: ICD-10-CM

## 2024-10-27 DIAGNOSIS — S20.229A CONTUSION OF BACK, UNSPECIFIED LATERALITY, INITIAL ENCOUNTER: ICD-10-CM

## 2024-10-27 PROCEDURE — 72131 CT LUMBAR SPINE W/O DYE: CPT

## 2024-10-27 PROCEDURE — 72125 CT NECK SPINE W/O DYE: CPT

## 2024-10-27 PROCEDURE — 99284 EMERGENCY DEPT VISIT MOD MDM: CPT | Mod: 25

## 2024-10-27 PROCEDURE — 250N000013 HC RX MED GY IP 250 OP 250 PS 637: Performed by: EMERGENCY MEDICINE

## 2024-10-27 PROCEDURE — 72128 CT CHEST SPINE W/O DYE: CPT

## 2024-10-27 PROCEDURE — 73502 X-RAY EXAM HIP UNI 2-3 VIEWS: CPT

## 2024-10-27 RX ORDER — OXYCODONE HYDROCHLORIDE 5 MG/1
10 TABLET ORAL ONCE
Status: COMPLETED | OUTPATIENT
Start: 2024-10-27 | End: 2024-10-27

## 2024-10-27 RX ADMIN — OXYCODONE HYDROCHLORIDE 10 MG: 5 TABLET ORAL at 14:10

## 2024-10-27 ASSESSMENT — COLUMBIA-SUICIDE SEVERITY RATING SCALE - C-SSRS
1. IN THE PAST MONTH, HAVE YOU WISHED YOU WERE DEAD OR WISHED YOU COULD GO TO SLEEP AND NOT WAKE UP?: NO
6. HAVE YOU EVER DONE ANYTHING, STARTED TO DO ANYTHING, OR PREPARED TO DO ANYTHING TO END YOUR LIFE?: NO
2. HAVE YOU ACTUALLY HAD ANY THOUGHTS OF KILLING YOURSELF IN THE PAST MONTH?: NO

## 2024-10-27 ASSESSMENT — ACTIVITIES OF DAILY LIVING (ADL)
ADLS_ACUITY_SCORE: 0

## 2024-10-27 NOTE — ED PROVIDER NOTES
Emergency Department Note      History of Present Illness     Chief Complaint   Fall and Back Pain      HPI     Meredith Allen is a 84 year old female presents with fall and back pain.  Patient reports a history of chronic back pain with associated left leg pain that has been diagnosed with sciatica.  She has not improved with gabapentin and is no longer on this medication.  She was seen by physical therapy and [facility] orthopedics.  She will take oxycodone at night to assist with symptoms.  She has required use of a walker due to the discomfort and associated weakness.  Today she got up from a recliner and as she stood up she fell backwards.  She is uncertain if she struck her head but denies loss of consciousness or headache.  She since has suffered from back and neck pain.  She was unable to get up secondary to weakness.  Ultimately she was able to open the door for friends who called on 911 and prompting ED evaluation.    Independent Historian   None    Review of External Notes   None    Past Medical History     Medical History and Problem List   Past Medical History:   Diagnosis Date    Back pain     Hyperlipidemia     Migraines        Medications   acetaminophen (TYLENOL) 500 MG tablet  aspirin 81 MG EC tablet  blood glucose (NO BRAND SPECIFIED) test strip  blood glucose monitoring (NO BRAND SPECIFIED) meter device kit  calcium-magnesium (CALMAG) 500-250 MG TABS  diclofenac (VOLTAREN) 1 % topical gel  escitalopram (LEXAPRO) 10 MG tablet  gabapentin (NEURONTIN) 100 MG capsule  glimepiride (AMARYL) 1 MG tablet  Lidocaine (LIDOCARE) 4 % Patch  melatonin 5 MG tablet  Multiple Vitamin (MULTI-VITAMIN) per tablet  oxyCODONE (ROXICODONE) 5 MG tablet  polyethylene glycol (MIRALAX) 17 GM/Dose powder  SENNA-docusate sodium (SENNA S) 8.6-50 MG tablet  thin (NO BRAND SPECIFIED) lancets  Vitamin D3 (CHOLECALCIFEROL) 25 mcg (1000 units) tablet        Surgical History   Past Surgical History:   Procedure Laterality Date  "   CHOLECYSTECTOMY      COLONOSCOPY      CYSTECTOMY OVARIAN BENIGN      HYSTERECTOMY      HYSTERECTOMY TOTAL ABDOMINAL, REPAIR BLADDER, COMBINED      PHACOEMULSIFICATION CLEAR CORNEA WITH STANDARD INTRAOCULAR LENS IMPLANT  2/15/2012    Procedure:PHACOEMULSIFICATION CLEAR CORNEA WITH STANDARD INTRAOCULAR LENS IMPLANT; RIGHT PHACOEMULSIFICATION CLEAR CORNEA WITH STANDARD INTRAOCULAR LENS IMPLANT WITH BSS  ; Surgeon:BETSY COHEN; Location:[facility]       Physical Exam     Patient Vitals for the past 24 hrs:   BP Temp Temp src Pulse Resp SpO2 Height Weight   10/27/24 1551 (!) 172/83 -- -- -- -- 98 % -- --   10/27/24 1447 (!) 163/78 -- -- 88 -- 98 % -- --   10/27/24 1429 (!) 161/80 -- -- 84 -- 97 % -- --   10/27/24 1414 (!) 164/127 -- -- -- -- 96 % -- --   10/27/24 1359 (!) 178/93 -- -- 94 -- 96 % -- --   10/27/24 1344 (!) 149/81 -- -- 100 -- 97 % -- --   10/27/24 1329 (!) 163/81 -- -- 95 -- 97 % -- --   10/27/24 1328 (!) 163/81 98  F (36.7  C) Temporal 96 18 97 % 1.651 m (5' 5\") 79.4 kg (175 lb)     Physical Exam      HEENT:   No scalp hematoma or defect to the bony calvarium.      Dooley's and Racoon's sign negative.      Oropharynx is moist, without lesions or trismus.  EYES:   Conjunctiva normal, PERRL    EOMs intact  NECK:   C-spine is tender - cervical collar in place    No bony step-off to cervical spine.   CV:    Regular rate and rhythm.     No murmurs, rubs or gallops.    PULM:  Clear to auscultation bilateral.      No respiratory distress.      No subcutaneous emphysema or crepitus.  ABD:   Soft, non-tender, non-distended.      No rebound or guarding.  MSK:    Tenderness to the mid-lower thoracic and lumbar spine     No bony step-off    Mild tenderness to the pelvis on the left    No focal bony tenderness to the extremities  LYMPH:  No cervical lymphadenopathy.  NEURO:  Alert and oriented x 3. GCS 15.      CN II-XII intact, speech is clear with no aphasia.      Strength is 5/5 in all 4 extremities.  " Sensation is intact.      Normal muscular tone, no tremor.  SKIN:   Warm, dry and intact.    PSYCH:   Mood is good and affect is appropriate.  '    Diagnostics     Lab Results   Labs Ordered and Resulted from Time of ED Arrival to Time of ED Departure - No data to display    Imaging   XR Pelvis and Hip Left 1 View   Final Result   IMPRESSION: Bones are demineralized. There are mild degenerative changes of the left hip. No evidence of an acute fracture or dislocation.       CT Lumbar Spine w/o Contrast   Final Result   IMPRESSION:   CERVICAL SPINE CT:   1.  No fracture or posttraumatic subluxation.   2.  No high-grade spinal canal or neural foraminal stenosis.      THORACIC SPINE CT:   1.  No fracture or posttraumatic subluxation.   2.  No high-grade spinal canal or neural foraminal stenosis.      LUMBAR SPINE CT:   1.  No fracture or posttraumatic subluxation.   2.  No high-grade spinal canal or neural foraminal stenosis.      CT Thoracic Spine w/o Contrast   Final Result   IMPRESSION:   CERVICAL SPINE CT:   1.  No fracture or posttraumatic subluxation.   2.  No high-grade spinal canal or neural foraminal stenosis.      THORACIC SPINE CT:   1.  No fracture or posttraumatic subluxation.   2.  No high-grade spinal canal or neural foraminal stenosis.      LUMBAR SPINE CT:   1.  No fracture or posttraumatic subluxation.   2.  No high-grade spinal canal or neural foraminal stenosis.      CT Cervical Spine w/o Contrast   Final Result   IMPRESSION:   CERVICAL SPINE CT:   1.  No fracture or posttraumatic subluxation.   2.  No high-grade spinal canal or neural foraminal stenosis.      THORACIC SPINE CT:   1.  No fracture or posttraumatic subluxation.   2.  No high-grade spinal canal or neural foraminal stenosis.      LUMBAR SPINE CT:   1.  No fracture or posttraumatic subluxation.   2.  No high-grade spinal canal or neural foraminal stenosis.          Independent Interpretation   X-ray left hip shows no fracture    ED Course       Medications Administered   Medications   oxyCODONE (ROXICODONE) tablet 10 mg (10 mg Oral $Given 10/27/24 5909)       Procedures   Procedures     Discussion of Management   None    ED Course        Additional Documentation  None    Medical Decision Making / Diagnosis       CARMELINA Allen is a 84 year old female with a history of chronic back/left leg pain attributed to sciatica presents with fall and increased back and neck pain.  She has no features concerning for skull fracture or  intracerebral hemorrhage.  She was unable to be cleared by Nexus criteria.  Cervical spine CT is unremarkable.  She has no limited range of motion to draw concern for ligamentous injury.  CT scan of the thoracic, lumbar spine and x-rays of the pelvis/hip are unremarkable.  Patient's symptoms dramatically improved with intervention oxycodone.  She was able to ambulate after analgesics.  Evaluation is consistent with soft tissue contusion with underlying sciatica.  Patient safe for discharge home with ongoing management as an outpatient.    Disposition   The patient was discharged.     Diagnosis     ICD-10-CM    1. Cervical strain, initial encounter  S16.1XXA       2. Contusion of back, unspecified laterality, initial encounter  S20.229A            Discharge Medications   Discharge Medication List as of 10/27/2024  4:46 PM            MD Alan Zuleta Jeremiah R, MD  10/27/24 4506

## 2024-10-27 NOTE — ED TRIAGE NOTES
[facility]. Went to get up from recliner, grabbed walker, went to turn and lost her balance and fell backwards, landed on her back on carpeted floor, did hit her head on floor. Denies LOC, not on blood thinners. Pt was on the ground for approximately 20 minutes, had friends that were coming over at the time, crawled to answer the door, friends called EMS. HX: thoracic back pain and LLE pain. Pt c/o neck, back and leg pain. No obvious deformities noted. C-collar applied by EMS.      Triage Assessment (Adult)       Row Name 10/27/24 1328          Triage Assessment    Airway WDL WDL        Respiratory WDL    Respiratory WDL WDL        Skin Circulation/Temperature WDL    Skin Circulation/Temperature WDL WDL        Cardiac WDL    Cardiac WDL WDL        Peripheral/Neurovascular WDL    Peripheral Neurovascular WDL WDL        Cognitive/Neuro/Behavioral WDL    Cognitive/Neuro/Behavioral WDL WDL

## 2024-10-27 NOTE — ED NOTES
Bed: ED18  Expected date: 10/27/24  Expected time:   Means of arrival: Ambulance  Comments:  Cynthia Gutierrez

## (undated) RX ORDER — ONDANSETRON 2 MG/ML
INJECTION INTRAMUSCULAR; INTRAVENOUS
Status: DISPENSED
Start: 2024-07-16